# Patient Record
Sex: FEMALE | Race: WHITE | NOT HISPANIC OR LATINO | Employment: PART TIME | URBAN - METROPOLITAN AREA
[De-identification: names, ages, dates, MRNs, and addresses within clinical notes are randomized per-mention and may not be internally consistent; named-entity substitution may affect disease eponyms.]

---

## 2017-03-06 DIAGNOSIS — E78.5 HYPERLIPIDEMIA LDL GOAL <100: ICD-10-CM

## 2017-03-06 NOTE — TELEPHONE ENCOUNTER
simvastatin     Last Written Prescription Date: 5/10/16  Last Fill Quantity: 90, # refills: 3  Last Office Visit with [facility], [facility] or [facility] prescribing provider: 8/3/16       Lab Results   Component Value Date    CHOL 180 05/31/2016     Lab Results   Component Value Date    HDL 59 05/31/2016     Lab Results   Component Value Date    LDL 83 05/31/2016     Lab Results   Component Value Date    TRIG 191 05/31/2016     Lab Results   Component Value Date    CHOLHDLRATIO 2.9 05/20/2015       Labs showing if normal/abnormal  Lab Results   Component Value Date    CHOL 180 05/31/2016    TRIG 191 05/31/2016    HDL 59 05/31/2016    LDL 83 05/31/2016    VLDL 25 05/20/2015    CHOLHDLRATIO 2.9 05/20/2015

## 2017-03-07 ENCOUNTER — TRANSFERRED RECORDS (OUTPATIENT)
Dept: HEALTH INFORMATION MANAGEMENT | Facility: CLINIC | Age: 47
End: 2017-03-07

## 2017-03-07 LAB
ALT SERPL-CCNC: NORMAL U/L
AST SERPL-CCNC: 21 U/L (ref 5–34)
CREAT SERPL-MCNC: 0.76 MG/DL (ref 0.5–1.3)
GFR SERPL CREATININE-BSD FRML MDRD: 87.1 ML/MIN/1.73M2
HEP C HIM: NORMAL

## 2017-03-09 RX ORDER — SIMVASTATIN 40 MG
40 TABLET ORAL AT BEDTIME
Qty: 90 TABLET | Refills: 1 | Status: SHIPPED | OUTPATIENT
Start: 2017-03-09 | End: 2017-10-04

## 2017-03-29 ENCOUNTER — TRANSFERRED RECORDS (OUTPATIENT)
Dept: HEALTH INFORMATION MANAGEMENT | Facility: CLINIC | Age: 47
End: 2017-03-29

## 2017-03-29 LAB
HBA1C MFR BLD: 8.3 % (ref 4–6)
TSH SERPL-ACNC: 4.31 UIU/ML (ref 0.3–5)

## 2017-06-20 ENCOUNTER — TRANSFERRED RECORDS (OUTPATIENT)
Dept: HEALTH INFORMATION MANAGEMENT | Facility: CLINIC | Age: 47
End: 2017-06-20

## 2017-06-20 LAB
ALT SERPL-CCNC: 21 IU/L (ref 5–35)
AST SERPL-CCNC: 24 U/L (ref 5–34)
CREAT SERPL-MCNC: 0.96 MG/DL (ref 0.5–1.3)
GFR SERPL CREATININE-BSD FRML MDRD: 66.5 ML/MIN/1.73M2

## 2017-07-06 ENCOUNTER — TRANSFERRED RECORDS (OUTPATIENT)
Dept: HEALTH INFORMATION MANAGEMENT | Facility: CLINIC | Age: 47
End: 2017-07-06

## 2017-08-07 ENCOUNTER — TRANSFERRED RECORDS (OUTPATIENT)
Dept: HEALTH INFORMATION MANAGEMENT | Facility: CLINIC | Age: 47
End: 2017-08-07

## 2017-08-07 LAB
ALT SERPL-CCNC: 15 U/L (ref 6–29)
CHOLEST SERPL-MCNC: 149 MG/DL (ref 125–200)
CREAT SERPL-MCNC: 0.89 MG/DL (ref 0.5–1.1)
GFR SERPL CREATININE-BSD FRML MDRD: 78 ML/MIN/1.73M2
GLUCOSE SERPL-MCNC: 130 MG/DL (ref 65–99)
HBA1C MFR BLD: 8 % (ref 4–6)
HDLC SERPL-MCNC: 57 MG/DL
LDLC SERPL CALC-MCNC: 68 MG/DL
NONHDLC SERPL-MCNC: 92 MG/DL
POTASSIUM SERPL-SCNC: 4.5 MMOL/L (ref 3.5–5.3)
TRIGL SERPL-MCNC: 120 MG/DL

## 2017-08-13 ENCOUNTER — TRANSFERRED RECORDS (OUTPATIENT)
Dept: HEALTH INFORMATION MANAGEMENT | Facility: CLINIC | Age: 47
End: 2017-08-13

## 2017-09-20 ENCOUNTER — TRANSFERRED RECORDS (OUTPATIENT)
Dept: HEALTH INFORMATION MANAGEMENT | Facility: CLINIC | Age: 47
End: 2017-09-20

## 2017-09-20 LAB
ALT SERPL-CCNC: 36 IU/L (ref 5–35)
AST SERPL-CCNC: 30 U/L (ref 5–34)
CREAT SERPL-MCNC: 0.7 MG/DL (ref 0.5–1.3)
GFR SERPL CREATININE-BSD FRML MDRD: 95.7 ML/MIN/1.73M2

## 2017-10-04 DIAGNOSIS — E78.5 HYPERLIPIDEMIA LDL GOAL <100: ICD-10-CM

## 2017-10-04 NOTE — TELEPHONE ENCOUNTER
SIMVASTATIN     Last Written Prescription Date: 03/09/17  Last Fill Quantity: 90, # refills: 1  Last Office Visit with [facility] or [facility] prescribing provider: 08/03/16       Lab Results   Component Value Date    CHOL 149 08/07/2017     Lab Results   Component Value Date    HDL 57 08/07/2017     Lab Results   Component Value Date    LDL 68 08/07/2017     Lab Results   Component Value Date    TRIG 120 08/07/2017     Lab Results   Component Value Date    CHOLHDLRATIO 2.9 05/20/2015       Labs showing if normal/abnormal  Lab Results   Component Value Date    CHOL 149 08/07/2017    TRIG 120 08/07/2017    HDL 57 08/07/2017    LDL 68 08/07/2017    VLDL 25 05/20/2015    CHOLHDLRATIO 2.9 05/20/2015

## 2017-10-06 RX ORDER — SIMVASTATIN 40 MG
TABLET ORAL
Qty: 14 TABLET | Refills: 0 | Status: SHIPPED | OUTPATIENT
Start: 2017-10-06 | End: 2017-10-10

## 2017-10-06 NOTE — TELEPHONE ENCOUNTER
Pt last seen more than a yr ago , pl advise appt. I have reviewed her labs, needs to be seen once a yr for chronic meds.

## 2017-10-10 ENCOUNTER — OFFICE VISIT (OUTPATIENT)
Dept: INTERNAL MEDICINE | Facility: CLINIC | Age: 47
End: 2017-10-10
Payer: COMMERCIAL

## 2017-10-10 VITALS
DIASTOLIC BLOOD PRESSURE: 70 MMHG | TEMPERATURE: 97.4 F | WEIGHT: 125.8 LBS | OXYGEN SATURATION: 98 % | BODY MASS INDEX: 21.93 KG/M2 | HEART RATE: 101 BPM | SYSTOLIC BLOOD PRESSURE: 104 MMHG

## 2017-10-10 DIAGNOSIS — E78.5 HYPERLIPIDEMIA LDL GOAL <100: ICD-10-CM

## 2017-10-10 DIAGNOSIS — Z23 NEED FOR PROPHYLACTIC VACCINATION AND INOCULATION AGAINST INFLUENZA: ICD-10-CM

## 2017-10-10 DIAGNOSIS — Z23 NEED FOR PNEUMOCOCCAL VACCINATION: ICD-10-CM

## 2017-10-10 DIAGNOSIS — Z00.00 ENCOUNTER FOR ROUTINE ADULT HEALTH EXAMINATION WITHOUT ABNORMAL FINDINGS: Primary | ICD-10-CM

## 2017-10-10 PROCEDURE — 99396 PREV VISIT EST AGE 40-64: CPT | Mod: 25 | Performed by: INTERNAL MEDICINE

## 2017-10-10 PROCEDURE — 90662 IIV NO PRSV INCREASED AG IM: CPT | Performed by: INTERNAL MEDICINE

## 2017-10-10 PROCEDURE — 90670 PCV13 VACCINE IM: CPT | Performed by: INTERNAL MEDICINE

## 2017-10-10 PROCEDURE — 90472 IMMUNIZATION ADMIN EACH ADD: CPT | Performed by: INTERNAL MEDICINE

## 2017-10-10 PROCEDURE — 90471 IMMUNIZATION ADMIN: CPT | Performed by: INTERNAL MEDICINE

## 2017-10-10 RX ORDER — INSULIN GLARGINE 100 [IU]/ML
22-24 INJECTION, SOLUTION SUBCUTANEOUS EVERY MORNING
COMMUNITY
End: 2018-05-31

## 2017-10-10 RX ORDER — SIMVASTATIN 40 MG
TABLET ORAL
Qty: 90 TABLET | Refills: 3 | Status: SHIPPED | OUTPATIENT
Start: 2017-10-10 | End: 2018-10-21

## 2017-10-10 NOTE — NURSING NOTE
"Chief Complaint   Patient presents with     Physical     Not fasting       Initial /70  Pulse 101  Temp 97.4  F (36.3  C) (Oral)  Wt 125 lb 12.8 oz (57.1 kg)  SpO2 98%  BMI 21.93 kg/m2 Estimated body mass index is 21.93 kg/(m^2) as calculated from the following:    Height as of 8/3/16: 5' 3.5\" (1.613 m).    Weight as of this encounter: 125 lb 12.8 oz (57.1 kg).  Medication Reconciliation: complete     Sadia Varma CMA      "

## 2017-10-10 NOTE — PROGRESS NOTES
Injectable Influenza Immunization Documentation    1.  Is the person to be vaccinated sick today?   No    2. Does the person to be vaccinated have an allergy to a component   of the vaccine?   No    3. Has the person to be vaccinated ever had a serious reaction   to influenza vaccine in the past?   No    4. Has the person to be vaccinated ever had Guillain-Barré syndrome?   No    Form completed by FERNANDO Cunha LPN

## 2017-10-10 NOTE — PROGRESS NOTES
SUBJECTIVE:   CC: Perlita Chavez is an 46 year old woman who presents for preventive health visit.     Healthy Habits:    Do you get at least three servings of calcium containing foods daily (dairy, green leafy vegetables, etc.)? yes    Amount of exercise or daily activities, outside of work: 3 day(s) per week    Problems taking medications regularly No    Medication side effects: Weight Gain    Have you had an eye exam in the past two years? yes    Do you see a dentist twice per year? yes    Do you have sleep apnea, excessive snoring or daytime drowsiness?no      Pt sees endocrinologist  for diabetes and sees Rheumatologist for recent diagnosis of Rh arthritis.       Today's PHQ-2 Score: PHQ-2 ( 1999 Pfizer) 10/10/2017 5/10/2016   Q1: Little interest or pleasure in doing things 0 0   Q2: Feeling down, depressed or hopeless 0 0   PHQ-2 Score 0 0         Abuse: Current or Past(Physical, Sexual or Emotional)- No  Do you feel safe in your environment - Yes    Past Medical History:   Diagnosis Date     Background diabetic retinopathy(362.01)     sees retina doc q3 mo. Ophtalmo=Dr Solomon Davison ( [phone]) at [facility], Retina Specialist = Dr James Souza ( [phone])     Hyperlipidemia LDL goal < 130      NONSPECIFIC MEDICAL HISTORY     photosensitivity reaction on lisinopril 3/08     RA (rheumatoid arthritis) (H)     seeing rheumatologist.     Sjoegren syndrome (H)     seeing rheumatologist.     Type 1 diabetes, HbA1c goal < 8% (H) age 3    + retinopathy,seeing endocrinologist   since 08/14       Past Surgical History:   Procedure Laterality Date     C NONSPECIFIC PROCEDURE  8/00/97    T & A     C NONSPECIFIC PROCEDURE  5/00/99    L eye surgery     C NONSPECIFIC PROCEDURE  4/10/01    R cataract + IOL     C NONSPECIFIC PROCEDURE  2002    bilat vitrectomy     C NONSPECIFIC PROCEDURE  7/04    R eye vitrectomy     C NONSPECIFIC PROCEDURE  5/08    R vitrectomy & partial retinal detachment         Current Outpatient Prescriptions   Medication Sig Dispense Refill     BASAGLAR 100 UNIT/ML injection Inject 22-24 Units Subcutaneous every morning       simvastatin (ZOCOR) 40 MG tablet TAKE 1 TABLET BY MOUTH AT BEDTIME TO LOWER CHOLESTEROL. 14 tablet 0     InFLIXimab (REMICADE IV) Inject 300 mg into the vein Every 2 months       cycloSPORINE (RESTASIS) 0.05 % ophthalmic emulsion Place 1 drop into both eyes 2 times daily       Methotrexate Sodium (METHOTREXATE PO) Take by mouth once a week 7 tablets once a week       sulfaSALAzine (AZULFIDINE) 500 MG tablet Take 500 mg by mouth 2 times daily 2 tablets twice daily       DICLOFENAC PO Take by mouth 2 times daily       FOLIC ACID PO Take 1 mg by mouth daily       blood glucose (EDWIN CONTOUR) test strip 1 strip by In Vitro route 4 times daily (before meals and nightly) Checks 4-5 x daily 400 strip 3     NOVOLOG FLEXPEN SOLN 100 UNIT/ML Inject  Subcutaneous 3 times daily (before meals). 6  units in AM and  6-8 units for lunch / dinner.   Max dose of 30 units per day. 3 Month 1     Omega-3 Fatty Acids (FISH OIL TRIPLE STRENGTH) 1400 MG CAPS Take 2 tablets by mouth daily.       ValACYclovir (VALTREX) 500 MG tablet Take 1 tablet by mouth daily. 14 tablet prn     Insulin Pen Needle (B-D U/F PEN NEEDLE) needle 3 Box See Admin Instructions. Use 5 x daily or as directed. 1 Box prn     TIMOLOL MALEATE OP Apply  to eye daily. 1 drop in each eye.        ACE/ARB NOT PRESCRIBED, INTENTIONAL, 1 each by Other route continuous prn.       MALKA 28 3-0.03 MG OR TABS 1 TABLET DAILY 3 months prn     ASPIRIN 81 MG OR TABS ONE DAILY 100 3     ONE DAILY MULTIPLE VITAMIN OR TABS one daily  0     prednisoLONE acetate (PRED FORTE) 1 % ophthalmic suspension Place 1 drop into both eyes daily         Family History   Problem Relation Age of Onset     Arthritis Mother      on celebrex,alive & healthy 2002     Arthritis Father      on celebrex(2002)     HEART DISEASE Father      heart attack  1990, 60 years old (2002)     DIABETES Father      CEREBROVASCULAR DISEASE Father      age 70     Thyroid Disease Sister      on thyroid med for couple of years , 35 yrs now(2002)     CANCER Paternal Grandmother      STOMACH     CEREBROVASCULAR DISEASE Paternal Grandfather      age 90         Social History   Substance Use Topics     Smoking status: Never Smoker     Smokeless tobacco: Never Used     Alcohol use Yes      Comment: 1 drink every 3 weeks     The patient does not drink >3 drinks per day nor >7 drinks per week.    Reviewed orders with patient.  Reviewed health maintenance and updated orders accordingly - Yes    Pertinent mammograms are reviewed under the imaging tab.  History of abnormal Pap smear: NO - age 30- 65 PAP every 3 years recommended    Reviewed and updated as needed this visit by clinical staff  Tobacco  Allergies  Meds  Med Hx  Surg Hx  Fam Hx  Soc Hx        Reviewed and updated as needed this visit by Provider          ROS:  C: NEGATIVE for fever, chills, change in weight  I: NEGATIVE for worrisome rashes, moles or lesions  E: NEGATIVE for vision changes or irritation  ENT: NEGATIVE for ear, mouth and throat problems  R: NEGATIVE for significant cough or SOB  B: NEGATIVE for masses, tenderness or discharge  CV: NEGATIVE for chest pain, palpitations or peripheral edema  GI: NEGATIVE for nausea, abdominal pain, heartburn, or change in bowel habits  : NEGATIVE for unusual urinary or vaginal symptoms. Periods are regular.  M: NEGATIVE for significant arthralgias or myalgia  N: NEGATIVE for weakness, dizziness or paresthesias  P: NEGATIVE for changes in mood or affect    OBJECTIVE:   /70  Pulse 101  Temp 97.4  F (36.3  C) (Oral)  Wt 125 lb 12.8 oz (57.1 kg)  SpO2 98%  BMI 21.93 kg/m2  EXAM:  GENERAL APPEARANCE: healthy, alert and no distress  EYES: Eyes grossly normal to inspection, PERRL and conjunctivae and sclerae normal  HENT: ear canals and TM's normal, nose and mouth  "without ulcers or lesions, oropharynx clear and oral mucous membranes moist  NECK: no adenopathy, no asymmetry, masses, or scars and thyroid normal to palpation  RESP: lungs clear to auscultation - no rales, rhonchi or wheezes  BREAST: normal without masses, tenderness or nipple discharge and no palpable axillary masses or adenopathy  CV: regular rate and rhythm, normal S1 S2, no S3 or S4, no murmur, click or rub, no peripheral edema and peripheral pulses strong  ABDOMEN: soft, nontender, no hepatosplenomegaly, no masses and bowel sounds normal  MS: no musculoskeletal defects are noted and gait is age appropriate without ataxia  NEURO: Normal strength and tone, sensory exam grossly normal, mentation intact and speech normal  PSYCH: mentation appears normal and affect normal/bright    ASSESSMENT/PLAN:     (Z00.00) Encounter for routine adult health examination without abnormal findings  (primary encounter diagnosis)  Plan: has had recent BMP,Liver tests, Lipid panel and A1c. ,not due for pap.      (E78.5) Hyperlipidemia LDL goal <100  Comment: recent lipids reviewed, normal  Plan: simvastatin (ZOCOR) 40 MG tablet refilled.explained clearly about the medication,insructions and side effects.            (Z23) Need for prophylactic vaccination and inoculation against influenza  Plan: HC FLU VACCINE, INCREASED ANTIGEN, PRESV FREE,          ADMIN Vaccine, Initial  (51679),         (Z23) Need for pneumococcal vaccination  Plan: Pneumococcal vaccine 13 valent PCV13 IM         (Prevnar) [31806]            COUNSELING:   Reviewed preventive health counseling, as reflected in patient instructions       Regular exercise       Healthy diet/nutrition       Immunizations    Vaccinated for: Influenza and Pneumococcal               reports that she has never smoked. She has never used smokeless tobacco.    Estimated body mass index is 21.93 kg/(m^2) as calculated from the following:    Height as of 8/3/16: 5' 3.5\" (1.613 m).    Weight " as of this encounter: 125 lb 12.8 oz (57.1 kg).         Counseling Resources:  ATP IV Guidelines  Pooled Cohorts Equation Calculator  Breast Cancer Risk Calculator  FRAX Risk Assessment  ICSI Preventive Guidelines  Dietary Guidelines for Americans, 2010  USDA's MyPlate  ASA Prophylaxis  Lung CA Screening    Calos Melendez MD  [facility]

## 2017-10-10 NOTE — MR AVS SNAPSHOT
After Visit Summary   10/10/2017    Perlita Chavez    MRN: [id number]           Patient Information     Date Of Birth          1970        Visit Information        Provider Department      10/10/2017 3:00 PM Calos Melendez MD [facility]        Today's Diagnoses     Encounter for routine adult health examination without abnormal findings    -  1    Hyperlipidemia LDL goal <100        Need for prophylactic vaccination and inoculation against influenza        Need for pneumococcal vaccination          Care Instructions      Preventive Health Recommendations  Female Ages 40 to 49    Yearly exam:     See your health care provider every year in order to  1. Review health changes.   2. Discuss preventive care.    3. Review your medicines if your doctor prescribed any.      Get a Pap test every three years (unless you have an abnormal result and your provider advises testing more often).      If you get Pap tests with HPV test, you only need to test every 5 years, unless you have an abnormal result. You do not need a Pap test if your uterus was removed (hysterectomy) and you have not had cancer.      You should be tested each year for STDs (sexually transmitted diseases), if you're at risk.       Ask your doctor if you should have a mammogram.      Have a colonoscopy (test for colon cancer) if someone in your family has had colon cancer or polyps before age 50.       Have a cholesterol test every 5 years.       Have a diabetes test (fasting glucose) after age 45. If you are at risk for diabetes, you should have this test every 3 years.    Shots: Get a flu shot each year. Get a tetanus shot every 10 years.     Nutrition:     Eat at least 5 servings of fruits and vegetables each day.    Eat whole-grain bread, whole-wheat pasta and brown rice instead of white grains and rice.    Talk to your provider about Calcium and Vitamin D.     Lifestyle    Exercise at least 150 minutes a  "week (an average of 30 minutes a day, 5 days a week). This will help you control your weight and prevent disease.    Limit alcohol to one drink per day.    No smoking.     Wear sunscreen to prevent skin cancer.    See your dentist every six months for an exam and cleaning.          Follow-ups after your visit        Who to contact     If you have questions or need follow up information about today's clinic visit or your schedule please contact [facility] directly at [phone].  Normal or non-critical lab and imaging results will be communicated to you by MyChart, letter or phone within 4 business days after the clinic has received the results. If you do not hear from us within 7 days, please contact the clinic through [facility] or phone. If you have a critical or abnormal lab result, we will notify you by phone as soon as possible.  Submit refill requests through [employer] or call your pharmacy and they will forward the refill request to us. Please allow 3 business days for your refill to be completed.          Additional Information About Your Visit        [facility] Information     [employer] lets you send messages to your doctor, view your test results, renew your prescriptions, schedule appointments and more. To sign up, go to [email or web address] . Click on \"Log in\" on the left side of the screen, which will take you to the Welcome page. Then click on \"Sign up Now\" on the right side of the page.     You will be asked to enter the access code listed below, as well as some personal information. Please follow the directions to create your username and password.     Your access code is: [email or web address]  Expires: 2018  8:20 AM     Your access code will  in 90 days. If you need help or a new code, please call your [facility] or [phone].        Care EveryWhere ID     This is your Care EveryWhere ID. This could be used by other organizations to access your [place] medical " records  [id number]        Your Vitals Were     Pulse Temperature Pulse Oximetry BMI (Body Mass Index)          101 97.4  F (36.3  C) (Oral) 98% 21.93 kg/m2         Blood Pressure from Last 3 Encounters:   10/10/17 104/70   08/03/16 136/78   05/10/16 120/70    Weight from Last 3 Encounters:   10/10/17 125 lb 12.8 oz (57.1 kg)   08/03/16 122 lb (55.3 kg)   05/10/16 118 lb (53.5 kg)              We Performed the Following     ADMIN Vaccine, Initial (47348)     HC FLU VACCINE, INCREASED ANTIGEN, PRESV FREE     Pneumococcal vaccine 13 valent PCV13 IM (Prevnar) [49082]          Today's Medication Changes          These changes are accurate as of: 10/10/17 11:59 PM.  If you have any questions, ask your nurse or doctor.               These medicines have changed or have updated prescriptions.        Dose/Directions    BASAGLAR 100 UNIT/ML injection   This may have changed:  Another medication with the same name was removed. Continue taking this medication, and follow the directions you see here.   Changed by:  Calos Melendez MD        Dose:  22-24 Units   Inject 22-24 Units Subcutaneous every morning   Refills:  0       simvastatin 40 MG tablet   Commonly known as:  ZOCOR   This may have changed:  See the new instructions.   Used for:  Hyperlipidemia LDL goal <100   Changed by:  Calos Melendez MD        TAKE 1 TABLET BY MOUTH AT BEDTIME TO LOWER CHOLESTEROL.   Quantity:  90 tablet   Refills:  3         Stop taking these medicines if you haven't already. Please contact your care team if you have questions.     cephALEXin 500 MG capsule   Commonly known as:  KEFLEX   Stopped by:  Calos Melendez MD                Where to get your medicines      These medications were sent to [facility] [id number]  [place], [place] - [address] AT [facility] [address]  [address] [place] [address] [address]    Hours:  24-hours Phone:  [phone]     simvastatin 40 MG tablet                 Primary Care Provider Office Phone # Fax #    Charbelnakumari JUSTINE MD Nora [phone]       [address] [facility] [address]        Equal Access to Services     SMITA ARAYA : Hadii aad ku haddeepikao Soomaali, waaxda luqadaha, qaybta kaalmada adeegyada, peewee barcenasarnulfo dunn. So [facility] [phone].    ATENCIÓN: Si habla español, tiene a perkins disposición servicios gratuitos de asistencia lingüística. Llame al [phone].    We comply with applicable federal civil rights laws and [place] laws. We do not discriminate on the basis of race, color, national origin, age, disability, sex, sexual orientation, or gender identity.            Thank you!     Thank you for choosing [facility]  for your care. Our goal is always to provide you with excellent care. Hearing back from our patients is one way we can continue to improve our services. Please take a few minutes to complete the written survey that you may receive in the mail after your visit with us. Thank you!             Your Updated Medication List - Protect others around you: Learn how to safely use, store and throw away your medicines at www.disposemymeds.org.          This list is accurate as of: 10/10/17 11:59 PM.  Always use your most recent med list.                   Brand Name Dispense Instructions for use Diagnosis    ACE/ARB NOT PRESCRIBED (INTENTIONAL)      1 each by Other route continuous prn.    Type 1 diabetes, HbA1c goal < 8% (H), Hyperlipidemia LDL goal < 130       aspirin 81 MG tablet     100    ONE DAILY        BASAGLAR 100 UNIT/ML injection      Inject 22-24 Units Subcutaneous every morning        blood glucose monitoring test strip    EDWIN CONTOUR    400 strip    1 strip by In Vitro route 4 times daily (before meals and nightly) Checks 4-5 x daily    Type 1 diabetes, HbA1c goal < 8% (H)       cycloSPORINE 0.05 % ophthalmic emulsion    RESTASIS     Place 1 drop into both eyes 2  times daily        DICLOFENAC PO      Take by mouth 2 times daily        FISH OIL TRIPLE STRENGTH 1400 MG Caps      Take 2 tablets by mouth daily.        FOLIC ACID PO      Take 1 mg by mouth daily        insulin pen needle 31G X 5 MM    B-D U/F    1 Box    3 Box See Admin Instructions. Use 5 x daily or as directed.    Type 1 diabetes, HbA1c goal < 8% (H)       METHOTREXATE PO      Take by mouth once a week 7 tablets once a week        NovoLOG FLEXpen 100 UNITS/ML injection   Generic drug:  insulin aspart     3 Month    Inject  Subcutaneous 3 times daily (before meals). 6  units in AM and  6-8 units for lunch / dinner.   Max dose of 30 units per day.    Type 1 diabetes, HbA1c goal < 8% (H)       ONE DAILY MULTIPLE VITAMIN Tabs      one daily        prednisoLONE acetate 1 % ophthalmic susp    PRED FORTE     Place 1 drop into both eyes daily        REMICADE IV      Inject 300 mg into the vein Every 2 months        simvastatin 40 MG tablet    ZOCOR    90 tablet    TAKE 1 TABLET BY MOUTH AT BEDTIME TO LOWER CHOLESTEROL.    Hyperlipidemia LDL goal <100       sulfaSALAzine 500 MG tablet    AZULFIDINE     Take 500 mg by mouth 2 times daily 2 tablets twice daily        TIMOLOL MALEATE OP      Apply  to eye daily. 1 drop in each eye.        VALTREX 500 MG tablet   Generic drug:  valACYclovir     14 tablet    Take 1 tablet by mouth daily.    Mixed hyperlipidemia, Routine physical examination       MALKA 28 3-0.03 MG per tablet   Generic drug:  drospirenone-ethinyl estradiol     3 months     1 TABLET DAILY    Type I (juvenile type) diabetes mellitus without mention of complication, not stated as uncontrolled, Mixed hyperlipidemia

## 2017-10-13 ENCOUNTER — TRANSFERRED RECORDS (OUTPATIENT)
Dept: HEALTH INFORMATION MANAGEMENT | Facility: CLINIC | Age: 47
End: 2017-10-13

## 2017-10-18 ENCOUNTER — TRANSFERRED RECORDS (OUTPATIENT)
Dept: HEALTH INFORMATION MANAGEMENT | Facility: CLINIC | Age: 47
End: 2017-10-18

## 2017-11-07 ENCOUNTER — TRANSFERRED RECORDS (OUTPATIENT)
Dept: HEALTH INFORMATION MANAGEMENT | Facility: CLINIC | Age: 47
End: 2017-11-07

## 2017-11-07 LAB
ALT SERPL-CCNC: 18 IU/L (ref 5–35)
AST SERPL-CCNC: 19 U/L (ref 5–34)

## 2017-11-08 ENCOUNTER — TRANSFERRED RECORDS (OUTPATIENT)
Dept: HEALTH INFORMATION MANAGEMENT | Facility: CLINIC | Age: 47
End: 2017-11-08

## 2017-11-08 LAB
ALT SERPL-CCNC: 15 U/L (ref 6–29)
CREAT SERPL-MCNC: 0.83 MG/DL (ref 0.5–1.1)
GFR SERPL CREATININE-BSD FRML MDRD: 85 ML/MIN/1.73M2
GLUCOSE SERPL-MCNC: 370 MG/DL (ref 65–99)
HBA1C MFR BLD: 8.2 % (ref 4–6)
POTASSIUM SERPL-SCNC: 3.7 MMOL/L (ref 3.5–5.3)

## 2017-11-12 ENCOUNTER — TRANSFERRED RECORDS (OUTPATIENT)
Dept: HEALTH INFORMATION MANAGEMENT | Facility: CLINIC | Age: 47
End: 2017-11-12

## 2018-01-24 ENCOUNTER — TRANSFERRED RECORDS (OUTPATIENT)
Dept: HEALTH INFORMATION MANAGEMENT | Facility: CLINIC | Age: 48
End: 2018-01-24

## 2018-01-24 LAB
ALT SERPL-CCNC: 16 IU/L (ref 5–35)
AST SERPL-CCNC: 20 U/L (ref 5–34)
CREAT SERPL-MCNC: 0.84 MG/DL (ref 0.5–1.3)
GFR SERPL CREATININE-BSD FRML MDRD: 77.2 ML/MIN/1.73M2

## 2018-02-02 ENCOUNTER — TRANSFERRED RECORDS (OUTPATIENT)
Dept: HEALTH INFORMATION MANAGEMENT | Facility: CLINIC | Age: 48
End: 2018-02-02

## 2018-03-16 ENCOUNTER — OFFICE VISIT (OUTPATIENT)
Dept: ENDOCRINOLOGY | Facility: CLINIC | Age: 48
End: 2018-03-16
Payer: COMMERCIAL

## 2018-03-16 VITALS
BODY MASS INDEX: 21.51 KG/M2 | HEART RATE: 85 BPM | DIASTOLIC BLOOD PRESSURE: 81 MMHG | HEIGHT: 64 IN | WEIGHT: 126 LBS | SYSTOLIC BLOOD PRESSURE: 136 MMHG

## 2018-03-16 DIAGNOSIS — E10.3599 TYPE 1 DIABETES MELLITUS WITH PROLIFERATIVE RETINOPATHY, UNSPECIFIED LATERALITY, UNSPECIFIED PROLIFERATIVE RETINOPATHY TYPE: Primary | ICD-10-CM

## 2018-03-16 LAB — HBA1C MFR BLD: 8.4 % (ref 4.3–6)

## 2018-03-16 PROCEDURE — 36415 COLL VENOUS BLD VENIPUNCTURE: CPT | Performed by: INTERNAL MEDICINE

## 2018-03-16 PROCEDURE — 99214 OFFICE O/P EST MOD 30 MIN: CPT | Performed by: INTERNAL MEDICINE

## 2018-03-16 PROCEDURE — 80048 BASIC METABOLIC PNL TOTAL CA: CPT | Performed by: INTERNAL MEDICINE

## 2018-03-16 PROCEDURE — 84460 ALANINE AMINO (ALT) (SGPT): CPT | Performed by: INTERNAL MEDICINE

## 2018-03-16 PROCEDURE — 82043 UR ALBUMIN QUANTITATIVE: CPT | Performed by: INTERNAL MEDICINE

## 2018-03-16 PROCEDURE — 84443 ASSAY THYROID STIM HORMONE: CPT | Performed by: INTERNAL MEDICINE

## 2018-03-16 PROCEDURE — 83036 HEMOGLOBIN GLYCOSYLATED A1C: CPT | Performed by: INTERNAL MEDICINE

## 2018-03-16 RX ORDER — DICLOFENAC SODIUM 75 MG/1
TABLET, DELAYED RELEASE ORAL
Refills: 2 | COMMUNITY
Start: 2018-03-03

## 2018-03-16 RX ORDER — TIMOLOL MALEATE 5 MG/ML
SOLUTION/ DROPS OPHTHALMIC
Refills: 6 | COMMUNITY
Start: 2017-10-30 | End: 2019-09-17

## 2018-03-16 RX ORDER — PREDNISONE 5 MG/1
2.5 TABLET ORAL
Refills: 0 | COMMUNITY
Start: 2018-03-08

## 2018-03-16 NOTE — NURSING NOTE
"Chief Complaint   Patient presents with     Diabetes       Initial /81 (BP Location: Right arm, Cuff Size: Adult Regular)  Pulse 85  Ht 5' 3.5\" (1.613 m)  Wt 126 lb (57.2 kg)  BMI 21.97 kg/m2 Estimated body mass index is 21.97 kg/(m^2) as calculated from the following:    Height as of this encounter: 5' 3.5\" (1.613 m).    Weight as of this encounter: 126 lb (57.2 kg).  Medication Reconciliation: complete         Awilda Quintero      "

## 2018-03-16 NOTE — PATIENT INSTRUCTIONS
Continue current insulin management plan  Use the bolus wizard advisor with the Accucheck Guide meter and iPhone program  Will switch from Novolog to Fiasp with same dosing, new eRx will be sent to pharmacy   Activate the Fiasp discount cared   Also needs update on the Basaglar pen insulin and Accucheck Guide TS's, pen needles   Uses  pharmacy on [address]  Sign up for [facility]

## 2018-03-16 NOTE — MR AVS SNAPSHOT
"              After Visit Summary   3/16/2018    Perlita Chavez    MRN: [id number]           Patient Information     Date Of Birth          1970        Visit Information        Provider Department      3/16/2018 3:30 PM Saurabh Lowe MD [facility]        Today's Diagnoses     Type 1 diabetes mellitus with proliferative retinopathy, unspecified laterality, unspecified proliferative retinopathy type (H)    -  1      Care Instructions    Continue current insulin management plan  Use the bolus wizard advisor with the Accucheck Guide meter and iPhone program  Will switch from Novolog to Fiasp with same dosing, new eRx will be sent to pharmacy   Activate the Fiasp discount cared   Also needs update on the Basaglar pen insulin and Accucheck Guide TS's, pen needles   Uses  pharmacy on [address]  Sign [facility] for [facility]          Follow-ups after your visit        Who to contact     If you have questions or need follow up information about today's clinic visit or your schedule please contact [facility] directly at [phone].  Normal or non-critical lab and imaging results will be communicated to you by MyChart, letter or phone within 4 business days after the clinic has received the results. If you do not hear from us within 7 days, please contact the clinic through [facility] [employer] or phone. If you have a critical or abnormal lab result, we will notify you by phone as soon as possible.  Submit refill requests through [employer] or call your pharmacy and they will forward the refill request to us. Please allow 3 business days for your refill to be completed.          Additional Information About Your Visit        MyChart Information     [employer] lets you send messages to your doctor, view your test results, renew your prescriptions, schedule appointments and more. To sign up, go to [email or web address] . Click on \"Log in\" on the left side of the screen, which will take " "you to the Welcome page. Then click on \"Sign up Now\" on the right side of the page.     You will be asked to enter the access code listed below, as well as some personal information. Please follow the directions to create your username and password.     Your access code is: [email or web address]  Expires: 2018  4:18 PM     Your access code will  in 90 days. If you need help or a new code, please call your [place] clinic or [phone].        Care EveryWhere ID     This is your Care EveryWhere ID. This could be used by other organizations to access your [place] medical records  [id number]        Your Vitals Were     Pulse Height BMI (Body Mass Index)             85 5' 3.5\" (1.613 m) 21.97 kg/m2          Blood Pressure from Last 3 Encounters:   18 136/81   10/10/17 104/70   16 136/78    Weight from Last 3 Encounters:   18 126 lb (57.2 kg)   10/10/17 125 lb 12.8 oz (57.1 kg)   16 122 lb (55.3 kg)              We Performed the Following     Albumin Random Urine Quantitative with Creat Ratio     ALT     Basic metabolic panel     Hemoglobin A1c     TSH        Primary Care Provider Office Phone # Fax #    Gabrielai JUSTINE Melendez [phone]       [address] [facility] [address]        Equal Access to Services     [facility]: Hadii aad ku hadasho Soomaali, waaxda luqadaha, qaybta kaalmada adeegyada, peewee garner . So [facility] [phone].    ATENCIÓN: Si habla español, tiene a perkins disposición servicios gratuitos de asistencia lingüística. Alina al [phone].    We comply with applicable federal civil rights laws and [place] laws. We do not discriminate on the basis of race, color, national origin, age, disability, sex, sexual orientation, or gender identity.            Thank you!     Thank you for choosing [facility]  for your care. Our goal is always to provide you with excellent care. Hearing back from our patients is " one way we can continue to improve our services. Please take a few minutes to complete the written survey that you may receive in the mail after your visit with us. Thank you!             Your Updated Medication List - Protect others around you: Learn how to safely use, store and throw away your medicines at www.disposemymeds.org.          This list is accurate as of 3/16/18  4:18 PM.  Always use your most recent med list.                   Brand Name Dispense Instructions for use Diagnosis    ACE/ARB/ARNI NOT PRESCRIBED (INTENTIONAL)      1 each by Other route continuous prn.    Type 1 diabetes, HbA1c goal < 8% (H), Hyperlipidemia LDL goal < 130       aspirin 81 MG tablet     100    ONE DAILY        BASAGLAR 100 UNIT/ML injection      Inject 22-24 Units Subcutaneous every morning        blood glucose monitoring test strip    EDWIN CONTOUR    400 strip    1 strip by In Vitro route 4 times daily (before meals and nightly) Checks 4-5 x daily    Type 1 diabetes, HbA1c goal < 8% (H)       cycloSPORINE 0.05 % ophthalmic emulsion    RESTASIS     Place 1 drop into both eyes 2 times daily        diclofenac 75 MG EC tablet    VOLTAREN     TK 1 T PO BID        DICLOFENAC PO      Take by mouth 2 times daily        FISH OIL TRIPLE STRENGTH 1400 MG Caps      Take 2 tablets by mouth daily.        FOLIC ACID PO      Take 1 mg by mouth daily        insulin pen needle 31G X 5 MM    B-D U/F    1 Box    3 Box See Admin Instructions. Use 5 x daily or as directed.    Type 1 diabetes, HbA1c goal < 8% (H)       METHOTREXATE PO      Take by mouth once a week 7 tablets once a week        NovoLOG FLEXpen 100 UNITS/ML injection   Generic drug:  insulin aspart     3 Month    Inject  Subcutaneous 3 times daily (before meals). 6  units in AM and  6-8 units for lunch / dinner.   Max dose of 30 units per day.    Type 1 diabetes, HbA1c goal < 8% (H)       ONE DAILY MULTIPLE VITAMIN Tabs      one daily        prednisoLONE acetate 1 % ophthalmic susp     PRED FORTE     Place 1 drop into both eyes daily        predniSONE 5 MG tablet    DELTASONE     TK 1 T PO QAM        REMICADE IV      Inject 300 mg into the vein Every 2 months        simvastatin 40 MG tablet    ZOCOR    90 tablet    TAKE 1 TABLET BY MOUTH AT BEDTIME TO LOWER CHOLESTEROL.    Hyperlipidemia LDL goal <100       sulfaSALAzine 500 MG tablet    AZULFIDINE     Take 500 mg by mouth 2 times daily 2 tablets twice daily        * TIMOLOL MALEATE OP      Apply  to eye daily. 1 drop in each eye.        * timolol 0.5 % ophthalmic solution    TIMOPTIC          VALTREX 500 MG tablet   Generic drug:  valACYclovir     14 tablet    Take 1 tablet by mouth daily.    Mixed hyperlipidemia, Routine physical examination       MALKA 28 3-0.03 MG per tablet   Generic drug:  drospirenone-ethinyl estradiol     3 months     1 TABLET DAILY    Type I (juvenile type) diabetes mellitus without mention of complication, not stated as uncontrolled, Mixed hyperlipidemia       * Notice:  This list has 2 medication(s) that are the same as other medications prescribed for you. Read the directions carefully, and ask your doctor or other care provider to review them with you.

## 2018-03-16 NOTE — PROGRESS NOTES
Name: Perlita Chavez is a 47 year old woman, self-referred for evaluation of diabetes mellitus.  Previously seen by me at my former clinic ([facility]), here to establish care with [facility].    HPI:  Recent issues:  Here for f/u DM evaluation.  Recalls starting Caltrate-D in 11/2017, also dose increase of fish oil pill per Dr. Davison        1972. Diagnosis of diabetes mellitus age 2 1/2, living in [facility]  Initial treatment with NPH and Regular insulin medications  Had seen Dr. Sarah Sierra/[facility]  Switched to MDI insulin plan using Nv and Levemir insulin    8/19/14. Initial consult with me at my former clinic ([facility])  8/20/14. CDE RD evaluation, also tried demo OmniPod pump  Used Novolog Echo 1/2U pen, with I:C carb counting method  Tried Lantus BID dosing, then Tresiba (expensive), then Basaglar  Current DM meds:   Basaglar Kwikpen 21U sq QAM   Novolog 1:15 ratio    Nv sscale 1U per 40>150  Using Accucheck Guide BG meter, tests 3-4x/day  Exercises with gym workouts, less often  Previous [facility] labs include:  Lab Results   Component Value Date    A1C 8.2 (H) 11/08/2017     05/31/2016    POTASSIUM 3.7 11/08/2017    CHLORIDE 101 05/31/2016    CO2 29 05/10/2016    ANIONGAP 6 05/10/2016     (H) 11/08/2017    BUN 15 05/10/2016    CR 0.83 11/08/2017    GFRESTIMATED 85 11/08/2017    GFRESTBLACK 98 11/08/2017    FINESSE 9.1 05/31/2016    CHOL 149 08/07/2017    TRIG 120 08/07/2017    HDL 57 08/07/2017    LDL 68 08/07/2017    NHDL 92 08/07/2017    UCRR 130 05/10/2016    MICROL 10 05/10/2016    UMALCR 7.92 05/10/2016     DM Complications:   Retinopathy    Previous proliferative DR and bilateral laser PC surgeries    Had cataracts, retinal detachment repair OD, higher occular pressures    Sees Fer Davison and EBER Trevizo/ophthalmology    Last eye exam with Dr. Davison 6/2017    No history of vascular disease.  Takes simvastatin for  hyperlipidemia management.    Works at [employer] in [facility] (Wed-Sunday's?)  Sees Dr. Humberto Melendez/[facility] for general medicine evaluations.  Also sees Dr. Blanco/[facility] rheumatology    PMH/PSH:  Past Medical History:   Diagnosis Date     Background diabetic retinopathy(362.01)     sees retina doc q3 mo. Ophtalmo=Dr Solomon Davison ( [phone]) at [facility], Retina Specialist = Dr James Souza ( [phone])     Hyperlipidemia LDL goal < 130      NONSPECIFIC MEDICAL HISTORY     photosensitivity reaction on lisinopril 3/08     RA (rheumatoid arthritis) (H)     seeing rheumatologist.     Sjoegren syndrome (H)     seeing rheumatologist.     Type 1 diabetes, HbA1c goal < 8% (H) age 3    + retinopathy,seeing endocrinologist   since 08/14     Past Surgical History:   Procedure Laterality Date     C NONSPECIFIC PROCEDURE  8/00/97    T & A     C NONSPECIFIC PROCEDURE  5/00/99    L eye surgery     C NONSPECIFIC PROCEDURE  4/10/01    R cataract + IOL     C NONSPECIFIC PROCEDURE  2002    bilat vitrectomy     C NONSPECIFIC PROCEDURE  7/04    R eye vitrectomy     C NONSPECIFIC PROCEDURE  5/08    R vitrectomy & partial retinal detachment        Family Hx:  Family History   Problem Relation Age of Onset     Arthritis Mother      on celebrex,alive & healthy 2002     Arthritis Father      on celebrex(2002)     HEART DISEASE Father      heart attack 1990, 60 years old (2002)     DIABETES Father      CEREBROVASCULAR DISEASE Father      age 70     Thyroid Disease Sister      on thyroid med for couple of years , 35 yrs now(2002)     CANCER Paternal Grandmother      STOMACH     CEREBROVASCULAR DISEASE Paternal Grandfather      age 90         Social Hx:  Social History     Social History     Marital status:      Spouse name:      Number of children: 0     Years of education: N/A     Occupational History     [employer]       [employer]     Social History Main Topics      "Smoking status: Never Smoker     Smokeless tobacco: Never Used     Alcohol use Yes      Comment: 1 drink every 3 weeks     Drug use: No     Sexual activity: Not Currently     Partners: Male      Comment:      Other Topics Concern     Not on file     Social History Narrative          MEDICATIONS:  has a current medication list which includes the following prescription(s): prednisone, diclofenac, timolol, basaglar, simvastatin, infliximab, cyclosporine, prednisolone acetate, methotrexate, sulfasalazine, diclofenac, folic acid, blood glucose monitoring, novolog flexpen, fish oil triple strength, valacyclovir, insulin pen needle, timolol maleate, ACE/ARB NOT PRESCRIBED, INTENTIONAL,, kristi 28, aspirin, and one daily multiple vitamin.    ROS:     ROS: 10 point ROS neg other than the symptoms noted above in the HPI.    GENERAL: some fatigue; no weight changes, fevers, chills, night sweats.   HEENT: minimal/no vision from right eye, dry eyes/mouth; no dysphagia, odonophagia, diplopia, neck pain  THYROID:  no apparent hyper or hypothyroid symptoms  CV: no chest pain, pressure, palpitations  LUNGS: no SOB, FUENTES, cough, wheezing   ABDOMEN: some morning nausea; no diarrhea, constipation, abdominal pain  EXTREMITIES: no rashes, ulcers, edema  NEUROLOGY: decreased sensation at feet; no headaches, denies changes in vision, tingling, extremitiy numbness   MSK: mild right hand tendinitis pains; no muscle aches, weakness  SKIN: denies rashes or lesions/foot sores  :  Menses regular on Kristi OCP med  PSYCH:  stable mood, no significant anxiety or depression  ENDOCRINE: no heat or cold intolerance    Physical Exam   VS: /81 (BP Location: Right arm, Cuff Size: Adult Regular)  Pulse 85  Ht 5' 3.5\" (1.613 m)  Wt 126 lb (57.2 kg)  BMI 21.97 kg/m2  GENERAL: AXOX3, NAD, well dressed, answering questions appropriately, appears stated age.  THYROID:  normal gland, no apparent nodules or goiter  HEENT: limited right eye " visual acuity; neck non-tender, no exopthalmous, no proptosis, EOMI  CV: RRR, no rubs, gallops, no murmurs  LUNGS: CTAB, no wheezes, rales, or ronchi  ABDOMEN: soft, nontender, nondistended, no organomegaly  EXTREMITIES: no pedal edema  NEUROLOGY: CN grossly intact, no tremors  MSK: grossly intact  SKIN: no rashes, no lesions    LABS:    All pertinent notes, labs, and images personally reviewed by me.     A/P:  Encounter Diagnosis   Name Primary?     Type 1 diabetes mellitus with proliferative retinopathy, unspecified laterality, unspecified proliferative retinopathy type (H) Yes     Comments:  Reviewed the diabetes and health history, in detail.  Recent BG trends mildly elevated, variable, and some early morning low levels.    Plan:  Discussed general issues with the diabetes diagnosis and management  We discussed the hgbA1c test which reflects previous overall glucose levels or control  Discussed the importance of blood glucose (BG) testing to assess glucose trends  Discussed use of the Accu-check Guide BG meter and insulin dose calculator (syncs with iPhone), device settings.  Provided general overview of the multiple daily injection (MDI) plan using rapid acting mealtime and longacting insulin medications    Recommend:  Continue current insulin treatment plan  Accu-check Guide settings:   Adjust meter/phone setting with reduced late evening ISF, change at 9p from 40 to 50 mg/dl   Turned on audible alarm feature  Discussed rapid-acting insulin medications, new option of fast-acting insulin aspart (FIASP), rapid onset- of action, typcal duration of action, vial & pen options.   Will send Fiasp eRx to her pharmacy, but unclear if covered by her insurance   Gave Fiasp discount card  No lab tests ordered for today  Advise having fasting lipid panel testing at least annually  Keep regular f/u ophthalmology evaluation appointments  Addressed patient questions today    Patient Instructions   Continue current insulin  management plan  Use the bolus wizard advisor with the Accucheck Guide meter and iPhone program  Will switch from Novolog to Fiasp with same dosing, new eRx will be sent to pharmacy   Activate the Fiasp discount cared   Also needs update on the Basaglar pen insulin and Accucheck Guide TS's, pen needles   [address]  Sign up for [place] [facility]    Labs ordered today: No orders of the defined types were placed in this encounter.    Radiology/Consults ordered today: None    More than 50% of the time spent with Ms. Chavez on counseling / coordinating her care.  Total appointment time was 30 minutes.    Follow-up:  3 mo.    Saurabh Lowe MD  Endocrinology  [place]

## 2018-03-16 NOTE — LETTER
3/16/2018         RE: Perlita Chavez  [address]  [facility] [address]        Dear Colleague,    Thank you for referring your patient, Perlita Chavez, to the [facility]. Please see a copy of my visit note below.    Name: Perlita Chavez is a 47 year old woman, self-referred for evaluation of diabetes mellitus.  Previously seen by me at my former clinic ([facility]), here to establish care with [facility].    HPI:  Recent issues:  Here for f/u DM evaluation.  Recalls starting Caltrate-D in 11/2017, also dose increase of fish oil pill per Dr. Davison        1972. Diagnosis of diabetes mellitus age 2 1/2, living in [facility]  Initial treatment with NPH and Regular insulin medications  Had seen Dr. Sarah Sierra/ Endocrinology  Switched to MDI insulin plan using Nv and Levemir insulin    8/19/14. Initial consult with me at my former clinic ([facility])  8/20/14. CDE RD evaluation, also tried demo OmniPod pump  Used Novolog Echo 1/2U pen, with I:C carb counting method  Tried Lantus BID dosing, then Tresiba (expensive), then Basaglar  Current DM meds:   Basaglar Kwikpen 21U sq QAM   Novolog 1:15 ratio    Nv sscale 1U per 40>150  Using Accucheck Guide BG meter, tests 3-4x/day  Exercises with gym workouts, less often  Previous  labs include:  Lab Results   Component Value Date    A1C 8.2 (H) 11/08/2017     05/31/2016    POTASSIUM 3.7 11/08/2017    CHLORIDE 101 05/31/2016    CO2 29 05/10/2016    ANIONGAP 6 05/10/2016     (H) 11/08/2017    BUN 15 05/10/2016    CR 0.83 11/08/2017    GFRESTIMATED 85 11/08/2017    GFRESTBLACK 98 11/08/2017    FINESSE 9.1 05/31/2016    CHOL 149 08/07/2017    TRIG 120 08/07/2017    HDL 57 08/07/2017    LDL 68 08/07/2017    NHDL 92 08/07/2017    UCRR 130 05/10/2016    MICROL 10 05/10/2016    UMALCR 7.92 05/10/2016     DM Complications:   Retinopathy    Previous proliferative DR and bilateral laser PC  surgeries    Had cataracts, retinal detachment repair OD, higher occular pressures    Sees Fer Davison and EBER Trevizo/ophthalmology    Last eye exam with Dr. Davison 6/2017    No history of vascular disease.  Takes simvastatin for hyperlipidemia management.    Works at [employer] in [facility] (Wed-Sunday's?)  Sees Dr. Humberto Melendez/[facility] for general medicine evaluations.  Also sees Dr. Blanco/[facility] rheumatology    PMH/PSH:  Past Medical History:   Diagnosis Date     Background diabetic retinopathy(362.01)     sees retina doc q3 mo. Ophtalmo=Dr Solomon Davison ( [phone]) at [facility], Retina Specialist = Dr James Souza ( [phone])     Hyperlipidemia LDL goal < 130      NONSPECIFIC MEDICAL HISTORY     photosensitivity reaction on lisinopril 3/08     RA (rheumatoid arthritis) (H)     seeing rheumatologist.     Sjoegren syndrome (H)     seeing rheumatologist.     Type 1 diabetes, HbA1c goal < 8% (H) age 3    + retinopathy,seeing endocrinologist   since 08/14     Past Surgical History:   Procedure Laterality Date     C NONSPECIFIC PROCEDURE  8/00/97    T & A     C NONSPECIFIC PROCEDURE  5/00/99    L eye surgery     C NONSPECIFIC PROCEDURE  4/10/01    R cataract + IOL     C NONSPECIFIC PROCEDURE  2002    bilat vitrectomy     C NONSPECIFIC PROCEDURE  7/04    R eye vitrectomy     C NONSPECIFIC PROCEDURE  5/08    R vitrectomy & partial retinal detachment        Family Hx:  Family History   Problem Relation Age of Onset     Arthritis Mother      on celebrex,alive & healthy 2002     Arthritis Father      on celebrex(2002)     HEART DISEASE Father      heart attack 1990, 60 years old (2002)     DIABETES Father      CEREBROVASCULAR DISEASE Father      age 70     Thyroid Disease Sister      on thyroid med for couple of years , 35 yrs now(2002)     CANCER Paternal Grandmother      STOMACH     CEREBROVASCULAR DISEASE Paternal Grandfather      age 90         Social Hx:  Social History      Social History     Marital status:      Spouse name:      Number of children: 0     Years of education: N/A     Occupational History     [employer]       [employer]     Social History Main Topics     Smoking status: Never Smoker     Smokeless tobacco: Never Used     Alcohol use Yes      Comment: 1 drink every 3 weeks     Drug use: No     Sexual activity: Not Currently     Partners: Male      Comment:      Other Topics Concern     Not on file     Social History Narrative          MEDICATIONS:  has a current medication list which includes the following prescription(s): prednisone, diclofenac, timolol, basaglar, simvastatin, infliximab, cyclosporine, prednisolone acetate, methotrexate, sulfasalazine, diclofenac, folic acid, blood glucose monitoring, novolog flexpen, fish oil triple strength, valacyclovir, insulin pen needle, timolol maleate, ACE/ARB NOT PRESCRIBED, INTENTIONAL,, kristi 28, aspirin, and one daily multiple vitamin.    ROS:     ROS: 10 point ROS neg other than the symptoms noted above in the HPI.    GENERAL: some fatigue; no weight changes, fevers, chills, night sweats.   HEENT: minimal/no vision from right eye, dry eyes/mouth; no dysphagia, odonophagia, diplopia, neck pain  THYROID:  no apparent hyper or hypothyroid symptoms  CV: no chest pain, pressure, palpitations  LUNGS: no SOB, FUENTES, cough, wheezing   ABDOMEN: some morning nausea; no diarrhea, constipation, abdominal pain  EXTREMITIES: no rashes, ulcers, edema  NEUROLOGY: decreased sensation at feet; no headaches, denies changes in vision, tingling, extremitiy numbness   MSK: mild right hand tendinitis pains; no muscle aches, weakness  SKIN: denies rashes or lesions/foot sores  :  Menses regular on Kristi OCP med  PSYCH:  stable mood, no significant anxiety or depression  ENDOCRINE: no heat or cold intolerance    Physical Exam   VS: /81 (BP Location: Right arm, Cuff Size: Adult Regular)  Pulse 85   "Ht 5' 3.5\" (1.613 m)  Wt 126 lb (57.2 kg)  BMI 21.97 kg/m2  GENERAL: AXOX3, NAD, well dressed, answering questions appropriately, appears stated age.  THYROID:  normal gland, no apparent nodules or goiter  HEENT: limited right eye visual acuity; neck non-tender, no exopthalmous, no proptosis, EOMI  CV: RRR, no rubs, gallops, no murmurs  LUNGS: CTAB, no wheezes, rales, or ronchi  ABDOMEN: soft, nontender, nondistended, no organomegaly  EXTREMITIES: no pedal edema  NEUROLOGY: CN grossly intact, no tremors  MSK: grossly intact  SKIN: no rashes, no lesions    LABS:    All pertinent notes, labs, and images personally reviewed by me.     A/P:  Encounter Diagnosis   Name Primary?     Type 1 diabetes mellitus with proliferative retinopathy, unspecified laterality, unspecified proliferative retinopathy type (H) Yes     Comments:  Reviewed the diabetes and health history, in detail.  Recent BG trends mildly elevated, variable, and some early morning low levels.    Plan:  Discussed general issues with the diabetes diagnosis and management  We discussed the hgbA1c test which reflects previous overall glucose levels or control  Discussed the importance of blood glucose (BG) testing to assess glucose trends  Discussed use of the Accu-check Guide BG meter and insulin dose calculator (syncs with iPhone), device settings.  Provided general overview of the multiple daily injection (MDI) plan using rapid acting mealtime and longacting insulin medications    Recommend:  Continue current insulin treatment plan  Accu-check Guide settings:   Adjust meter/phone setting with reduced late evening ISF, change at 9p from 40 to 50 mg/dl   Turned on audible alarm feature  Discussed rapid-acting insulin medications, new option of fast-acting insulin aspart (FIASP), rapid onset- of action, typcal duration of action, vial & pen options.   Will send Fiasp eRx to her pharmacy, but unclear if covered by her insurance   Gave Fiasp discount card  No " lab tests ordered for today  Advise having fasting lipid panel testing at least annually  Keep regular f/u ophthalmology evaluation appointments  Addressed patient questions today    Patient Instructions   Continue current insulin management plan  Use the bolus wizard advisor with the Accucheck Guide meter and iPhone program  Will switch from Novolog to Fiasp with same dosing, new eRx will be sent to pharmacy   Activate the Fiasp discount cared   Also needs update on the Basaglar pen insulin and Accucheck Guide TS's, pen needles   Uses  pharmacy [address] [place]  Sign up for [facility]    Labs ordered today: No orders of the defined types were placed in this encounter.    Radiology/Consults ordered today: None    More than 50% of the time spent with Ms. Chavez on counseling / coordinating her care.  Total appointment time was 30 minutes.    Follow-up:  3 mo.    Saurabh Lowe MD  Endocrinology  [facility]        Again, thank you for allowing me to participate in the care of your patient.        Sincerely,        Saurabh Lowe MD

## 2018-03-17 LAB
ALT SERPL W P-5'-P-CCNC: 17 U/L (ref 0–50)
ANION GAP SERPL CALCULATED.3IONS-SCNC: 4 MMOL/L (ref 3–14)
BUN SERPL-MCNC: 25 MG/DL (ref 7–30)
CALCIUM SERPL-MCNC: 9.3 MG/DL (ref 8.5–10.1)
CHLORIDE SERPL-SCNC: 104 MMOL/L (ref 94–109)
CO2 SERPL-SCNC: 32 MMOL/L (ref 20–32)
CREAT SERPL-MCNC: 0.84 MG/DL (ref 0.52–1.04)
CREAT UR-MCNC: 215 MG/DL
GFR SERPL CREATININE-BSD FRML MDRD: 72 ML/MIN/1.7M2
GLUCOSE SERPL-MCNC: 57 MG/DL (ref 70–99)
MICROALBUMIN UR-MCNC: 20 MG/L
MICROALBUMIN/CREAT UR: 9.44 MG/G CR (ref 0–25)
POTASSIUM SERPL-SCNC: 4.7 MMOL/L (ref 3.4–5.3)
SODIUM SERPL-SCNC: 140 MMOL/L (ref 133–144)
TSH SERPL DL<=0.005 MIU/L-ACNC: 1.8 MU/L (ref 0.4–4)

## 2018-03-19 DIAGNOSIS — E10.3599 TYPE 1 DIABETES MELLITUS WITH PROLIFERATIVE RETINOPATHY, UNSPECIFIED LATERALITY, UNSPECIFIED PROLIFERATIVE RETINOPATHY TYPE: Primary | ICD-10-CM

## 2018-03-20 ENCOUNTER — TELEPHONE (OUTPATIENT)
Dept: ENDOCRINOLOGY | Facility: CLINIC | Age: 48
End: 2018-03-20

## 2018-03-20 NOTE — TELEPHONE ENCOUNTER
Prior Authorization Retail Medication Request    Medication/Dose: insulin aspart (FIASP FLEXTOUCH) 100 UNIT/ML injection  ICD code (if different than what is on RX):    Previously Tried and Failed:    Rationale:      Insurance Name:  [employer] [place]  Insurance ID:  [id number]       Pharmacy Information (if different than what is on RX)  Name:    Phone:

## 2018-03-20 NOTE — TELEPHONE ENCOUNTER
Dr Mynor FONSECA required for insulin aspart (FIASP FLEXTOUCH) 100 UNIT/ML injection    Would you like to send different Rx or would you like to try for Prior Auth?    Please advise

## 2018-03-20 NOTE — TELEPHONE ENCOUNTER
I recommend the patient have a Prior Authorization (PA) for the new rapid acting insulin FIASP.  This insulin has a more rapid onset of action when compared to aspart (Novolog) insulin.    It is available in both pen and vial options.  In clinical studies, Fiasp was more effective improving [id number] levels (reference:  Gael et al, Diabetes Care 2017, Mar, [id number]).    THOM Lowe MD  [facility]

## 2018-03-21 ENCOUNTER — TELEPHONE (OUTPATIENT)
Dept: EDUCATION SERVICES | Facility: CLINIC | Age: 48
End: 2018-03-21

## 2018-03-21 NOTE — TELEPHONE ENCOUNTER
Message noted.  She has previously used Humalog insulin and also Novolog insulin, notices postmeal hyperglycemia due to slowed absorption of the insulin taken before meals.  Recommend Valerio P[facility]pike. Her pharmacy is the [facility] on [facility].  Thanks.    THOM Lowe MD  [facility]/[place]

## 2018-03-23 NOTE — TELEPHONE ENCOUNTER
PA Initiation    Medication: FIASP FLEXTOUCH 100 UNIT/ML  Insurance Company: [employer] - Phone [phone] Fax [phone]  Pharmacy Filling the Rx: [facility] [id number] [place] [address] [address] AT [facility] [address]  Filling Pharmacy Phone: [phone]  Filling Pharmacy Fax:    Start Date: 3/23/2018

## 2018-03-26 NOTE — TELEPHONE ENCOUNTER
Prior Authorization Approval    Authorization Effective Date: 3/16/2018  Authorization Expiration Date: 3/16/2019  Medication: FIASP FLEXTOUCH 100 UNIT/ML - APPROVED  Approved Dose/Quantity: DAILY  Reference #: [id number]   Insurance Company: [employer] - Phone [phone] Fax [phone]  Expected CoPay: NA     CoPay Card Available:      Foundation Assistance Needed:    Which Pharmacy is filling the prescription (Not needed for infusion/clinic administered): [facility] [address] [place] [address] [address] AT [facility] [address]  Pharmacy Notified: Yes  Patient Notified: Yes

## 2018-05-01 NOTE — TELEPHONE ENCOUNTER
Message regarding failed attempts to schedule Diabetes Education evaluation noted.    THOM Lowe MD   [facility]

## 2018-05-01 NOTE — TELEPHONE ENCOUNTER
Diabetes Education Scheduling Outreach #2:    Call to patient to schedule. Left message with phone number to call to schedule.    Letter sent to patient requesting to call to schedule.    Mena Valdivia OnCall  Diabetes and Nutrition Scheduling

## 2018-05-23 ENCOUNTER — TRANSFERRED RECORDS (OUTPATIENT)
Dept: HEALTH INFORMATION MANAGEMENT | Facility: CLINIC | Age: 48
End: 2018-05-23

## 2018-05-23 LAB
ALT SERPL-CCNC: 19 IU/L (ref 5–35)
AST SERPL-CCNC: 23 U/L (ref 5–34)
CREAT SERPL-MCNC: 0.78 MG/DL (ref 0.5–1.3)
GFR SERPL CREATININE-BSD FRML MDRD: 84.1 ML/MIN/1.73M2

## 2018-06-01 ENCOUNTER — TRANSFERRED RECORDS (OUTPATIENT)
Dept: HEALTH INFORMATION MANAGEMENT | Facility: CLINIC | Age: 48
End: 2018-06-01

## 2018-06-19 ENCOUNTER — OFFICE VISIT (OUTPATIENT)
Dept: ENDOCRINOLOGY | Facility: CLINIC | Age: 48
End: 2018-06-19
Payer: COMMERCIAL

## 2018-06-19 VITALS
SYSTOLIC BLOOD PRESSURE: 115 MMHG | BODY MASS INDEX: 21.34 KG/M2 | HEIGHT: 64 IN | HEART RATE: 88 BPM | DIASTOLIC BLOOD PRESSURE: 70 MMHG | WEIGHT: 125 LBS

## 2018-06-19 DIAGNOSIS — E10.3599 TYPE 1 DIABETES MELLITUS WITH PROLIFERATIVE RETINOPATHY, MACULAR EDEMA PRESENCE UNSPECIFIED, UNSPECIFIED LATERALITY: Primary | ICD-10-CM

## 2018-06-19 LAB
ANION GAP SERPL CALCULATED.3IONS-SCNC: 7 MMOL/L (ref 3–14)
BUN SERPL-MCNC: 18 MG/DL (ref 7–30)
CALCIUM SERPL-MCNC: 8.8 MG/DL (ref 8.5–10.1)
CHLORIDE SERPL-SCNC: 106 MMOL/L (ref 94–109)
CO2 SERPL-SCNC: 28 MMOL/L (ref 20–32)
CREAT SERPL-MCNC: 0.8 MG/DL (ref 0.52–1.04)
GFR SERPL CREATININE-BSD FRML MDRD: 77 ML/MIN/1.7M2
GLUCOSE SERPL-MCNC: 260 MG/DL (ref 70–99)
HBA1C MFR BLD: 8.2 % (ref 0–5.6)
POTASSIUM SERPL-SCNC: 4.7 MMOL/L (ref 3.4–5.3)
SODIUM SERPL-SCNC: 141 MMOL/L (ref 133–144)

## 2018-06-19 PROCEDURE — 36415 COLL VENOUS BLD VENIPUNCTURE: CPT | Performed by: PATHOLOGY

## 2018-06-19 PROCEDURE — 80048 BASIC METABOLIC PNL TOTAL CA: CPT | Performed by: PATHOLOGY

## 2018-06-19 PROCEDURE — 99214 OFFICE O/P EST MOD 30 MIN: CPT | Performed by: INTERNAL MEDICINE

## 2018-06-19 PROCEDURE — 83036 HEMOGLOBIN GLYCOSYLATED A1C: CPT | Performed by: PATHOLOGY

## 2018-06-19 NOTE — PROGRESS NOTES
Name: Perlita Chavez      HPI:  Recent issues:  Here for f/u DM evaluation  Right ear discomfort, some eye watering, sinus pressure, more tired/sleeping        1972. Diagnosis of diabetes mellitus age 2 1/2, living in [facility]  Initial treatment with NPH and Regular insulin medications  Had seen Dr. Sarah Sierra/[facility]  Switched to MDI insulin plan using Nv and Levemir insulin    8/19/14. Initial consult with me at my former clinic ([facility])  8/20/14. CDE RD evaluation, also tried demo OmniPod pump  Used Novolog Echo 1/2U pen, with I:C carb counting method  Tried Lantus BID dosing, then Tresiba (expensive), then Basaglar   Previously used Novolog insulin  5/2018. Switched to Fiasp 3/2018  Current DM meds:   Basaglar Kwikpen 19U sq QAM   Fiasp 1:15 ratio (typically 6-10 per meal)    Nv sscale 1U per 40>150  Using Accucheck Guide BG meter, tests 3-4x/day  Exercises with gym workouts, less often  Previous [facility] labs include:  Lab Results   Component Value Date    A1C 8.4 (H) 03/16/2018     03/16/2018    POTASSIUM 4.7 03/16/2018    CHLORIDE 104 03/16/2018    CO2 32 03/16/2018    ANIONGAP 4 03/16/2018    GLC 57 (L) 03/16/2018    BUN 25 03/16/2018    CR 0.780 05/23/2018    GFRESTIMATED 84.1 05/23/2018    GFRESTBLACK 87 03/16/2018    FINESSE 9.3 03/16/2018    CHOL 149 08/07/2017    TRIG 120 08/07/2017    HDL 57 08/07/2017    LDL 68 08/07/2017    NHDL 92 08/07/2017    UCRR 215 03/16/2018    MICROL 20 03/16/2018    UMALCR 9.44 03/16/2018     DM Complications:   Retinopathy    Previous proliferative DR and bilateral laser PC surgeries    Had cataracts, retinal detachment repair OD, higher occular pressures    Sees Fer Davison and EBER Trevizo/ophthalmology    Last eye exam with Dr. Davison 6/2017    No history of vascular disease.  Takes simvastatin for hyperlipidemia management.      Works at [employer] in [facility] (Wed-Sunday's?)  Sees Dr. Humberto Melendez/[facility] for general medicine  evaluations.  Also sees Dr. Blanco/[facility]    PMH/PSH:  Past Medical History:   Diagnosis Date     Background diabetic retinopathy(362.01)     sees retina doc q3 mo. Ophtalmo=Dr Solomon Davison ( [phone]) at [facility], Retina Specialist = Dr James Souza ( [phone])     Hyperlipidemia LDL goal < 130      NONSPECIFIC MEDICAL HISTORY     photosensitivity reaction on lisinopril 3/08     RA (rheumatoid arthritis) (H)     seeing rheumatologist.     Sjoegren syndrome (H)     seeing rheumatologist.     Type 1 diabetes, HbA1c goal < 8% (H) age 3    + retinopathy,seeing endocrinologist   since 08/14     Past Surgical History:   Procedure Laterality Date     C NONSPECIFIC PROCEDURE  8/00/97    T & A     C NONSPECIFIC PROCEDURE  5/00/99    L eye surgery     C NONSPECIFIC PROCEDURE  4/10/01    R cataract + IOL     C NONSPECIFIC PROCEDURE  2002    bilat vitrectomy     C NONSPECIFIC PROCEDURE  7/04    R eye vitrectomy     C NONSPECIFIC PROCEDURE  5/08    R vitrectomy & partial retinal detachment        Family Hx:  Family History   Problem Relation Age of Onset     Arthritis Mother      on celebrex,alive & healthy 2002     Arthritis Father      on celebrex(2002)     HEART DISEASE Father      heart attack 1990, 60 years old (2002)     Diabetes Father      Cerebrovascular Disease Father      age 70     Thyroid Disease Sister      on thyroid med for couple of years , 35 yrs now(2002)     Cancer Paternal Grandmother      STOMACH     Cerebrovascular Disease Paternal Grandfather      age 90         Social Hx:  Social History     Social History     Marital status:      Spouse name:      Number of children: 0     Years of education: N/A     Occupational History     [employer]       [employer]     Social History Main Topics     Smoking status: Never Smoker     Smokeless tobacco: Never Used     Alcohol use Yes      Comment: 1 drink every 3 weeks     Drug use: No     Sexual activity:  "Not Currently     Partners: Male      Comment:      Other Topics Concern     Not on file     Social History Narrative          MEDICATIONS:  has a current medication list which includes the following prescription(s): ACE/ARB NOT PRESCRIBED, INTENTIONAL,, aspirin, basaglar, blood glucose monitoring, cyclosporine, diclofenac, diclofenac, folic acid, infliximab, insulin aspart, insulin pen needle, methotrexate, fish oil triple strength, one daily multiple vitamin, prednisolone acetate, prednisone, simvastatin, sulfasalazine, timolol, timolol maleate, valacyclovir, and kristi 28.    ROS:     ROS: 10 point ROS neg other than the symptoms noted above in the HPI.    GENERAL: some fatigue; no weight changes, fevers, chills, night sweats.   HEENT: some sinus congestion and right ear pressure; minimal/no vision from right eye, dry eyes/mouth; no dysphagia, odonophagia, diplopia  THYROID:  no apparent hyper or hypothyroid symptoms  CV: no chest pain, pressure, palpitations  LUNGS: no SOB, FUENTES, cough, wheezing   ABDOMEN: rare morning nausea; no diarrhea, constipation, abdominal pain  EXTREMITIES: no rashes, ulcers, edema  NEUROLOGY: decreased sensation at feet; no headaches, denies changes in vision, tingling, extremitiy numbness   MSK: mild hand tendinitis pains; no muscle aches, weakness  SKIN: denies rashes or lesions/foot sores  :  menses regular on Kristi OCP med  PSYCH:  stable mood, no significant anxiety or depression  ENDOCRINE: no heat or cold intolerance    Physical Exam   VS: /70 (BP Location: Right arm, Cuff Size: Adult Regular)  Pulse 88  Ht 5' 3.5\" (1.613 m)  Wt 125 lb (56.7 kg)  BMI 21.8 kg/m2  GENERAL: AXOX3, NAD, well dressed, answering questions appropriately, appears stated age.  THYROID:  normal gland, no apparent nodules or goiter  HEENT: ear TM's normal, limited right eye visual acuity; neck non-tender, no exopthalmous, no proptosis, EOMI  CV: RRR, no rubs, gallops, no murmurs  LUNGS: " CTAB, no wheezes, rales, or ronchi  ABDOMEN: soft, nontender, nondistended, no organomegaly  EXTREMITIES: no pedal edema  NEUROLOGY: CN grossly intact, no tremors  MSK: grossly intact  SKIN: no rashes, no lesions    LABS:    All pertinent notes, labs, and images personally reviewed by me.     A/P:  Encounter Diagnosis   Name Primary?     Type 1 diabetes mellitus with proliferative retinopathy, macular edema presence unspecified, unspecified laterality (H) Yes     Comments:  Reviewed the diabetes and health history, in detail.  Recent BG trends mildly elevated, variable.    Plan:  Discussed general issues with the diabetes diagnosis and managemen  We discussed the hgbA1c test which reflects previous overall glucose levels or control  Discussed the importance of blood glucose (BG) testing to assess glucose trends  Discussed use of the Accu-check Guide BG meter and insulin dose calculator (syncs with iPhone), device settings.  Provided general overview of the multiple daily injection (MDI) plan using rapid acting mealtime and longacting insulin medications    Recommend:  Continue current insulin treatment plan   Difficult to assess effectiveness of the Fiasp insulin however  Use the Accu-check Guide settings with smart-phone use.  Check labs today  Symptomatic treatment for the head and ear congestion, no clear signs of bacterial infection  Advise having fasting lipid panel testing at least annually  Keep regular f/u ophthalmology evaluation appointments    Addressed patient questions today    Labs ordered today:   Orders Placed This Encounter   Procedures     Basic metabolic panel     Hemoglobin A1c     Radiology/Consults ordered today: None    More than 50% of the time spent with Ms. Chavez on counseling / coordinating her care.  Total appointment time was 25 minutes.    Follow-up:  3 mo.    Saurabh Lowe MD  Endocrinology  [facility]

## 2018-06-19 NOTE — MR AVS SNAPSHOT
"              After Visit Summary   6/19/2018    Perlita Chavez    MRN: [id number]           Patient Information     Date Of Birth          1970        Visit Information        Provider Department      6/19/2018 10:00 AM Saurabh Lowe MD [facility]        Today's Diagnoses     Type 1 diabetes mellitus with proliferative retinopathy, macular edema presence unspecified, unspecified laterality (H)    -  1       Follow-ups after your visit        Follow-up notes from your care team     Return in about 3 months (around 9/19/2018).      Who to contact     If you have questions or need follow up information about today's clinic visit or your schedule please contact [facility] directly at [phone].  Normal or non-critical lab and imaging results will be communicated to you by MyChart, letter or phone within 4 business days after the clinic has received the results. If you do not hear from us within 7 days, please contact the clinic through Department of Health and Human Serviceshart or phone. If you have a critical or abnormal lab result, we will notify you by phone as soon as possible.  Submit refill requests through [facility] or call your pharmacy and they will forward the refill request to us. Please allow 3 business days for your refill to be completed.          Additional Information About Your Visit        MyChart Information     [facility] gives you secure access to your electronic health record. If you see a primary care provider, you can also send messages to your care team and make appointments. If you have questions, please call your primary care clinic.  If you do not have a primary care provider, please call [phone] and they will assist you.        Care EveryWhere ID     This is your Care EveryWhere ID. This could be used by other organizations to access your [place] medical records  [id number]        Your Vitals Were     Pulse Height BMI (Body Mass Index)             88 5' 3.5\" (1.613 m) 21.8 kg/m2       "    Blood Pressure from Last 3 Encounters:   06/19/18 115/70   03/16/18 136/81   10/10/17 104/70    Weight from Last 3 Encounters:   06/19/18 125 lb (56.7 kg)   03/16/18 126 lb (57.2 kg)   10/10/17 125 lb 12.8 oz (57.1 kg)              We Performed the Following     Basic metabolic panel     Hemoglobin A1c        Primary Care Provider Office Phone # Fax #    Calos FLETCHER MD [facility] [phone]       [address] [facility] [address]        Equal Access to Services     [facility]: Hadii aad ku hadasho Soomaali, waaxda luqadaha, qaybta kaalmada adeegyada, peewee garner . So [facility] [phone].    ATENCIÓN: Si habla español, tiene a pekrins disposición servicios gratuitos de asistencia lingüística. [facility] [phone].    We comply with applicable federal civil rights laws and [place] laws. We do not discriminate on the basis of race, color, national origin, age, disability, sex, sexual orientation, or gender identity.            Thank you!     Thank you for choosing [facility]  for your care. Our goal is always to provide you with excellent care. Hearing back from our patients is one way we can continue to improve our services. Please take a few minutes to complete the written survey that you may receive in the mail after your visit with us. Thank you!             Your Updated Medication List - Protect others around you: Learn how to safely use, store and throw away your medicines at www.disposemymeds.org.          This list is accurate as of 6/19/18 10:43 AM.  Always use your most recent med list.                   Brand Name Dispense Instructions for use Diagnosis    ACE/ARB/ARNI NOT PRESCRIBED (INTENTIONAL)      1 each by Other route continuous prn.    Type 1 diabetes, HbA1c goal < 8% (H), Hyperlipidemia LDL goal < 130       aspirin 81 MG tablet     100    ONE DAILY        BASAGLAR 100 UNIT/ML injection     15 mL    Inject 22-24 Units Subcutaneous  every morning    Type 1 diabetes mellitus with stable proliferative retinopathy, unspecified laterality (H)       blood glucose monitoring test strip    EDWIN CONTOUR    400 strip    1 strip by In Vitro route 4 times daily (before meals and nightly) Checks 4-5 x daily    Type 1 diabetes, HbA1c goal < 8% (H)       cycloSPORINE 0.05 % ophthalmic emulsion    RESTASIS     Place 1 drop into both eyes 2 times daily        diclofenac 75 MG EC tablet    VOLTAREN     TK 1 T PO BID        DICLOFENAC PO      Take by mouth 2 times daily        FISH OIL TRIPLE STRENGTH 1400 MG Caps      Take 2 tablets by mouth daily.        FOLIC ACID PO      Take 1 mg by mouth daily        insulin aspart 100 UNIT/ML injection    FIASP FLEXTOUCH    15 mL    Inject 3-8 units premeal, as directed    Type 1 diabetes mellitus with proliferative retinopathy, unspecified laterality, unspecified proliferative retinopathy type (H)       insulin pen needle 31G X 5 MM    B-D U/F    1 Box    3 Box See Admin Instructions. Use 5 x daily or as directed.    Type 1 diabetes, HbA1c goal < 8% (H)       METHOTREXATE PO      Take by mouth once a week 7 tablets once a week        ONE DAILY MULTIPLE VITAMIN Tabs      one daily        prednisoLONE acetate 1 % ophthalmic susp    PRED FORTE     Place 1 drop into both eyes daily        predniSONE 5 MG tablet    DELTASONE     TK 1 T PO QAM        REMICADE IV      Inject 300 mg into the vein Every 2 months        simvastatin 40 MG tablet    ZOCOR    90 tablet    TAKE 1 TABLET BY MOUTH AT BEDTIME TO LOWER CHOLESTEROL.    Hyperlipidemia LDL goal <100       sulfaSALAzine 500 MG tablet    AZULFIDINE     Take 500 mg by mouth 2 times daily 2 tablets twice daily        * TIMOLOL MALEATE OP      Apply  to eye daily. 1 drop in each eye.        * timolol 0.5 % ophthalmic solution    TIMOPTIC          VALTREX 500 MG tablet   Generic drug:  valACYclovir     14 tablet    Take 1 tablet by mouth daily.    Mixed hyperlipidemia, Routine  physical examination       MALKA 28 3-0.03 MG per tablet   Generic drug:  drospirenone-ethinyl estradiol     3 months     1 TABLET DAILY    Type I (juvenile type) diabetes mellitus without mention of complication, not stated as uncontrolled, Mixed hyperlipidemia       * Notice:  This list has 2 medication(s) that are the same as other medications prescribed for you. Read the directions carefully, and ask your doctor or other care provider to review them with you.

## 2018-08-02 ENCOUNTER — MYC MEDICAL ADVICE (OUTPATIENT)
Dept: ENDOCRINOLOGY | Facility: CLINIC | Age: 48
End: 2018-08-02

## 2018-08-02 DIAGNOSIS — E10.3599 TYPE 1 DIABETES MELLITUS WITH PROLIFERATIVE RETINOPATHY, UNSPECIFIED LATERALITY, UNSPECIFIED PROLIFERATIVE RETINOPATHY TYPE: Primary | ICD-10-CM

## 2018-08-02 NOTE — TELEPHONE ENCOUNTER
Message noted, Accu-check Guide meter test strip Rx sent to her pharmacy.    THOM Lowe MD  Endocrinology   [facility]

## 2018-08-02 NOTE — TELEPHONE ENCOUNTER
Dr. Lowe,    Pt requesting ACCU-CHEK GUIDE test strips per [employer] message. This RX is not listed on their medication list. Current RX is Roxie Contour test strip (last ordered in 2014)  Please order, if appropriate.    Thank you,  Mihai ROLLINS RN

## 2018-08-12 DIAGNOSIS — E10.3599 TYPE 1 DIABETES MELLITUS WITH PROLIFERATIVE RETINOPATHY, UNSPECIFIED LATERALITY, UNSPECIFIED PROLIFERATIVE RETINOPATHY TYPE: Primary | ICD-10-CM

## 2018-09-26 ENCOUNTER — TRANSFERRED RECORDS (OUTPATIENT)
Dept: HEALTH INFORMATION MANAGEMENT | Facility: CLINIC | Age: 48
End: 2018-09-26

## 2018-10-05 ENCOUNTER — TRANSFERRED RECORDS (OUTPATIENT)
Dept: HEALTH INFORMATION MANAGEMENT | Facility: CLINIC | Age: 48
End: 2018-10-05

## 2018-10-21 DIAGNOSIS — E78.5 HYPERLIPIDEMIA LDL GOAL <100: ICD-10-CM

## 2018-10-22 RX ORDER — SIMVASTATIN 40 MG
TABLET ORAL
Qty: 30 TABLET | Refills: 0 | Status: SHIPPED | OUTPATIENT
Start: 2018-10-22 | End: 2018-11-15

## 2018-10-22 RX ORDER — SIMVASTATIN 40 MG
TABLET ORAL
Qty: 90 TABLET | Refills: 0 | OUTPATIENT
Start: 2018-10-22

## 2018-10-22 NOTE — TELEPHONE ENCOUNTER
"Requested Prescriptions   Pending Prescriptions Disp Refills     simvastatin (ZOCOR) 40 MG tablet [Pharmacy Med Name: SIMVASTATIN 40MG TABLETS] 90 tablet 0    Last Written Prescription Date:  10/10/2017  Last Fill Quantity: 90,  # refills: 3   Last office visit: 10/10/2017 with prescribing provider:     Future Office Visit:   Sig: TAKE 1 TABLET BY MOUTH AT BEDTIME TO LOWER CHOLESTEROL    Statins Protocol Failed    10/21/2018  6:30 PM       Failed - LDL on file in past 12 months    Recent Labs   Lab Test 08/07/17   LDL  68            Failed - Recent (12 mo) or future (30 days) visit within the authorizing provider's specialty    Patient had office visit in the last 12 months or has a visit in the next 30 days with authorizing provider or within the authorizing provider's specialty.  See \"Patient Info\" tab in inbasket, or \"Choose Columns\" in Meds & Orders section of the refill encounter.             Passed - No abnormal creatine kinase in past 12 months    No lab results found.            Passed - Patient is age 18 or older       Passed - No active pregnancy on record       Passed - No positive pregnancy test in past 12 months        simvastatin (ZOCOR) 40 MG tablet [Pharmacy Med Name: SIMVASTATIN 40MG TABLETS] 90 tablet 0    Last Written Prescription Date:  10/10/2017  Last Fill Quantity: 90,  # refills: 3   Last office visit: 10/10/2017 with prescribing provider:     Future Office Visit:   Sig: TAKE 1 TABLET BY MOUTH AT BEDTIME TO LOWER CHOLESTEROL    Statins Protocol Failed    10/21/2018  6:30 PM       Failed - LDL on file in past 12 months    Recent Labs   Lab Test 08/07/17   LDL  68            Failed - Recent (12 mo) or future (30 days) visit within the authorizing provider's specialty    Patient had office visit in the last 12 months or has a visit in the next 30 days with authorizing provider or within the authorizing provider's specialty.  See \"Patient Info\" tab in inbasket, or \"Choose Columns\" in Meds & " Orders section of the refill encounter.             Passed - No abnormal creatine kinase in past 12 months    No lab results found.            Passed - Patient is age 18 or older       Passed - No active pregnancy on record       Passed - No positive pregnancy test in past 12 months

## 2018-10-22 NOTE — TELEPHONE ENCOUNTER
Routing refill request to provider for review/approval because:  Labs not current:  LDL  Patient needs to be seen because it has been more than 1 year since last office visit.

## 2018-10-31 ENCOUNTER — OFFICE VISIT (OUTPATIENT)
Dept: INTERNAL MEDICINE | Facility: CLINIC | Age: 48
End: 2018-10-31
Payer: COMMERCIAL

## 2018-10-31 VITALS
BODY MASS INDEX: 21.08 KG/M2 | HEART RATE: 104 BPM | WEIGHT: 123.5 LBS | RESPIRATION RATE: 17 BRPM | SYSTOLIC BLOOD PRESSURE: 116 MMHG | DIASTOLIC BLOOD PRESSURE: 74 MMHG | OXYGEN SATURATION: 94 % | TEMPERATURE: 98.3 F | HEIGHT: 64 IN

## 2018-10-31 DIAGNOSIS — D84.9 IMMUNOSUPPRESSED STATUS (H): ICD-10-CM

## 2018-10-31 DIAGNOSIS — Z23 NEED FOR PROPHYLACTIC VACCINATION AND INOCULATION AGAINST INFLUENZA: ICD-10-CM

## 2018-10-31 DIAGNOSIS — M06.9 RHEUMATOID ARTHRITIS, INVOLVING UNSPECIFIED SITE, UNSPECIFIED RHEUMATOID FACTOR PRESENCE: ICD-10-CM

## 2018-10-31 DIAGNOSIS — E10.39 TYPE 1 DIABETES MELLITUS WITH OTHER OPHTHALMIC COMPLICATION (H): ICD-10-CM

## 2018-10-31 DIAGNOSIS — Z00.00 ENCOUNTER FOR ROUTINE ADULT HEALTH EXAMINATION WITHOUT ABNORMAL FINDINGS: Primary | ICD-10-CM

## 2018-10-31 DIAGNOSIS — Z23 NEED FOR PNEUMOCOCCAL VACCINATION: ICD-10-CM

## 2018-10-31 DIAGNOSIS — N63.0 LUMP OR MASS IN BREAST: ICD-10-CM

## 2018-10-31 LAB
HBA1C MFR BLD: 7.7 % (ref 0–5.6)
HGB BLD-MCNC: 11.8 G/DL (ref 11.7–15.7)

## 2018-10-31 PROCEDURE — 99396 PREV VISIT EST AGE 40-64: CPT | Performed by: INTERNAL MEDICINE

## 2018-10-31 PROCEDURE — 84443 ASSAY THYROID STIM HORMONE: CPT | Performed by: INTERNAL MEDICINE

## 2018-10-31 PROCEDURE — 80061 LIPID PANEL: CPT | Performed by: INTERNAL MEDICINE

## 2018-10-31 PROCEDURE — 83036 HEMOGLOBIN GLYCOSYLATED A1C: CPT | Performed by: INTERNAL MEDICINE

## 2018-10-31 PROCEDURE — 36415 COLL VENOUS BLD VENIPUNCTURE: CPT | Performed by: INTERNAL MEDICINE

## 2018-10-31 PROCEDURE — 80053 COMPREHEN METABOLIC PANEL: CPT | Performed by: INTERNAL MEDICINE

## 2018-10-31 PROCEDURE — 90472 IMMUNIZATION ADMIN EACH ADD: CPT | Performed by: INTERNAL MEDICINE

## 2018-10-31 PROCEDURE — 90471 IMMUNIZATION ADMIN: CPT | Performed by: INTERNAL MEDICINE

## 2018-10-31 PROCEDURE — 90732 PPSV23 VACC 2 YRS+ SUBQ/IM: CPT | Performed by: INTERNAL MEDICINE

## 2018-10-31 PROCEDURE — 90686 IIV4 VACC NO PRSV 0.5 ML IM: CPT | Performed by: INTERNAL MEDICINE

## 2018-10-31 PROCEDURE — 85018 HEMOGLOBIN: CPT | Performed by: INTERNAL MEDICINE

## 2018-10-31 RX ORDER — BRIMONIDINE TARTRATE 2 MG/ML
SOLUTION/ DROPS OPHTHALMIC
Refills: 3 | COMMUNITY
Start: 2018-10-16

## 2018-10-31 RX ORDER — DORZOLAMIDE HYDROCHLORIDE AND TIMOLOL MALEATE 20; 5 MG/ML; MG/ML
SOLUTION/ DROPS OPHTHALMIC
Refills: 6 | COMMUNITY
Start: 2018-09-19

## 2018-10-31 ASSESSMENT — ENCOUNTER SYMPTOMS
NAUSEA: 0
WEAKNESS: 0
EYE PAIN: 0
COUGH: 0
DIARRHEA: 0
FREQUENCY: 0
HEARTBURN: 0
DIZZINESS: 0
NERVOUS/ANXIOUS: 0
HEMATURIA: 0
CHILLS: 0
ABDOMINAL PAIN: 0
FEVER: 0
CONSTIPATION: 0
PARESTHESIAS: 0
DYSURIA: 0
SHORTNESS OF BREATH: 0
SORE THROAT: 0
JOINT SWELLING: 0
MYALGIAS: 0
HEADACHES: 0
PALPITATIONS: 0
HEMATOCHEZIA: 0
BREAST MASS: 0
ARTHRALGIAS: 0

## 2018-10-31 NOTE — MR AVS SNAPSHOT
After Visit Summary   10/31/2018    Perlita Chavez    MRN: [id number]           Patient Information     Date Of Birth          1970        Visit Information        Provider Department      10/31/2018 9:40 AM Calos Melendez MD [facility]        Today's Diagnoses     Encounter for routine adult health examination without abnormal findings    -  1    Type 1 diabetes mellitus with other ophthalmic complication (H)        Rheumatoid arthritis, involving unspecified site, unspecified rheumatoid factor presence (H)        Immunosuppressed status (H)        Lump or mass in breast          Care Instructions      Preventive Health Recommendations  Female Ages 40 to 49    Yearly exam:     See your health care provider every year in order to  1. Review health changes.   2. Discuss preventive care.    3. Review your medicines if your doctor prescribed any.      Get a Pap test every three years (unless you have an abnormal result and your provider advises testing more often).      If you get Pap tests with HPV test, you only need to test every 5 years, unless you have an abnormal result. You do not need a Pap test if your uterus was removed (hysterectomy) and you have not had cancer.      You should be tested each year for STDs (sexually transmitted diseases), if you're at risk.     Ask your doctor if you should have a mammogram.      Have a colonoscopy (test for colon cancer) if someone in your family has had colon cancer or polyps before age 50.       Have a cholesterol test every 5 years.       Have a diabetes test (fasting glucose) after age 45. If you are at risk for diabetes, you should have this test every 3 years.    Shots: Get a flu shot each year. Get a tetanus shot every 10 years.     Nutrition:     Eat at least 5 servings of fruits and vegetables each day.    Eat whole-grain bread, whole-wheat pasta and brown rice instead of white grains and rice.    Get adequate  Calcium and Vitamin D.      Lifestyle    Exercise at least 150 minutes a week (an average of 30 minutes a day, 5 days a week). This will help you control your weight and prevent disease.    Limit alcohol to one drink per day.    No smoking.     Wear sunscreen to prevent skin cancer.    See your dentist every six months for an exam and cleaning.          Follow-ups after your visit        Your next 10 appointments already scheduled     Nov 07, 2018 12:45 PM CST   MA SCREENING DIGITAL BILATERAL with [id number]   [facility] ([facility])    [address] [facility], [address]  [facility] [address]   [phone]           How do I prepare for my exam? (Food and drink instructions) No Food and Drink Restrictions.  How do I prepare for my exam? (Other instructions) Do not use any powder, lotion or deodorant under your arms or on your breast. If you do, we will ask you to remove it before your exam.  What should I wear: Wear comfortable, two-piece clothing.  How long does the exam take: Most scans will take 15 minutes.  What should I bring: Bring any previous mammograms from other facilities or have them mailed to the breast center.  Do I need a :  No  is needed.  What do I need to tell my doctor: If you have any allergies, tell your care team.  What should I do after the exam: No restrictions, You may resume normal activities.  What is this test: This test is an x-ray of the breast to look for breast disease. The breast is pressed between two plates to flatten and spread the tissue. An X-ray is taken of the breast from different angles.  Who should I call with questions: If you have any questions, please call the Imaging Department where you will have your exam. Directions, parking instructions, and other information is available on our website, [email or web address].  Other information about my exam Three-dimensional (3D) mammograms are available at [facility] locations in  "[facility], [facility], [place], [facility] and [place].  Health locations include [place] and the [facility] in [place].  Benefits of 3D mammograms include * Improved rate of cancer detection * Decreases your chance of having to go back for more tests, which means fewer: * \"False-positive\" results (This means that there is an abnormal area but it isn't cancer.) * Invasive testing procedures, such as a biopsy or surgery * Can provide clearer images of the breast if you have dense breast tissue.  *3D mammography is an optional exam that anyone can have with a 2D mammogram. It doesn't replace or take the place of a 2D mammogram. 2D mammograms remain an effective screening test for all women.  Not all insurance companies cover the cost of a 3D mammogram. Check with your insurance. Three-dimensional (3D) mammograms are available at [facility] locations in [facility], [facility], [facility], and [place]. Health locations include [place] and [facility] in [place]. Benefits of 3D mammograms include: - Improved rate of cancer detection - Decreases your chance of having to go back for more tests, which means fewer: - \"False-positive\" results (This means that there is an abnormal area but it isn't cancer.) - Invasive testing procedures, such as a biopsy or surgery - Can provide clearer images of the breast if you have dense breast tissue. 3D mammography is an optional exam that anyone can have with a 2D mammogram. It doesn't replace or take the place of a 2D mammogram. 2D mammograms remain an effective screening test for all women.  Not all insurance companies cover the cost of a 3D mammogram. Check with your insurance.              Future tests that were ordered for you today     Open Future Orders        Priority Expected Expires Ordered    MA Diagnostic Digital Bilateral Routine  10/31/2019 10/31/2018    " "MA Screening Digital Bilateral Routine  10/31/2019 10/31/2018            Who to contact     If you have questions or need follow up information about today's clinic visit or your schedule please contact [facility] directly at [phone].  Normal or non-critical lab and imaging results will be communicated to you by [facility], letter or phone within 4 business days after the clinic has received the results. If you do not hear from us within 7 days, please contact the clinic through [facility] or phone. If you have a critical or abnormal lab result, we will notify you by phone as soon as possible.  Submit refill requests through [facility] or call your pharmacy and they will forward the refill request to us. Please allow 3 business days for your refill to be completed.          Additional Information About Your Visit        [facility] Information     [facility] gives you secure access to your electronic health record. If you see a primary care provider, you can also send messages to your care team and make appointments. If you have questions, please call your primary care clinic.  If you do not have a primary care provider, please call [phone] and they will assist you.        Care EveryWhere ID     This is your Care EveryWhere ID. This could be used by other organizations to access your [place] medical records  [id number]        Your Vitals Were     Pulse Temperature Respirations Height Pulse Oximetry BMI (Body Mass Index)    104 98.3  F (36.8  C) (Oral) 17 5' 3.5\" (1.613 m) 94% 21.53 kg/m2       Blood Pressure from Last 3 Encounters:   10/31/18 116/74   06/19/18 115/70   03/16/18 136/81    Weight from Last 3 Encounters:   10/31/18 123 lb 8 oz (56 kg)   06/19/18 125 lb (56.7 kg)   03/16/18 126 lb (57.2 kg)              We Performed the Following     Comprehensive metabolic panel     Hemoglobin A1c     Hemoglobin     Lipid panel reflex to direct LDL Fasting     TSH with free T4 reflex        Primary Care " Provider Office Phone # Fax #    Krishnakumari JUSTINE MD [facility] [phone]       [address] [facility] [address]        Equal Access to Services     [facility] : Hadii aad ku haddeepikao Soomaali, waaxda luqadaha, qaybta kaalmada adelaurenyada, peewee ruddkimberly mona. So [facility] [phone].    ATENCIÓN: Si habla español, tiene a perkins disposición servicios gratuitos de asistencia lingüística. Llame al [phone].    We comply with applicable federal civil rights laws and [place] laws. We do not discriminate on the basis of race, color, national origin, age, disability, sex, sexual orientation, or gender identity.            Thank you!     Thank you for choosing [facility]  for your care. Our goal is always to provide you with excellent care. Hearing back from our patients is one way we can continue to improve our services. Please take a few minutes to complete the written survey that you may receive in the mail after your visit with us. Thank you!             Your Updated Medication List - Protect others around you: Learn how to safely use, store and throw away your medicines at www.disposemymeds.org.          This list is accurate as of 10/31/18 10:38 AM.  Always use your most recent med list.                   Brand Name Dispense Instructions for use Diagnosis    ACE/ARB/ARNI NOT PRESCRIBED (INTENTIONAL)      1 each by Other route continuous prn.    Type 1 diabetes, HbA1c goal < 8% (H), Hyperlipidemia LDL goal < 130       aspirin 81 MG tablet     100    ONE DAILY        BASAGLAR 100 UNIT/ML injection     15 mL    Inject 22-24 Units Subcutaneous every morning    Type 1 diabetes mellitus with stable proliferative retinopathy, unspecified laterality (H)       * blood glucose monitoring test strip    EDWIN CONTOUR    400 strip    1 strip by In Vitro route 4 times daily (before meals and nightly) Checks 4-5 x daily    Type 1 diabetes, HbA1c goal < 8% (H)       * blood  glucose monitoring test strip    no brand specified    400 strip    Use to test blood sugar 4 times daily or as directed, Accu-check Guide meter test strips    Type 1 diabetes mellitus with proliferative retinopathy, unspecified laterality, unspecified proliferative retinopathy type       * blood glucose monitoring test strip    no brand specified    400 strip    Use to test blood sugar 4 times daily or as directed, Accu-check Guide test strips, E10.9.    Type 1 diabetes mellitus with proliferative retinopathy, unspecified laterality, unspecified proliferative retinopathy type       brimonidine 0.2 % ophthalmic solution    ALPHAGAN     INT 1 GTT IN OU BID        cycloSPORINE 0.05 % ophthalmic emulsion    RESTASIS     Place 1 drop into both eyes 2 times daily        diclofenac 75 MG EC tablet    VOLTAREN     TK 1 T PO BID        DICLOFENAC PO      Take by mouth 2 times daily        dorzolamide-timolol 2-0.5 % ophthalmic solution    COSOPT     INSTILL 1 GTT IN OU QAM        FISH OIL TRIPLE STRENGTH 1400 MG Caps      Take 2 tablets by mouth daily.        FOLIC ACID PO      Take 1 mg by mouth daily        insulin aspart 100 UNIT/ML injection    insulin aspart    15 mL    Inject 3-8 units premeal, as directed    Type 1 diabetes mellitus with proliferative retinopathy, unspecified laterality, unspecified proliferative retinopathy type       insulin pen needle 31G X 5 MM    B-D U/F    1 Box    3 Box See Admin Instructions. Use 5 x daily or as directed.    Type 1 diabetes, HbA1c goal < 8% (H)       METHOTREXATE PO      Take by mouth once a week 7 tablets once a week        ONE DAILY MULTIPLE VITAMIN Tabs      one daily        prednisoLONE acetate 1 % ophthalmic susp    PRED FORTE     Place 1 drop into both eyes daily        predniSONE 5 MG tablet    DELTASONE     TK 1 T PO QAM        REMICADE IV      Inject 300 mg into the vein Every 2 months        simvastatin 40 MG tablet    ZOCOR    30 tablet    TAKE 1 TABLET BY MOUTH AT  BEDTIME TO LOWER CHOLESTEROL    Hyperlipidemia LDL goal <100       sulfaSALAzine 500 MG tablet    AZULFIDINE     Take 500 mg by mouth 2 times daily 2 tablets twice daily        * TIMOLOL MALEATE OP      Apply  to eye daily. 1 drop in each eye.        * timolol 0.5 % ophthalmic solution    TIMOPTIC          VALTREX 500 MG tablet   Generic drug:  valACYclovir     14 tablet    Take 1 tablet by mouth daily.    Mixed hyperlipidemia, Routine physical examination       MALKA 28 3-0.03 MG per tablet   Generic drug:  drospirenone-ethinyl estradiol     3 months     1 TABLET DAILY    Type I (juvenile type) diabetes mellitus without mention of complication, not stated as uncontrolled, Mixed hyperlipidemia       * Notice:  This list has 5 medication(s) that are the same as other medications prescribed for you. Read the directions carefully, and ask your doctor or other care provider to review them with you.

## 2018-10-31 NOTE — PROGRESS NOTES

## 2018-10-31 NOTE — PROGRESS NOTES
SUBJECTIVE:   CC: Perlita Chavez is an 47 year old woman who presents for preventive health visit.     Physical   Annual:     Getting at least 3 servings of Calcium per day:  Yes    Bi-annual eye exam:  Yes    Dental care twice a year:  Yes    Sleep apnea or symptoms of sleep apnea:  None    Diet:  Regular (no restrictions)    Frequency of exercise:  2-3 days/week    Duration of exercise:  30-45 minutes    Taking medications regularly:  Yes    Medication side effects:  Not applicable    Additional concerns today:  No       Today's PHQ-2 Score:   PHQ-2 ( 1999 [employer]) 10/31/2018   Q1: Little interest or pleasure in doing things 0   Q2: Feeling down, depressed or hopeless 1   PHQ-2 Score 1   Q1: Little interest or pleasure in doing things Not at all   Q2: Feeling down, depressed or hopeless Several days   PHQ-2 Score 1       Abuse: Current or Past(Physical, Sexual or Emotional)- No  Do you feel safe in your environment - Yes      Past Medical History:   Diagnosis Date     Background diabetic retinopathy(362.01)     sees retina doc q3 mo. Ophtalmo=Dr Solomon Davison ( [phone]) at [facility], Retina Specialist = Dr James Souza ( [phone])     Hyperlipidemia LDL goal < 130      NONSPECIFIC MEDICAL HISTORY     photosensitivity reaction on lisinopril 3/08     RA (rheumatoid arthritis) (H)     seeing rheumatologist.     Sjoegren syndrome (H)     seeing rheumatologist.     Type 1 diabetes, HbA1c goal < 8% (H) age 3    + retinopathy,seeing endocrinologist   since 08/14       Past Surgical History:   Procedure Laterality Date     C NONSPECIFIC PROCEDURE  8/00/97    T & A     C NONSPECIFIC PROCEDURE  5/00/99    L eye surgery     C NONSPECIFIC PROCEDURE  4/10/01    R cataract + IOL     C NONSPECIFIC PROCEDURE  2002    bilat vitrectomy     C NONSPECIFIC PROCEDURE  7/04    R eye vitrectomy     C NONSPECIFIC PROCEDURE  5/08    R vitrectomy & partial retinal detachment        Current Outpatient Prescriptions    Medication Sig Dispense Refill     ACE/ARB NOT PRESCRIBED, INTENTIONAL, 1 each by Other route continuous prn.       ASPIRIN 81 MG OR TABS ONE DAILY 100 3     BASAGLAR 100 UNIT/ML injection Inject 22-24 Units Subcutaneous every morning 15 mL 11     blood glucose (EDWIN CONTOUR) test strip 1 strip by In Vitro route 4 times daily (before meals and nightly) Checks 4-5 x daily 400 strip 3     blood glucose monitoring (NO BRAND SPECIFIED) test strip Use to test blood sugar 4 times daily or as directed, Accu-check Guide test strips, E10.9. 400 strip 3     blood glucose monitoring (NO BRAND SPECIFIED) test strip Use to test blood sugar 4 times daily or as directed, Accu-check Guide meter test strips 400 strip 3     cycloSPORINE (RESTASIS) 0.05 % ophthalmic emulsion Place 1 drop into both eyes 2 times daily       diclofenac (VOLTAREN) 75 MG EC tablet TK 1 T PO BID  2     DICLOFENAC PO Take by mouth 2 times daily       FOLIC ACID PO Take 1 mg by mouth daily       InFLIXimab (REMICADE IV) Inject 300 mg into the vein Every 2 months       insulin aspart (FIASP FLEXTOUCH) 100 UNIT/ML injection Inject 3-8 units premeal, as directed 15 mL 11     Insulin Pen Needle (B-D U/F PEN NEEDLE) needle 3 Box See Admin Instructions. Use 5 x daily or as directed. 1 Box prn     Methotrexate Sodium (METHOTREXATE PO) Take by mouth once a week 7 tablets once a week       Omega-3 Fatty Acids (FISH OIL TRIPLE STRENGTH) 1400 MG CAPS Take 2 tablets by mouth daily.       ONE DAILY MULTIPLE VITAMIN OR TABS one daily  0     prednisoLONE acetate (PRED FORTE) 1 % ophthalmic suspension Place 1 drop into both eyes daily       predniSONE (DELTASONE) 5 MG tablet TK 1 T PO QAM  0     simvastatin (ZOCOR) 40 MG tablet TAKE 1 TABLET BY MOUTH AT BEDTIME TO LOWER CHOLESTEROL 30 tablet 0     sulfaSALAzine (AZULFIDINE) 500 MG tablet Take 500 mg by mouth 2 times daily 2 tablets twice daily       timolol (TIMOPTIC) 0.5 % ophthalmic solution   6     TIMOLOL MALEATE OP  Apply  to eye daily. 1 drop in each eye.        ValACYclovir (VALTREX) 500 MG tablet Take 1 tablet by mouth daily. 14 tablet prn     MALKA 28 3-0.03 MG OR TABS 1 TABLET DAILY 3 months prn     brimonidine (ALPHAGAN) 0.2 % ophthalmic solution INT 1 GTT IN OU BID  3     dorzolamide-timolol (COSOPT) 2-0.5 % ophthalmic solution INSTILL 1 GTT IN OU QAM  6       Family History   Problem Relation Age of Onset     Arthritis Mother      on celebrex,alive & healthy 2002     Arthritis Father      on celebrex(2002)     HEART DISEASE Father      heart attack 1990, 60 years old (2002)     Diabetes Father      Cerebrovascular Disease Father      age 70     Thyroid Disease Sister      on thyroid med for couple of years , 35 yrs now(2002)     Cancer Paternal Grandmother      STOMACH     Cerebrovascular Disease Paternal Grandfather      age 90       Social History   Substance Use Topics     Smoking status: Never Smoker     Smokeless tobacco: Never Used     Alcohol use Yes      Comment: 1 drink every 3 weeks     Alcohol Use 10/31/2018   If you drink alcohol do you typically have greater than 3 drinks per day OR greater than 7 drinks per week? No   No flowsheet data found.    Reviewed orders with patient.  Reviewed health maintenance and updated orders accordingly - Yes     Pertinent mammograms are reviewed under the imaging tab.  History of abnormal Pap smear: NO - age 30- 65 PAP every 3 years recommended     Reviewed and updated as needed this visit by clinical staff  Tobacco  Allergies  Meds  Med Hx  Surg Hx  Fam Hx  Soc Hx        Reviewed and updated as needed this visit by Provider            Review of Systems   Constitutional: Negative for chills and fever.   HENT: Negative for congestion, ear pain, hearing loss and sore throat.    Eyes: Negative for pain and visual disturbance.   Respiratory: Negative for cough and shortness of breath.    Cardiovascular: Negative for chest pain, palpitations and peripheral edema.  "  Gastrointestinal: Negative for abdominal pain, constipation, diarrhea, heartburn, hematochezia and nausea.   Breasts:  Negative for tenderness, breast mass and discharge.   Genitourinary: Negative for dysuria, frequency, genital sores, hematuria, pelvic pain, urgency, vaginal bleeding and vaginal discharge.   Musculoskeletal: Negative for arthralgias, joint swelling and myalgias.   Skin: Negative for rash.   Neurological: Negative for dizziness, weakness, headaches and paresthesias.   Psychiatric/Behavioral: Negative for mood changes. The patient is not nervous/anxious.          OBJECTIVE:   /74  Pulse 104  Temp 98.3  F (36.8  C) (Oral)  Resp 17  Ht 5' 3.5\" (1.613 m)  Wt 123 lb 8 oz (56 kg)  SpO2 94%  BMI 21.53 kg/m2  Physical Exam  GENERAL: healthy, alert and no distress  EYES: Eyes grossly normal to inspection, PERRL and conjunctivae and sclerae normal  HENT: ear canals and TM's normal, nose and mouth without ulcers or lesions  NECK: no adenopathy, no asymmetry, masses, or scars and thyroid normal to palpation  RESP: lungs clear to auscultation - no rales, rhonchi or wheezes  BREAST:about 2 cm limp felt at 12 clock position lt breast superior to nipple. No other masses ana breasts, No tenderness or nipple discharge and no palpable axillary masses or adenopathy  CV: regular rate and rhythm, normal S1 S2, no S3 or S4, no murmur, click or rub, no peripheral edema and peripheral pulses strong  ABDOMEN: soft, nontender, no hepatosplenomegaly, no masses and bowel sounds normal  MS: no gross musculoskeletal defects noted, no edema  NEURO: Normal strength and tone, mentation intact and speech normal  PSYCH: mentation appears normal, affect normal/bright      ASSESSMENT/PLAN:       (Z00.00) Encounter for routine adult health examination without abnormal findings  (primary encounter diagnosis)  Plan: Hemoglobin, Comprehensive metabolic panel,         Lipid panel reflex to direct LDL Fasting, TSH         with " "free T4 reflex      (E10.39) 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         COUNSELING:  Reviewed preventive health counseling, as reflected in patient instructions       Regular exercise       Healthy diet/nutrition       Immunizations    Vaccinated for: Influenza and Pneumococcal          BP Readings from Last 1 Encounters:   10/31/18 116/74     Estimated body mass index is 21.53 kg/(m^2) as calculated from the following:    Height as of this encounter: 5' 3.5\" (1.613 m).    Weight as of this encounter: 123 lb 8 oz (56 kg).           reports that she has never smoked. She has never used smokeless tobacco.      Counseling Resources:  ATP IV Guidelines  Pooled Cohorts Equation Calculator  Breast Cancer Risk Calculator  FRAX Risk Assessment  ICSI Preventive Guidelines  Dietary Guidelines for Americans, 2010  [employer]'s MyPlate  ASA Prophylaxis  Lung CA Screening    Calos Melendez MD  [facility]  Answers for HPI/ROS submitted by the patient on 10/31/2018   PHQ-2 Score: 1    "

## 2018-10-31 NOTE — NURSING NOTE
"/74  Pulse 104  Temp 98.3  F (36.8  C) (Oral)  Resp 17  Ht 5' 3.5\" (1.613 m)  Wt 123 lb 8 oz (56 kg)  SpO2 94%  BMI 21.53 kg/m2  Patient in for annual female physical.  Sadia Varma, INDU    "

## 2018-11-01 LAB
ALBUMIN SERPL-MCNC: 3.6 G/DL (ref 3.4–5)
ALP SERPL-CCNC: 47 U/L (ref 40–150)
ALT SERPL W P-5'-P-CCNC: 23 U/L (ref 0–50)
ANION GAP SERPL CALCULATED.3IONS-SCNC: 9 MMOL/L (ref 3–14)
AST SERPL W P-5'-P-CCNC: 19 U/L (ref 0–45)
BILIRUB SERPL-MCNC: 0.8 MG/DL (ref 0.2–1.3)
BUN SERPL-MCNC: 21 MG/DL (ref 7–30)
CALCIUM SERPL-MCNC: 8.9 MG/DL (ref 8.5–10.1)
CHLORIDE SERPL-SCNC: 104 MMOL/L (ref 94–109)
CHOLEST SERPL-MCNC: 143 MG/DL
CO2 SERPL-SCNC: 26 MMOL/L (ref 20–32)
CREAT SERPL-MCNC: 0.86 MG/DL (ref 0.52–1.04)
GFR SERPL CREATININE-BSD FRML MDRD: 70 ML/MIN/1.7M2
GLUCOSE SERPL-MCNC: 185 MG/DL (ref 70–99)
HDLC SERPL-MCNC: 61 MG/DL
LDLC SERPL CALC-MCNC: 45 MG/DL
NONHDLC SERPL-MCNC: 82 MG/DL
POTASSIUM SERPL-SCNC: 4.3 MMOL/L (ref 3.4–5.3)
PROT SERPL-MCNC: 8 G/DL (ref 6.8–8.8)
SODIUM SERPL-SCNC: 139 MMOL/L (ref 133–144)
TRIGL SERPL-MCNC: 185 MG/DL
TSH SERPL DL<=0.005 MIU/L-ACNC: 3.4 MU/L (ref 0.4–4)

## 2018-11-07 ENCOUNTER — HOSPITAL ENCOUNTER (OUTPATIENT)
Dept: ULTRASOUND IMAGING | Facility: CLINIC | Age: 48
End: 2018-11-07
Attending: INTERNAL MEDICINE
Payer: COMMERCIAL

## 2018-11-07 ENCOUNTER — HOSPITAL ENCOUNTER (OUTPATIENT)
Dept: MAMMOGRAPHY | Facility: CLINIC | Age: 48
Discharge: HOME | End: 2018-11-07
Attending: INTERNAL MEDICINE | Admitting: INTERNAL MEDICINE
Payer: COMMERCIAL

## 2018-11-07 DIAGNOSIS — N63.0 LUMP OR MASS IN BREAST: ICD-10-CM

## 2018-11-07 PROCEDURE — 76642 ULTRASOUND BREAST LIMITED: CPT | Mod: LT

## 2018-11-07 PROCEDURE — G0279 TOMOSYNTHESIS, MAMMO: HCPCS

## 2018-11-14 ENCOUNTER — HOSPITAL ENCOUNTER (OUTPATIENT)
Dept: MAMMOGRAPHY | Facility: CLINIC | Age: 48
End: 2018-11-14
Attending: INTERNAL MEDICINE
Payer: COMMERCIAL

## 2018-11-14 ENCOUNTER — HOSPITAL ENCOUNTER (OUTPATIENT)
Dept: ULTRASOUND IMAGING | Facility: CLINIC | Age: 48
Discharge: HOME | End: 2018-11-14
Attending: INTERNAL MEDICINE | Admitting: INTERNAL MEDICINE
Payer: COMMERCIAL

## 2018-11-14 DIAGNOSIS — N63.0 LUMP OR MASS IN BREAST: ICD-10-CM

## 2018-11-14 PROCEDURE — 40000986 MA POST PROCEDURE LEFT

## 2018-11-14 PROCEDURE — 88305 TISSUE EXAM BY PATHOLOGIST: CPT | Mod: 26 | Performed by: INTERNAL MEDICINE

## 2018-11-14 PROCEDURE — 25000125 ZZHC RX 250: Performed by: RADIOLOGY

## 2018-11-14 PROCEDURE — 88305 TISSUE EXAM BY PATHOLOGIST: CPT | Performed by: INTERNAL MEDICINE

## 2018-11-14 PROCEDURE — 27210206 US BREAST BIOPSY CORE NEEDLE LEFT

## 2018-11-14 RX ADMIN — LIDOCAINE HYDROCHLORIDE 5 ML: 10 INJECTION, SOLUTION EPIDURAL; INFILTRATION; INTRACAUDAL; PERINEURAL at 13:11

## 2018-11-14 NOTE — DISCHARGE INSTRUCTIONS
Page 1 of 1  For informational purposes only. Not to replace the advice of your health care provider. Copyright   2010 [facility]. All rights reserved. [email or web address] [id number] - REV 02/16.  After Your Breast Biopsy   Bleeding or bruising  Slight bruising is normal. If you bleed through the bandage, put direct pressure on the breast for 10 minutes.   If the breast begins to swell, or you have a lot of bleeding after 10 minutes of pressure, call the doctor who ordered your exam. Or, go to the emergency room.   Bandages  Keep your bandage in place until tomorrow morning. Do not get it wet.   If you have small pieces of tape on the skin, leave them in place. They will fall off on their own, or you can remove them after 5 days.   Activity  You may shower the morning after the exam. No heavy activity (lifting, vacuuming) on the day of your exam. You may go back to normal activity the next day, unless you had a lot of bleeding or pain.  Discomfort  You may take Tylenol (acetaminophen) today for pain. Tomorrow, you may take an anti-inflammatory medicine (aspirin, ibuprofen, Motrin, Aleve, Advil), unless your doctor tells you not to.  Wear your bra overnight to support the breast. You may also use an ice pack: Place it over the area for 15-20 minutes several times a day.  Infection  Infection is rare. Symptoms include fever, redness, increasing pain and fluid draining from the biopsy site. If you have any of these symptoms, please call the doctor who ordered your exam.  Results  Results may take up to 5 business days. A nurse or doctor from the Breast Center will call with your results. We will also send the results to the doctor who ordered your biopsy.  If you have not heard your results in 5 days, please call the Breast Center.   Other instructions  ______________________________________________________________________________________________________________________________  Call your doctor if:    You have  bleeding that lasts more than 10 minutes.    You have pain that cannot be controlled.   You have signs of infection (fever, redness, drainage or other signs).   You have not received your results within 5 days.    Please call the Breast Center nurse navigator at [phone] if you have questions or concerns about your biopsy.

## 2018-11-15 DIAGNOSIS — E78.5 HYPERLIPIDEMIA LDL GOAL <100: ICD-10-CM

## 2018-11-15 LAB — COPATH REPORT: NORMAL

## 2018-11-15 NOTE — PROGRESS NOTES
After review by Breast Center Radiologist, Dr. Mukesh Car, Ms. Chavez was called and  given her 11/14/2018 Left Breast Biopsy pathology results (Dense tissue, Possibly diabetic Mastopathy) and Follow up Recommendations (Annual Screening Mammogram).  Perlita denies any post biopsy site issues. I encouraged her to perform monthly breast self exams and to notify her doctor if any breast changes or concerns arise.    Princess Tijerina RN, BSN  [facility]  [phone]

## 2018-11-15 NOTE — TELEPHONE ENCOUNTER
"Requested Prescriptions   Pending Prescriptions Disp Refills     simvastatin (ZOCOR) 40 MG tablet [Pharmacy Med Name: SIMVASTATIN 40MG TABLETS] 30 tablet 0    Last Written Prescription Date:  10/22/2018  Last Fill Quantity: 30,  # refills: 0   Last office visit: 10/31/2018 with prescribing provider:     Future Office Visit:   Sig: TAKE 1 TABLET BY MOUTH AT BEDTIME TO LOWER CHOLESTEROL    Statins Protocol Passed    11/15/2018  9:44 AM       Passed - LDL on file in past 12 months    Recent Labs   Lab Test  10/31/18   1025   LDL  45            Passed - No abnormal creatine kinase in past 12 months    No lab results found.            Passed - Recent (12 mo) or future (30 days) visit within the authorizing provider's specialty    Patient had office visit in the last 12 months or has a visit in the next 30 days with authorizing provider or within the authorizing provider's specialty.  See \"Patient Info\" tab in inbasket, or \"Choose Columns\" in Meds & Orders section of the refill encounter.             Passed - Patient is age 18 or older       Passed - No active pregnancy on record       Passed - No positive pregnancy test in past 12 months        "

## 2018-11-19 RX ORDER — SIMVASTATIN 40 MG
TABLET ORAL
Qty: 90 TABLET | Refills: 2 | Status: SHIPPED | OUTPATIENT
Start: 2018-11-19 | End: 2019-08-11

## 2018-11-19 NOTE — TELEPHONE ENCOUNTER
Prescription approved per [facility] Refill Protocol. LY Flores R.N.

## 2018-12-26 ENCOUNTER — TRANSFERRED RECORDS (OUTPATIENT)
Dept: HEALTH INFORMATION MANAGEMENT | Facility: CLINIC | Age: 48
End: 2018-12-26

## 2019-01-25 ENCOUNTER — TRANSFERRED RECORDS (OUTPATIENT)
Dept: HEALTH INFORMATION MANAGEMENT | Facility: CLINIC | Age: 49
End: 2019-01-25

## 2019-03-19 ENCOUNTER — MYC MEDICAL ADVICE (OUTPATIENT)
Dept: ENDOCRINOLOGY | Facility: CLINIC | Age: 49
End: 2019-03-19

## 2019-03-19 ENCOUNTER — TELEPHONE (OUTPATIENT)
Dept: ENDOCRINOLOGY | Facility: CLINIC | Age: 49
End: 2019-03-19

## 2019-03-19 DIAGNOSIS — E10.3599 TYPE 1 DIABETES MELLITUS WITH PROLIFERATIVE RETINOPATHY, MACULAR EDEMA PRESENCE UNSPECIFIED, UNSPECIFIED LATERALITY, UNSPECIFIED PROLIFERATIVE RETINOPATHY TYPE (H): Primary | ICD-10-CM

## 2019-03-19 NOTE — TELEPHONE ENCOUNTER
This has been resolved. Dr. Lowe signed for new RX at 1630 and patient was notified- see alternative encounter.       Esperanza MANNING RN

## 2019-03-19 NOTE — TELEPHONE ENCOUNTER
Reason for Call:  Medication or medication refill:    Do you use a [facility]?  Name of the pharmacy and phone number for the current request:    [facility] [address] [address] AT [facility] [address]      Name of the medication requested:   Pt requesting ACCU-CHEK GUIDE monitor        Other request:   Pt's monitor has broken.  She is very nervous!  She needs new machine as soon as possible.  She booked an appt with MD Lingo. Soonest available and booked was 06/24/2019 at 930.  Please expedite.      Can we leave a detailed message on this number? YES    Phone number patient can be reached at: Home number on file [phone].      Best Time: Any     Call taken on 3/19/2019 at 4:11 PM by Katherine Cassidy

## 2019-03-19 NOTE — TELEPHONE ENCOUNTER
Spoke with patient:     She was very grateful that Dr. Lowe signed RX at 1630 today (per MAR).    Esperanza MANNING RN

## 2019-03-20 ENCOUNTER — TELEPHONE (OUTPATIENT)
Dept: ENDOCRINOLOGY | Facility: CLINIC | Age: 49
End: 2019-03-20

## 2019-03-29 ENCOUNTER — MYC MEDICAL ADVICE (OUTPATIENT)
Dept: ENDOCRINOLOGY | Facility: CLINIC | Age: 49
End: 2019-03-29

## 2019-03-29 DIAGNOSIS — E10.3599 TYPE 1 DIABETES MELLITUS WITH PROLIFERATIVE RETINOPATHY, MACULAR EDEMA PRESENCE UNSPECIFIED, UNSPECIFIED LATERALITY, UNSPECIFIED PROLIFERATIVE RETINOPATHY TYPE (H): Primary | ICD-10-CM

## 2019-03-29 DIAGNOSIS — E10.3599: ICD-10-CM

## 2019-03-29 NOTE — TELEPHONE ENCOUNTER
insulin aspart    Last Written Prescription Date:  03/16/2018  Last Fill Quantity: 15 mL,  # refills: 11   Last office visit: No previous visit found with prescribing provider:     Future Office Visit:   Next 5 appointments (look out 90 days)    Apr 02, 2019  3:00 PM CDT  [facility] [address]  [facility] [place] [address]  [phone]   Jun 24, 2019  9:30 AM CDT  [facility] [address]  [place] [address]  [phone]         Requested Prescriptions   Pending Prescriptions Disp Refills     insulin aspart (FIASP FLEXTOUCH) 100 UNIT/ML pen-injector [Pharmacy Med Name: FIASP FLEXTOUCH PEN INJ 3ML]  0     Sig: INJECT 3 TO 8 UNITS PER MEAL AS DIRECTED    Short Acting Insulin Protocol Passed - 3/29/2019  1:18 PM       Passed - Blood pressure less than 140/90 in past 6 months    BP Readings from Last 3 Encounters:   10/31/18 116/74   06/19/18 115/70   03/16/18 136/81                Passed - LDL on file in past 12 months    Recent Labs   Lab Test 10/31/18  1025   LDL 45            Passed - Microalbumin on file in past 12 months    Recent Labs   Lab Test 03/16/18  1627   MICROL 20   UMALCR 9.44            Passed - Serum creatinine on file in past 12 months    Recent Labs   Lab Test 10/31/18  1025   CR 0.86            Passed - HgbA1C in past 3 or 6 months    If HgbA1C is 8 or greater, it needs to be on file within the past 3 months.  If less than 8, must be on file within the past 6 months.     Recent Labs   Lab Test 10/31/18  1025   A1C 7.7*            Passed - Medication is active on med list       Passed - Patient is age 18 or older       Passed - Recent (6 mo) or future (30 days) visit within the authorizing provider's specialty    Patient had office visit in the last 6 months or has a visit in the next 30 days with authorizing  "provider or within the authorizing provider's specialty.  See \"Patient Info\" tab in inbasket, or \"Choose Columns\" in Meds & Orders section of the refill encounter.              "

## 2019-03-29 NOTE — TELEPHONE ENCOUNTER
Dr. Al see below     Pt currently taking Fiasp (insulin aspart) 3-8 units pre-meal     Will route to CS Prior auth, but is there an alternative med pt can take in the interim?     Please advise     Hilaria COTTO RN

## 2019-03-30 NOTE — TELEPHONE ENCOUNTER
Message noted, called patient, discussed plan to change from the Fiasp to the Novolog Flexpens, with same mealtime dosing.  This new Novolog pen Rx sent to her [facility] pharmacy.    We will also send a PA request for the Fiasp insulin.  This insulin has a more rapid onset of action when compared to aspart (Novolog) insulin. In clinical studies, Fiasp was more effective improving dtcxesqzmaD0j levels (reference:  Gael et al, Diabetes Care 2017, Mar, [id number]).  She has used the Fiasp insulin and it has worked well for her glucose control. Thanks.    THOM Lowe MD  Endocrinology   [facility]

## 2019-04-01 ENCOUNTER — TELEPHONE (OUTPATIENT)
Dept: ENDOCRINOLOGY | Facility: CLINIC | Age: 49
End: 2019-04-01

## 2019-04-01 RX ORDER — INSULIN ASPART INJECTION 100 [IU]/ML
INJECTION, SOLUTION SUBCUTANEOUS
Qty: 15 ML | Refills: 0 | Status: SHIPPED | OUTPATIENT
Start: 2019-04-01 | End: 2019-06-13

## 2019-04-01 NOTE — TELEPHONE ENCOUNTER
Prior Authorization Retail Medication Request    Medication/Dose: Insulin aspart (Fiasp Flextouch) 100 unit/mL  ICD code (if different than what is on RX):  E10.3599  Previously Tried and Failed:  Novolog, Humalog  Rationale:  This insulin has a more rapid onset of action when compared to aspart (Novolog) insulin. In clinical studies, Fiasp was more effective improving myjitnbgcaX3r levels (reference:  Gael et al, Diabetes Care 2017, Mar, [id number]).  She has used the Fiasp insulin and it has worked well for her glucose control.    Insurance Name:  [facility]  Insurance ID:  [id number]      Pharmacy Information (if different than what is on RX)  Name:  [id number]  Phone:  [phone]

## 2019-04-01 NOTE — TELEPHONE ENCOUNTER
Prescription approved per [facility] [place]ill [place].  Lindsay VERA RN

## 2019-04-01 NOTE — LETTER
2019      Perlita Chavez  [address] [facility] [address]        To Whom It May Concern,     I have previously seen Ms Perlita Chavez ( 70) for evaluation of Type 1 diabetes mellitus.  She has used Fiasp insulin and also Novolog insulin for treatment (along with the Basaglar long-acting insulin) previously, but has been told these medications are now non-formulary.      The Fiasp insulin Prior Authorization appeal reply dated 19 indicated patient must have tried and failed three drugs covered by plan for the condition.  The letter also states that covered drugs are Admelog Solostar pen or vial, Novolog 70/30, and Novolin R U100.    Ms Chavez has now tried Admelog Solostar pens and has had more variable glucose levels with this insulin.  The Novolog 70/30 and Novolin R insulins are not similar insulins and should not be used for her Type 1 diabetes management.    I request another appeal for her to use the Fiasp FlexTouch insulin pens.  They are medically necessary and there are no other formulary insulins that are equivalent for substitution.    Sincerely,        Saurabh Lowe MD

## 2019-04-02 ENCOUNTER — OFFICE VISIT (OUTPATIENT)
Dept: ENDOCRINOLOGY | Facility: CLINIC | Age: 49
End: 2019-04-02
Payer: COMMERCIAL

## 2019-04-02 VITALS
WEIGHT: 121.4 LBS | HEIGHT: 64 IN | SYSTOLIC BLOOD PRESSURE: 153 MMHG | BODY MASS INDEX: 20.73 KG/M2 | HEART RATE: 90 BPM | DIASTOLIC BLOOD PRESSURE: 82 MMHG

## 2019-04-02 DIAGNOSIS — E10.3599 TYPE 1 DIABETES MELLITUS WITH PROLIFERATIVE RETINOPATHY, MACULAR EDEMA PRESENCE UNSPECIFIED, UNSPECIFIED LATERALITY, UNSPECIFIED PROLIFERATIVE RETINOPATHY TYPE (H): Primary | ICD-10-CM

## 2019-04-02 DIAGNOSIS — J40 BRONCHITIS: ICD-10-CM

## 2019-04-02 PROCEDURE — 99214 OFFICE O/P EST MOD 30 MIN: CPT | Performed by: INTERNAL MEDICINE

## 2019-04-02 RX ORDER — INSULIN LISPRO 100 U/ML
INJECTION, SOLUTION SUBCUTANEOUS
Qty: 15 ML | Refills: 11 | Status: SHIPPED | OUTPATIENT
Start: 2019-04-02 | End: 2019-05-03

## 2019-04-02 RX ORDER — AZITHROMYCIN 250 MG/1
TABLET, FILM COATED ORAL
Qty: 6 TABLET | Refills: 0 | Status: SHIPPED | OUTPATIENT
Start: 2019-04-02 | End: 2019-09-17

## 2019-04-02 ASSESSMENT — MIFFLIN-ST. JEOR: SCORE: 1157.73

## 2019-04-02 NOTE — PROGRESS NOTES
Name: Perlita Chavez      HPI:  Recent issues:  Here for f/u DM evaluation  Recent symptoms with throat phlegm past 4 weeks, coughing, and fatigue; denies fever  Running low with supply of her Novolog insulin, insurance coverage issues also         1972. Diagnosis of diabetes mellitus age 2 1/2, living in [facility]  Initial treatment with NPH and Regular insulin medications  Had seen Dr. Sarah Sierra/[facility]  Switched to MDI insulin plan using Nv and Levemir insulin    8/19/14. Initial consult with me at my former clinic ([facility])  8/20/14. CDE RD evaluation, also tried demo OmniPod pump  Used Novolog Echo 1/2U pen, with I:C carb counting method  Tried Lantus BID dosing, then Tresiba (expensive), then Basaglar   Previously used Novolog insulin  5/2018. Switched to Fiasp 3/2018, then Fiasp non-formulary 2019  Current DM meds:   Basaglar Kwikpen 22U sq QAM   Novolog 1:15 ratio (typically 6-12 per meal)    Nv sscale 1U per 40>150  Using Accucheck Guide BG meter   Tests 3-4x/day  Exercises with gym workouts, less often  Previous [facility] labs include:  Lab Results   Component Value Date    A1C 7.7 (H) 10/31/2018     10/31/2018    POTASSIUM 4.3 10/31/2018    CHLORIDE 104 10/31/2018    CO2 26 10/31/2018    ANIONGAP 9 10/31/2018     (H) 10/31/2018    BUN 21 10/31/2018    CR 0.86 10/31/2018    GFRESTIMATED 70 10/31/2018    GFRESTBLACK 85 10/31/2018    FINESSE 8.9 10/31/2018    CHOL 143 10/31/2018    TRIG 185 (H) 10/31/2018    HDL 61 10/31/2018    LDL 45 10/31/2018    NHDL 82 10/31/2018    UCRR 215 03/16/2018    MICROL 20 03/16/2018    UMALCR 9.44 03/16/2018     No history of vascular disease.  Takes simvastatin for hyperlipidemia management.  DM Complications:   Retinopathy    Previous proliferative DR and bilateral laser PC surgeries    Had cataracts, retinal detachment repair OD, higher occular pressures    Sees Fer Davison and EBER Trevizo/ophthalmology    Last eye exam with Dr. Davison 6/2017      Works  at [employer] store in [facility] (Wed-Sunday's?)  Sees Dr. Humberto Melendez/[facility] for general medicine evaluations.  Also sees Dr. Blanco/[facility] rheumatology    PMH/PSH:  Past Medical History:   Diagnosis Date     Background diabetic retinopathy(362.01)     sees retina doc q3 mo. Ophtalmo=Dr Solomon Davison ( [phone]) at [facility], Retina Specialist = Dr James Souza ( [phone])     Hyperlipidemia LDL goal < 130      NONSPECIFIC MEDICAL HISTORY     photosensitivity reaction on lisinopril 3/08     RA (rheumatoid arthritis) (H)     seeing rheumatologist.     Sjoegren syndrome (H)     seeing rheumatologist.     Type 1 diabetes mellitus (H)     retinopathy     Past Surgical History:   Procedure Laterality Date     C NONSPECIFIC PROCEDURE  8/00/97    T & A     C NONSPECIFIC PROCEDURE  5/00/99    L eye surgery     C NONSPECIFIC PROCEDURE  4/10/01    R cataract + IOL     C NONSPECIFIC PROCEDURE  2002    bilat vitrectomy     C NONSPECIFIC PROCEDURE  7/04    R eye vitrectomy     C NONSPECIFIC PROCEDURE  5/08    R vitrectomy & partial retinal detachment        Family Hx:  Family History   Problem Relation Age of Onset     Arthritis Mother         on celebrex,alive & healthy 2002     Arthritis Father         on celebrex(2002)     Heart Disease Father         heart attack 1990, 60 years old (2002)     Diabetes Father      Cerebrovascular Disease Father         age 70     Thyroid Disease Sister         on thyroid med for couple of years , 35 yrs now(2002)     Cancer Paternal Grandmother         STOMACH     Cerebrovascular Disease Paternal Grandfather         age 90         Social Hx:  Social History     Socioeconomic History     Marital status:      Spouse name:      Number of children: 0     Years of education: Not on file     Highest education level: Not on file   Occupational History     Occupation: [employer]     Employer: [employer]   Social Needs     Financial  resource strain: Not on file     Food insecurity:     Worry: Not on file     Inability: Not on file     Transportation needs:     Medical: Not on file     Non-medical: Not on file   Tobacco Use     Smoking status: Never Smoker     Smokeless tobacco: Never Used   Substance and Sexual Activity     Alcohol use: Yes     Comment: 1 drink every 3 weeks     Drug use: No     Sexual activity: Not Currently     Partners: Male     Comment:    Lifestyle     Physical activity:     Days per week: Not on file     Minutes per session: Not on file     Stress: Not on file   Relationships     Social connections:     Talks on phone: Not on file     Gets together: Not on file     Attends Spiritism service: Not on file     Active member of club or organization: Not on file     Attends meetings of clubs or organizations: Not on file     Relationship status: Not on file     Intimate partner violence:     Fear of current or ex partner: Not on file     Emotionally abused: Not on file     Physically abused: Not on file     Forced sexual activity: Not on file   Other Topics Concern     Parent/sibling w/ CABG, MI or angioplasty before 65F 55M? Not Asked   Social History Narrative     Not on file          MEDICATIONS:  has a current medication list which includes the following prescription(s): aspirin, azithromycin, insulin glargine, brimonidine, cyclosporine, diclofenac, diclofenac, dorzolamide-timolol, folic acid, infliximab, insulin lispro, insulin pen needle, methotrexate, fish oil triple strength, one daily multiple vitamin, prednisolone acetate, simvastatin, sulfasalazine, timolol maleate, valacyclovir, maude 28, ace/arb/arni not prescribed, blood glucose, blood glucose monitoring, blood glucose, blood glucose, insulin aspart, insulin aspart, prednisone, and timolol maleate.    ROS:     ROS: 10 point ROS neg other than the symptoms noted above in the HPI.    GENERAL: some fatigue; no weight changes, fevers, chills, night sweats.  "  HEENT: minimal/no vision from right eye, dry eyes/mouth; no dysphagia, odonophagia, diplopia  THYROID:  no apparent hyper or hypothyroid symptoms  CV: no chest pain, pressure, palpitations  LUNGS: no SOB, FUENTES, cough, wheezing   ABDOMEN: rare morning nausea; no diarrhea, constipation, abdominal pain  EXTREMITIES: no rashes, ulcers, edema  NEUROLOGY: decreased sensation at feet; no headaches, denies changes in vision, tingling, extremitiy numbness   MSK: mild hand tendinitis pains; no muscle aches, weakness  SKIN: denies rashes or lesions/foot sores  :  menses regular on Kristi OCP med  PSYCH:  stable mood, no significant anxiety or depression  ENDOCRINE: no heat or cold intolerance    Physical Exam   VS: /82   Pulse 90   Ht 1.613 m (5' 3.5\")   Wt 55.1 kg (121 lb 6.4 oz)   BMI 21.17 kg/m    GENERAL: AXOX3, NAD, well dressed, answering questions appropriately, appears stated age.  THYROID:  normal gland, no apparent nodules or goiter  LUNGS: CTAB, no wheezes, rales, or ronchi  ABDOMEN: soft, nontender, nondistended  EXTREMITIES: no pedal edema  NEUROLOGY: CN grossly intact, no tremors  MSK: grossly intact  SKIN: no rashes, no lesions    LABS:    All pertinent notes, labs, and images personally reviewed by me.     A/P:  Encounter Diagnoses   Name Primary?     Type 1 diabetes mellitus with proliferative retinopathy, macular edema presence unspecified, unspecified laterality, unspecified proliferative retinopathy type (H) Yes     Bronchitis      Comments:  Reviewed the diabetes and health history, in detail.  Recent BG trends mildly elevated, variable.    Plan:  Discussed general issues with the diabetes diagnosis and managemen  We discussed the hgbA1c test which reflects previous overall glucose levels or control  Discussed the importance of blood glucose (BG) testing to assess glucose trends  Discussed use of the Accu-check Guide BG meter and insulin dose calculator (syncs with iPhone), device " settings.  Provided general overview of the multiple daily injection (MDI) plan using rapid acting mealtime and longacting insulin medications    Recommend:  Continue current insulin treatment and diabetes management  Since Fiasp and Novolog insulins apparently not covered by insurance, change to Admelog   Sent new Admelog pen, pen needle Rx to her pharmacy  Lower basal insulin as Basaglar 18U daily  Advised starting Abx for the URI, begin Zpack 5-day course and also try OTC Mucinex  No labs ordered for today  Use the Accu-check Guide settings with smart-phone use.  Symptomatic treatment for the head and ear congestion, no clear signs of bacterial infection  Advise having fasting lipid panel testing at least annually  Keep regular f/u ophthalmology evaluation appointments    Perlita to follow up with Primary Care provider regarding elevated blood pressure.  Addressed patient questions today    Labs ordered today:   No orders of the defined types were placed in this encounter.    Radiology/Consults ordered today: None    More than 50% of the time spent with Ms. Chavez on counseling / coordinating her care.  Total appointment time was 30 minutes.    Follow-up:  1 mo.    Saurabh Lowe MD  Endocrinology  [place] [facility]  Copy:  CHUY Melendez

## 2019-04-05 NOTE — TELEPHONE ENCOUNTER
PA Initiation    Medication: Insulin aspart (Fiasp Flextouch) 100 unit/mL  Insurance Company: "[employer]" - Phone [phone] Fax [phone]  Pharmacy Filling the Rx: [facility] [address] [place], [place] [address] [address] [facility] [address]  Filling Pharmacy Phone: [phone]  Filling Pharmacy Fax:    Start Date: 4/5/2019    Central Prior Authorization Team   Phone: [phone]

## 2019-04-08 NOTE — TELEPHONE ENCOUNTER
PRIOR AUTHORIZATION DENIED    Medication: Insulin aspart (Fiasp Flextouch) 100 unit/mL    Denial Date: 4/5/2019    Denial Rational: Patient must have a history of trial & failure to 3 of the formulary alternative(s) or have a contraindication or intolerance to the formulary alternatives: admelog solostar pen, novolog 70/30 and one of the following: novolin R unit(s)-100, novolin N unit(s)-100 or novolin 70/30        Appeal Information:

## 2019-04-11 NOTE — TELEPHONE ENCOUNTER
Message noted.  Would you mind sending the Fiasp insulin appeal-appeal with the letter I wrote in her chart?  It may be successful, thanks.    THOM Lowe MD  Endocrinology   [facility]

## 2019-04-11 NOTE — TELEPHONE ENCOUNTER
Medication Appeal Initiation    We have initiated an appeal for the requested medication:  Medication: Insulin aspart (Fiasp Flextouch) 100 unit/mL  Appeal Start Date:  4/11/2019  Insurance Company: [employer] - Phone [phone] Fax [phone]  Comments:  Appeal initiated with letter of medical necessity included and faxed to [phone]

## 2019-04-12 NOTE — TELEPHONE ENCOUNTER
MEDICATION APPEAL APPROVED    Medication: Insulin aspart (Fiasp Flextouch) 100 unit/mL  Authorization Effective Date: 2/1/2019  Authorization Expiration Date: 4/12/2020  Approved Dose/Quantity: 15ml  Reference #: [id number] / [id number]   Insurance Company: [employer] - Phone [phone] Fax [phone]  Which Pharmacy is filling the prescription (Not needed for infusion/clinic administered): [facility] [address] [place] [address] [address] [facility] [address]

## 2019-04-25 ENCOUNTER — TRANSFERRED RECORDS (OUTPATIENT)
Dept: HEALTH INFORMATION MANAGEMENT | Facility: CLINIC | Age: 49
End: 2019-04-25

## 2019-05-03 ENCOUNTER — OFFICE VISIT (OUTPATIENT)
Dept: ENDOCRINOLOGY | Facility: CLINIC | Age: 49
End: 2019-05-03
Payer: COMMERCIAL

## 2019-05-03 VITALS
WEIGHT: 124.4 LBS | HEART RATE: 89 BPM | HEIGHT: 64 IN | DIASTOLIC BLOOD PRESSURE: 83 MMHG | BODY MASS INDEX: 21.24 KG/M2 | SYSTOLIC BLOOD PRESSURE: 144 MMHG

## 2019-05-03 DIAGNOSIS — I10 ESSENTIAL HYPERTENSION: ICD-10-CM

## 2019-05-03 DIAGNOSIS — E10.3599 TYPE 1 DIABETES MELLITUS WITH PROLIFERATIVE RETINOPATHY, MACULAR EDEMA PRESENCE UNSPECIFIED, UNSPECIFIED LATERALITY, UNSPECIFIED PROLIFERATIVE RETINOPATHY TYPE (H): Primary | ICD-10-CM

## 2019-05-03 LAB
ANION GAP SERPL CALCULATED.3IONS-SCNC: 9 MMOL/L (ref 3–14)
BUN SERPL-MCNC: 17 MG/DL (ref 7–30)
CALCIUM SERPL-MCNC: 8.8 MG/DL (ref 8.5–10.1)
CHLORIDE SERPL-SCNC: 107 MMOL/L (ref 94–109)
CO2 SERPL-SCNC: 26 MMOL/L (ref 20–32)
CREAT SERPL-MCNC: 0.76 MG/DL (ref 0.52–1.04)
GFR SERPL CREATININE-BSD FRML MDRD: >90 ML/MIN/{1.73_M2}
GLUCOSE SERPL-MCNC: 101 MG/DL (ref 70–99)
HBA1C MFR BLD: 7.6 % (ref 0–5.6)
POTASSIUM SERPL-SCNC: 3.8 MMOL/L (ref 3.4–5.3)
SODIUM SERPL-SCNC: 142 MMOL/L (ref 133–144)
TSH SERPL DL<=0.005 MIU/L-ACNC: 2.98 MU/L (ref 0.4–4)

## 2019-05-03 PROCEDURE — 84443 ASSAY THYROID STIM HORMONE: CPT | Performed by: INTERNAL MEDICINE

## 2019-05-03 PROCEDURE — 99214 OFFICE O/P EST MOD 30 MIN: CPT | Performed by: INTERNAL MEDICINE

## 2019-05-03 PROCEDURE — 36415 COLL VENOUS BLD VENIPUNCTURE: CPT | Performed by: INTERNAL MEDICINE

## 2019-05-03 PROCEDURE — 83036 HEMOGLOBIN GLYCOSYLATED A1C: CPT | Performed by: INTERNAL MEDICINE

## 2019-05-03 PROCEDURE — 80048 BASIC METABOLIC PNL TOTAL CA: CPT | Performed by: INTERNAL MEDICINE

## 2019-05-03 RX ORDER — LOSARTAN POTASSIUM 25 MG/1
25 TABLET ORAL DAILY
Qty: 30 TABLET | Refills: 11 | Status: SHIPPED | OUTPATIENT
Start: 2019-05-03 | End: 2020-04-29

## 2019-05-03 ASSESSMENT — MIFFLIN-ST. JEOR: SCORE: 1171.33

## 2019-05-03 NOTE — PROGRESS NOTES
Name: Perlita Chavez      HPI:  Recent issues:  Here for f/u diabetes evaluation  Feeling much better after her bronchitis illness last month        1972. Diagnosis of diabetes mellitus age 2 1/2, living in [facility]  Initial treatment with NPH and Regular insulin medications  Had seen Dr. Sarah Sierra/[facility]  Switched to MDI insulin plan using Nv and Levemir insulin    8/19/14. Initial consult with me at my former clinic ([facility])  8/20/14. CDE RD evaluation, also tried demo OmniPod pump  Used Novolog Echo 1/2U pen, with I:C carb counting method  Tried Lantus BID dosing, then Tresiba (expensive), then Basaglar   Previously used Novolog insulin  5/2018. Switched to Fiasp 3/2018, then Fiasp non-formulary 2019  Current DM meds:   Basaglar Kwikpen 18U sq QAM   Novolog 1:15 ratio (typically 6-12 per meal)    Nv sscale 1U per 40>150  Using Accucheck Guide BG meter   Tests 3-4x/day  Exercises with gym workouts, less often  Previous  labs include:  Lab Results   Component Value Date    A1C 7.7 (H) 10/31/2018     10/31/2018    POTASSIUM 4.3 10/31/2018    CHLORIDE 104 10/31/2018    CO2 26 10/31/2018    ANIONGAP 9 10/31/2018     (H) 10/31/2018    BUN 21 10/31/2018    CR 0.86 10/31/2018    GFRESTIMATED 70 10/31/2018    GFRESTBLACK 85 10/31/2018    FINESSE 8.9 10/31/2018    CHOL 143 10/31/2018    TRIG 185 (H) 10/31/2018    HDL 61 10/31/2018    LDL 45 10/31/2018    NHDL 82 10/31/2018    UCRR 215 03/16/2018    MICROL 20 03/16/2018    UMALCR 9.44 03/16/2018     No history of vascular disease.  Takes simvastatin for hyperlipidemia management.  DM Complications:   Retinopathy    Previous proliferative DR and bilateral laser PC surgeries    Had cataracts, retinal detachment repair OD, higher occular pressures    Sees Fer Davison and EBER Trevizo/ophthalmology    Last eye exam with Dr. Davison 6/2017      Works at [employer] in [facility] [place] [facility] (Wed-Sunday's?)  Sees Dr. Humberto Melendez/[facility]  for general medicine evaluations.  Also sees Dr. Blanco/[facility] rheumatology    PMH/PSH:  Past Medical History:   Diagnosis Date     Background diabetic retinopathy(362.01)     sees retina doc q3 mo. Ophtalmo=Dr Solomon Davison ( [phone]) at [facility], Retina Specialist = Dr James Souza ( [phone])     Hyperlipidemia LDL goal < 130      NONSPECIFIC MEDICAL HISTORY     photosensitivity reaction on lisinopril 3/08     RA (rheumatoid arthritis) (H)     seeing rheumatologist.     Sjoegren syndrome (H)     seeing rheumatologist.     Type 1 diabetes mellitus (H)     retinopathy     Past Surgical History:   Procedure Laterality Date     C NONSPECIFIC PROCEDURE  8/00/97    T & A     C NONSPECIFIC PROCEDURE  5/00/99    L eye surgery     C NONSPECIFIC PROCEDURE  4/10/01    R cataract + IOL     C NONSPECIFIC PROCEDURE  2002    bilat vitrectomy     C NONSPECIFIC PROCEDURE  7/04    R eye vitrectomy     C NONSPECIFIC PROCEDURE  5/08    R vitrectomy & partial retinal detachment        Family Hx:  Family History   Problem Relation Age of Onset     Arthritis Mother         on celebrex,alive & healthy 2002     Arthritis Father         on celebrex(2002)     Heart Disease Father         heart attack 1990, 60 years old (2002)     Diabetes Father      Cerebrovascular Disease Father         age 70     Thyroid Disease Sister         on thyroid med for couple of years , 35 yrs now(2002)     Cancer Paternal Grandmother         STOMACH     Cerebrovascular Disease Paternal Grandfather         age 90         Social Hx:  Social History     Socioeconomic History     Marital status:      Spouse name:      Number of children: 0     Years of education: Not on file     Highest education level: Not on file   Occupational History     Occupation: [employer]     Employer: [employer]   Social Needs     Financial resource strain: Not on file     Food insecurity:     Worry: Not on file     Inability: Not on file      Transportation needs:     Medical: Not on file     Non-medical: Not on file   Tobacco Use     Smoking status: Never Smoker     Smokeless tobacco: Never Used   Substance and Sexual Activity     Alcohol use: Yes     Comment: 1 drink every 3 weeks     Drug use: No     Sexual activity: Not Currently     Partners: Male     Comment:    Lifestyle     Physical activity:     Days per week: Not on file     Minutes per session: Not on file     Stress: Not on file   Relationships     Social connections:     Talks on phone: Not on file     Gets together: Not on file     Attends Christianity service: Not on file     Active member of club or organization: Not on file     Attends meetings of clubs or organizations: Not on file     Relationship status: Not on file     Intimate partner violence:     Fear of current or ex partner: Not on file     Emotionally abused: Not on file     Physically abused: Not on file     Forced sexual activity: Not on file   Other Topics Concern     Parent/sibling w/ CABG, MI or angioplasty before 65F 55M? Not Asked   Social History Narrative     Not on file          MEDICATIONS:  has a current medication list which includes the following prescription(s): aspirin, insulin glargine, brimonidine, cyclosporine, diclofenac, dorzolamide-timolol, folic acid, infliximab, insulin aspart, insulin pen needle, losartan, methotrexate, fish oil triple strength, one daily multiple vitamin, prednisolone acetate, prednisone, simvastatin, sulfasalazine, timolol maleate, valacyclovir, maude 28, ace/arb/arni not prescribed, azithromycin, blood glucose, blood glucose monitoring, blood glucose, blood glucose, diclofenac, and timolol maleate.    ROS:     ROS: 10 point ROS neg other than the symptoms noted above in the HPI.    GENERAL: some fatigue; no weight changes, fevers, chills, night sweats.   HEENT: minimal/no vision from right eye, dry eyes/mouth; no dysphagia, odonophagia, diplopia  THYROID:  no apparent hyper or  "hypothyroid symptoms  CV: no chest pain, pressure, palpitations  LUNGS: no SOB, FUENTES, cough, wheezing   ABDOMEN: rare morning nausea; no diarrhea, constipation, abdominal pain  EXTREMITIES: no rashes, ulcers, edema  NEUROLOGY: decreased sensation at feet; no headaches, denies changes in vision, tingling, extremitiy numbness   MSK: mild hand tendinitis pains; no muscle aches, weakness  SKIN: denies rashes or lesions/foot sores  :  menses regular on Kristi OCP med  PSYCH:  stable mood, no significant anxiety or depression  ENDOCRINE: no heat or cold intolerance    Physical Exam   VS: /83   Pulse 89   Ht 1.613 m (5' 3.5\")   Wt 56.4 kg (124 lb 6.4 oz)   BMI 21.69 kg/m    GENERAL: AXOX3, NAD, well dressed, answering questions appropriately, appears stated age.  THYROID:  normal gland, no apparent nodules or goiter  LUNGS: CTAB, no wheezes, rales, or ronchi  ABDOMEN: soft, nontender, nondistended  EXTREMITIES: no pedal edema  NEUROLOGY: CN grossly intact, no tremors  MSK: grossly intact  SKIN: no rashes, no lesions    LABS:    All pertinent notes, labs, and images personally reviewed by me.     A/P:  Encounter Diagnoses   Name Primary?     Type 1 diabetes mellitus with proliferative retinopathy, macular edema presence unspecified, unspecified laterality, unspecified proliferative retinopathy type (H) Yes     Essential hypertension      Comments:  Reviewed the diabetes and health history, in detail.  Recent BG trends mildly elevated, variable.    Plan:  Discussed general issues with the diabetes diagnosis and managemen  We discussed the hgbA1c test which reflects previous overall glucose levels or control  Discussed the importance of blood glucose (BG) testing to assess glucose trends  Discussed use of the Accu-check Guide BG meter and insulin dose calculator (syncs with iPhone), device settings.  Provided general overview of the multiple daily injection (MDI) plan using rapid acting mealtime and longacting " insulin medications    Recommend:  Continue current insulin treatment and diabetes management   Continue use of Fiasp mealtime insulin   Lower basal insulin as Basaglar 17U daily  Start using the iPhone merry for the Accu-check Guide insulin dose calculator   We reviewed this feature today and I added the HCP code number to her phone merry   Contact our office if questions/problems  Add losartan 25 mg daily dose, new Rx sent to pharmacy  Check labs today  Consider enrolling with the [facility] patient support program, discussed  Advise having fasting lipid panel testing at least annually  Keep regular f/u ophthalmology evaluation appointments    Addressed patient questions today    Labs ordered today:   Orders Placed This Encounter   Procedures     Hemoglobin A1c     TSH with free T4 reflex     Basic metabolic panel     Radiology/Consults ordered today: None    More than 50% of the time spent with Ms. Chavez on counseling / coordinating her care.  Total appointment time was 30 minutes.    Follow-up:  6 weeks.    Saurabh Lowe MD  Endocrinology  [place] [facility]

## 2019-05-17 ENCOUNTER — TRANSFERRED RECORDS (OUTPATIENT)
Dept: HEALTH INFORMATION MANAGEMENT | Facility: CLINIC | Age: 49
End: 2019-05-17

## 2019-06-24 ENCOUNTER — OFFICE VISIT (OUTPATIENT)
Dept: ENDOCRINOLOGY | Facility: CLINIC | Age: 49
End: 2019-06-24
Payer: COMMERCIAL

## 2019-06-24 VITALS
DIASTOLIC BLOOD PRESSURE: 64 MMHG | BODY MASS INDEX: 21.48 KG/M2 | HEIGHT: 64 IN | HEART RATE: 86 BPM | WEIGHT: 125.8 LBS | SYSTOLIC BLOOD PRESSURE: 118 MMHG

## 2019-06-24 DIAGNOSIS — E10.3559 TYPE 1 DIABETES MELLITUS WITH STABLE PROLIFERATIVE RETINOPATHY, UNSPECIFIED LATERALITY (H): Primary | ICD-10-CM

## 2019-06-24 LAB
CREAT UR-MCNC: 213 MG/DL
MICROALBUMIN UR-MCNC: 16 MG/L
MICROALBUMIN/CREAT UR: 7.56 MG/G CR (ref 0–25)

## 2019-06-24 PROCEDURE — 99214 OFFICE O/P EST MOD 30 MIN: CPT | Performed by: INTERNAL MEDICINE

## 2019-06-24 PROCEDURE — 82043 UR ALBUMIN QUANTITATIVE: CPT | Performed by: INTERNAL MEDICINE

## 2019-06-24 ASSESSMENT — MIFFLIN-ST. JEOR: SCORE: 1177.69

## 2019-06-24 NOTE — PROGRESS NOTES
Name: Perlita Chavez      HPI:  Recent issues:  Here for f/u diabetes evaluation        1972. Diagnosis of diabetes mellitus age 2 1/2, living in [facility]  Initial treatment with NPH and Regular insulin medications  Had seen Dr. Sarah Sierra/[facility]  Switched to MDI insulin plan using Nv and Levemir insulin    8/19/14. Initial consult with me at my former clinic ([facility])  8/20/14. CDE RD evaluation, also tried demo OmniPod pump  Used Novolog Echo 1/2U pen, with I:C carb counting method  Tried Lantus BID dosing, then Tresiba (expensive), then Basaglar   Previously used Novolog insulin  5/2018. Switched to Fiasp 3/2018, then Fiasp non-formulary 2019  Current DM meds:   Basaglar Kwikpen 18U sq QAM   Fiasp 1:15 ratio (typically 6-12 per meal)    Nv sscale 1U per 40>150    Target  mg/dl    Using Accucheck Guide BG meter   Tests 3-4x/day   Not entering meal carbs for smartphone calculations, but using Droplr feature  Exercises with gym workouts, less often  Previous [facility] labs include:  Lab Results   Component Value Date    A1C 7.6 (H) 05/03/2019     05/03/2019    POTASSIUM 3.8 05/03/2019    CHLORIDE 107 05/03/2019    CO2 26 05/03/2019    ANIONGAP 9 05/03/2019     (H) 05/03/2019    BUN 17 05/03/2019    CR 0.76 05/03/2019    GFRESTIMATED >90 05/03/2019    GFRESTBLACK >90 05/03/2019    FINESSE 8.8 05/03/2019    CHOL 143 10/31/2018    TRIG 185 (H) 10/31/2018    HDL 61 10/31/2018    LDL 45 10/31/2018    NHDL 82 10/31/2018    UCRR 215 03/16/2018    MICROL 20 03/16/2018    UMALCR 9.44 03/16/2018     No history of vascular disease.  Takes simvastatin for hyperlipidemia management.  DM Complications:   Retinopathy    Previous proliferative DR and bilateral laser PC surgeries    Had cataracts, retinal detachment repair OD, higher occular pressures    Sees Fer Davison and EBER Trevizo/ophthalmology    Last eye exam with Dr. Davison 1/2019      Single, now works at [employer] in [place] [employer]  [employer] (Wed-Sunday's?)  Sees Dr. Humberto Melendez/[facility] for general medicine evaluations.  Also sees Dr. Blanco/[facility] rheumatology    PMH/PSH:  Past Medical History:   Diagnosis Date     Background diabetic retinopathy(362.01)     sees retina doc q3 mo. Ophtalmo=Dr Solomon Davison ( [phone]) at [facility], Retina Specialist = Dr James Souza ( [phone])     Hyperlipidemia LDL goal < 130      NONSPECIFIC MEDICAL HISTORY     photosensitivity reaction on lisinopril 3/08     RA (rheumatoid arthritis) (H)     seeing rheumatologist.     Sjoegren syndrome     seeing rheumatologist.     Type 1 diabetes mellitus (H)     retinopathy     Past Surgical History:   Procedure Laterality Date     C NONSPECIFIC PROCEDURE  8/00/97    T & A     C NONSPECIFIC PROCEDURE  5/00/99    L eye surgery     C NONSPECIFIC PROCEDURE  4/10/01    R cataract + IOL     C NONSPECIFIC PROCEDURE  2002    bilat vitrectomy     C NONSPECIFIC PROCEDURE  7/04    R eye vitrectomy     C NONSPECIFIC PROCEDURE  5/08    R vitrectomy & partial retinal detachment        Family Hx:  Family History   Problem Relation Age of Onset     Arthritis Mother         on celebrex,alive & healthy 2002     Arthritis Father         on celebrex(2002)     Heart Disease Father         heart attack 1990, 60 years old (2002)     Diabetes Father      Cerebrovascular Disease Father         age 70     Thyroid Disease Sister         on thyroid med for couple of years , 35 yrs now(2002)     Cancer Paternal Grandmother         STOMACH     Cerebrovascular Disease Paternal Grandfather         age 90         Social Hx:  Social History     Socioeconomic History     Marital status:      Spouse name:      Number of children: 0     Years of education: Not on file     Highest education level: Not on file   Occupational History     Occupation: [employer]     Employer: [employer]   [employer] Needs     Financial resource strain: Not on file     Food  insecurity:     Worry: Not on file     Inability: Not on file     Transportation needs:     Medical: Not on file     Non-medical: Not on file   Tobacco Use     Smoking status: Never Smoker     Smokeless tobacco: Never Used   Substance and Sexual Activity     Alcohol use: Yes     Comment: 1 drink every 3 weeks     Drug use: No     Sexual activity: Not Currently     Partners: Male     Comment:    Lifestyle     Physical activity:     Days per week: Not on file     Minutes per session: Not on file     Stress: Not on file   Relationships     Social connections:     Talks on phone: Not on file     Gets together: Not on file     Attends Rest[employer] service: Not on file     Active member of club or organization: Not on file     Attends meetings of clubs or organizations: Not on file     Relationship status: Not on file     Intimate partner violence:     Fear of current or ex partner: Not on file     Emotionally abused: Not on file     Physically abused: Not on file     Forced sexual activity: Not on file   Other Topics Concern     Parent/sibling w/ CABG, MI or angioplasty before 65F 55M? Not Asked   Social History Narrative     Not on file          MEDICATIONS:  has a current medication list which includes the following prescription(s): ace/arb/arni not prescribed, aspirin, azithromycin, insulin glargine, blood glucose, blood glucose monitoring, blood glucose, blood glucose, brimonidine, cyclosporine, diclofenac, diclofenac, dorzolamide-timolol, folic acid, infliximab, insulin aspart, insulin pen needle, losartan, methotrexate, fish oil triple strength, one daily multiple vitamin, prednisolone acetate, prednisone, simvastatin, sulfasalazine, timolol maleate, timolol maleate, valacyclovir, and maude 28.    ROS:     ROS: 10 point ROS neg other than the symptoms noted above in the HPI.    GENERAL: some fatigue; no weight changes, fevers, chills, night sweats.   HEENT: minimal/no vision from right eye, dry eyes/mouth; no  "dysphagia, odonophagia, diplopia  THYROID:  no apparent hyper or hypothyroid symptoms  CV: no chest pain, pressure, palpitations  LUNGS: no SOB, FUENTES, cough, wheezing   ABDOMEN: rare morning nausea; no diarrhea, constipation, abdominal pain  EXTREMITIES: no rashes, ulcers, edema  NEUROLOGY: decreased sensation at feet; no headaches, denies changes in vision, tingling, extremitiy numbness   MSK: mild hand tendinitis pains; no muscle aches, weakness  SKIN: denies rashes or lesions/foot sores  :  menses regular on Kristi OCP med  PSYCH:  stable mood, no significant anxiety or depression  ENDOCRINE: no heat or cold intolerance    Physical Exam   VS: /64   Pulse 86   Ht 1.613 m (5' 3.5\")   Wt 57.1 kg (125 lb 12.8 oz)   BMI 21.93 kg/m    GENERAL: AXOX3, NAD, well dressed, answering questions appropriately, appears stated age.  THYROID:  normal gland, no apparent nodules or goiter  LUNGS: CTAB, no wheezes, rales, or ronchi  ABDOMEN: soft, nontender, nondistended  EXTREMITIES: no pedal edema  NEUROLOGY: CN grossly intact, no tremors  MSK: grossly intact  SKIN: no rashes, no lesions    LABS:    All pertinent notes, labs, and images personally reviewed by me.     A/P:  Encounter Diagnosis   Name Primary?     Type 1 diabetes mellitus with stable proliferative retinopathy, unspecified laterality (H) Yes       Comments:  Reviewed the diabetes and health history, in detail.  Recent BG trends mildly elevated, variable.    Plan:  Discussed general issues with the diabetes diagnosis and managemen  We discussed the hgbA1c test which reflects previous overall glucose levels or control  Discussed the importance of blood glucose (BG) testing to assess glucose trends  Discussed use of the Accu-check Guide BG meter and insulin dose calculator (syncs with iPhone), device settings.  Provided general overview of the multiple daily injection (MDI) plan using rapid acting mealtime and longacting insulin " medications    Recommend:  Continue current insulin treatment and diabetes management   Continue use of Fiasp mealtime insulin, will increase quantity amount to 3 pens/mo  Continue use of the [employer] merry for the Accu-check Guide insulin dose calculator   We reviewed this feature today   Encouraged entering food carb amount to phone for insulin dose calculator use  Download the carb counting phone merry for use  Continue the losartan 25 mg daily dose  Check urine test today  Advise having fasting lipid panel testing at least annually  Keep regular f/u ophthalmology evaluation appointments    Addressed patient questions today    Patient Instructions   Continue the mealtime and Basaglar insulin plan  Use the Accu-check Guide- Smart phone merry   Enter the carb amount of meal/foods    Let phone merry calculate the total Fiasp dose needed    Download the CarbsControl or Calorie Luis phone merry   Use Nutrition information (at bottom of merry)   Use for grocery store or restaurant meal carb info     Contact our office if questions      Labs ordered today:   Orders Placed This Encounter   Procedures     Albumin Random Urine Quantitative with Creat Ratio     Radiology/Consults ordered today: None    More than 50% of the time spent with Ms. Chavez on counseling / coordinating her care.  Total appointment time was 30 minutes.    Follow-up:  2-3 mo.    Saurabh Lowe MD  Endocrinology  [place] [facility]

## 2019-06-24 NOTE — PATIENT INSTRUCTIONS
Continue the mealtime and Basaglar insulin plan  Use the Accu-check Guide- Smart phone merry   Enter the carb amount of meal/foods    Let phone merry calculate the total Fiasp dose needed    Download the CarbsControl or Calorie Luis phone merry   Use Nutrition information (at bottom of merry)   Use for grocery store or restaurant meal carb info     Contact our office if questions

## 2019-07-11 DIAGNOSIS — E10.3559 TYPE 1 DIABETES MELLITUS WITH STABLE PROLIFERATIVE RETINOPATHY, UNSPECIFIED LATERALITY (H): ICD-10-CM

## 2019-07-11 RX ORDER — INSULIN GLARGINE 100 [IU]/ML
INJECTION, SOLUTION SUBCUTANEOUS
Qty: 15 ML | Refills: 2 | Status: SHIPPED | OUTPATIENT
Start: 2019-07-11 | End: 2019-07-12

## 2019-07-11 NOTE — TELEPHONE ENCOUNTER
"Last Written Prescription Date:  5/31/18  Last Fill Quantity: 15 mL,  # refills: 11   Last office visit: 6/24/2019 with prescribing provider:  Mynor   Future Office Visit:   Next 5 appointments (look out 90 days)    Sep 17, 2019 11:00 AM CDT  Return Visit with Saurabh Lowe MD  [facility] ([facility] [address] [address] [phone]         Requested Prescriptions   Pending Prescriptions Disp Refills     insulin glargine (BASAGLAR KWIKPEN) 100 UNIT/ML pen [Pharmacy Med Name: BASAGLAR 100 U/ML KWIKPEN INJ 3ML]  0     Sig: INJECT 22 TO 24 UNITS SUBCUTANEOUSLY EVERY MORNING       Long Acting Insulin Protocol Passed - 7/11/2019  6:58 AM        Passed - Blood pressure less than 140/90 in past 6 months     BP Readings from Last 3 Encounters:   06/24/19 118/64   05/03/19 144/83   04/02/19 153/82                 Passed - LDL on file in past 12 months     Recent Labs   Lab Test 10/31/18  1025   LDL 45             Passed - Microalbumin on file in past 12 months     Recent Labs   Lab Test 06/24/19  1016   MICROL 16   UMALCR 7.56             Passed - Serum creatinine on file in past 12 months     Recent Labs   Lab Test 05/03/19  1421   CR 0.76             Passed - HgbA1C in past 3 or 6 months     If HgbA1C is 8 or greater, it needs to be on file within the past 3 months.  If less than 8, must be on file within the past 6 months.     Recent Labs   Lab Test 05/03/19  1421   A1C 7.6*             Passed - Medication is active on med list        Passed - Patient is age 18 or older        Passed - Recent (6 mo) or future (30 days) visit within the authorizing provider's specialty     Patient had office visit in the last 6 months or has a visit in the next 30 days with authorizing provider or within the authorizing provider's specialty.  See \"Patient Info\" tab in inbasket, or \"Choose Columns\" in Meds & Orders section of the refill encounter.              "

## 2019-07-12 DIAGNOSIS — E10.3559 TYPE 1 DIABETES MELLITUS WITH STABLE PROLIFERATIVE RETINOPATHY, UNSPECIFIED LATERALITY (H): Primary | ICD-10-CM

## 2019-07-12 DIAGNOSIS — E10.3599 TYPE 1 DIABETES MELLITUS WITH PROLIFERATIVE RETINOPATHY, MACULAR EDEMA PRESENCE UNSPECIFIED, UNSPECIFIED LATERALITY, UNSPECIFIED PROLIFERATIVE RETINOPATHY TYPE (H): ICD-10-CM

## 2019-08-05 DIAGNOSIS — E10.3599: ICD-10-CM

## 2019-08-05 NOTE — TELEPHONE ENCOUNTER
Epic had me update associated diagnosis.  Does it need to be updated on SIG line as well?    Gela Lira, RT (R)

## 2019-08-06 NOTE — TELEPHONE ENCOUNTER
"blood glucose monitoring (NO BRAND SPECIFIED) test strip 400 strip 3 8/12/2018     Last Written Prescription Date:  8/12/2018  Last Fill Quantity: 400,  # refills: 3   Last office visit: 6/24/2019 with prescribing provider: EVER Lowe   Future Office Visit:   Next 5 appointments (look out 90 days)    Sep 17, 2019 11:00 AM CDT  Return Visit with Saurabh Lowe MD  [address] [address] [phone]         Requested Prescriptions   Pending Prescriptions Disp Refills     blood glucose (NO BRAND SPECIFIED) test strip 400 strip 3     Sig: Use to test blood sugar 4 times daily or as directed, Accu-check Guide test strips, E10.9.       Diabetic Supplies Protocol Passed - 8/5/2019  1:15 PM        Passed - Medication is active on med list        Passed - Patient is 18 years of age or older        Passed - Recent (6 mo) or future (30 days) visit within the authorizing provider's specialty     Patient had office visit in the last 6 months or has a visit in the next 30 days with authorizing provider.  See \"Patient Info\" tab in inbasket, or \"Choose Columns\" in Meds & Orders section of the refill encounter.              "

## 2019-08-06 NOTE — TELEPHONE ENCOUNTER
Prescription approved per [facility] Refill Protocol.    Erin Flanagan RN

## 2019-08-11 DIAGNOSIS — E78.5 HYPERLIPIDEMIA LDL GOAL <100: ICD-10-CM

## 2019-08-12 NOTE — TELEPHONE ENCOUNTER
"Requested Prescriptions   Pending Prescriptions Disp Refills     simvastatin (ZOCOR) 40 MG tablet [Pharmacy Med Name: SIMVASTATIN 40MG TABLETS]  Last Written Prescription Date:  11/19/2018  Last Fill Quantity: 90,  # refills: 2   Last office visit: 10/31/2018 with prescribing provider:     Future Office Visit:   Next 5 appointments (look out 90 days)    Sep 17, 2019 11:00 AM CDT  Return Visit with Saurabh Lowe MD  [facility] ([address] [id number] [phone]        90 tablet 0     Sig: TAKE 1 TABLET BY MOUTH AT BEDTIME TO LOWER CHOLESTEROL       Statins Protocol Passed - 8/11/2019 10:41 PM        Passed - LDL on file in past 12 months     Recent Labs   Lab Test 10/31/18  1025   LDL 45             Passed - No abnormal creatine kinase in past 12 months     No lab results found.             Passed - Recent (12 mo) or future (30 days) visit within the authorizing provider's specialty     Patient had office visit in the last 12 months or has a visit in the next 30 days with authorizing provider or within the authorizing provider's specialty.  See \"Patient Info\" tab in inbasket, or \"Choose Columns\" in Meds & Orders section of the refill encounter.              Passed - Medication is active on med list        Passed - Patient is age 18 or older        Passed - No active pregnancy on record        Passed - No positive pregnancy test in past 12 months        "

## 2019-08-13 RX ORDER — SIMVASTATIN 40 MG
TABLET ORAL
Qty: 90 TABLET | Refills: 0 | Status: SHIPPED | OUTPATIENT
Start: 2019-08-13 | End: 2019-11-08

## 2019-08-13 NOTE — TELEPHONE ENCOUNTER
Medication is being filled for 1 time refill only due to:  Patient needs to be seen because due for physical in October.     No future appointments scheduled. frents message sent.

## 2019-08-20 ENCOUNTER — TRANSFERRED RECORDS (OUTPATIENT)
Dept: HEALTH INFORMATION MANAGEMENT | Facility: CLINIC | Age: 49
End: 2019-08-20

## 2019-09-17 ENCOUNTER — OFFICE VISIT (OUTPATIENT)
Dept: ENDOCRINOLOGY | Facility: CLINIC | Age: 49
End: 2019-09-17
Payer: COMMERCIAL

## 2019-09-17 VITALS
HEART RATE: 93 BPM | BODY MASS INDEX: 21.51 KG/M2 | HEIGHT: 64 IN | DIASTOLIC BLOOD PRESSURE: 72 MMHG | SYSTOLIC BLOOD PRESSURE: 118 MMHG | WEIGHT: 126 LBS

## 2019-09-17 DIAGNOSIS — E10.40 TYPE 1 DIABETES MELLITUS WITH DIABETIC NEUROPATHY (H): ICD-10-CM

## 2019-09-17 DIAGNOSIS — E10.3599 TYPE 1 DIABETES MELLITUS WITH PROLIFERATIVE RETINOPATHY, MACULAR EDEMA PRESENCE UNSPECIFIED, UNSPECIFIED LATERALITY, UNSPECIFIED PROLIFERATIVE RETINOPATHY TYPE (H): Primary | ICD-10-CM

## 2019-09-17 PROCEDURE — 99214 OFFICE O/P EST MOD 30 MIN: CPT | Performed by: INTERNAL MEDICINE

## 2019-09-17 PROCEDURE — 95250 CONT GLUC MNTR PHYS/QHP EQP: CPT | Performed by: INTERNAL MEDICINE

## 2019-09-17 ASSESSMENT — MIFFLIN-ST. JEOR: SCORE: 1178.59

## 2019-09-17 NOTE — PROGRESS NOTES
Name: Perlita Chavez      HPI:  Recent issues:  Here for f/u diabetes evaluation  Now on Medical Assistance insurance        1972. Diagnosis of diabetes mellitus age 2 1/2, living in [facility]  Initial treatment with NPH and Regular insulin medications  Had seen Dr. Sarah Sierra/[facility]  Switched to MDI insulin plan using Nv and Levemir insulin    8/19/14. Initial consult with me at my former clinic ([facility])  8/20/14. CDE RD evaluation, also tried demo OmniPod pump  Used Novolog Echo 1/2U pen, with I:C carb counting method  Tried Lantus BID dosing, then Tresiba (expensive), then Basaglar   Previously used Novolog insulin  5/2018. Switched to Fiasp 3/2018, then Fiasp non-formulary 2019  Meals typically brunch 10am and supper 9:30pm  Current DM meds:   Basaglar Kwikpen  18U sq morning   Fiasp    1:15 ratio (typically 6-12 per meal)      sscale 1U per 40>150      Target  mg/dl2+    Using Accucheck Guide BG meter   Tests 3-4x/day   Not entering meal carbs for smartphone calculations, but using Bright Things feature  Exercises with gym workouts, less often  Previous [facility] labs include:  Lab Results   Component Value Date    A1C 7.6 (H) 05/03/2019     05/03/2019    POTASSIUM 3.8 05/03/2019    CHLORIDE 107 05/03/2019    CO2 26 05/03/2019    ANIONGAP 9 05/03/2019     (H) 05/03/2019    BUN 17 05/03/2019    CR 0.76 05/03/2019    GFRESTIMATED >90 05/03/2019    GFRESTBLACK >90 05/03/2019    FINESSE 8.8 05/03/2019    CHOL 143 10/31/2018    TRIG 185 (H) 10/31/2018    HDL 61 10/31/2018    LDL 45 10/31/2018    NHDL 82 10/31/2018    UCRR 213 06/24/2019    MICROL 16 06/24/2019    UMALCR 7.56 06/24/2019     No history of vascular disease.  Takes simvastatin for hyperlipidemia management.  DM Complications:   Retinopathy    Previous proliferative DR and bilateral laser PC surgeries    Had cataracts, retinal detachment repair OD, higher occular pressures    Sees Fer Davison and EBER Trevizo/ophthalmology    Last eye exam  with Dr. 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 Humberto Melendez/[facility] for general medicine evaluations.  Also sees Dr. Blanco/[facility] rheumatology    PMH/PSH:  Past Medical History:   Diagnosis Date     Background diabetic retinopathy(362.01)     sees retina doc q3 mo. Ophtalmo=Dr Solomon Davison ( [phone]) at [facility], Retina Specialist = Dr James Souza ( [phone])     Hyperlipidemia LDL goal < 130      RA (rheumatoid arthritis) (H)     seeing rheumatologist.     Sjoegren syndrome     seeing rheumatologist.     Type 1 diabetes mellitus (H)     retinopathy     Past Surgical History:   Procedure Laterality Date     C NONSPECIFIC PROCEDURE  8/00/97    T & A     C NONSPECIFIC PROCEDURE  5/00/99    L eye surgery     C NONSPECIFIC PROCEDURE  4/10/01    R cataract + IOL     C NONSPECIFIC PROCEDURE  2002    bilat vitrectomy     C NONSPECIFIC PROCEDURE  7/04    R eye vitrectomy     C NONSPECIFIC PROCEDURE  5/08    R vitrectomy & partial retinal detachment        Family Hx:  Family History   Problem Relation Age of Onset     Arthritis Mother         on celebrex,alive & healthy 2002     Arthritis Father         on celebrex(2002)     Heart Disease Father         heart attack 1990, 60 years old (2002)     Diabetes Father      Cerebrovascular Disease Father         age 70     Thyroid Disease Sister         on thyroid med for couple of years , 35 yrs now(2002)     Cancer Paternal Grandmother         STOMACH     Cerebrovascular Disease Paternal Grandfather         age 90         Social Hx:  Social History     Socioeconomic History     Marital status:      Spouse name:      Number of children: 0     Years of education: Not on file     Highest education level: Not on file   Occupational History     Occupation: [employer]     Employer: [employer]      Financial resource strain: Not on file     Food insecurity:     Worry: Not on file     Inability: Not on file     Transportation needs:     Medical: Not on file     Non-medical: Not on file   Tobacco Use     Smoking status: Never Smoker     Smokeless tobacco: Never Used   Substance and Sexual Activity     Alcohol use: Yes     Comment: 1 drink every 3 weeks     Drug use: No     Sexual activity: Not Currently     Partners: Male     Comment:    Lifestyle     Physical activity:     Days per week: Not on file     Minutes per session: Not on file     Stress: Not on file   Relationships     Social connections:     Talks on phone: Not on file     Gets together: Not on file     Attends Hoahaoism service: Not on file     Active member of club or organization: Not on file     Attends meetings of clubs or organizations: Not on file     Relationship status: Not on file     Intimate partner violence:     Fear of current or ex partner: Not on file     Emotionally abused: Not on file     Physically abused: Not on file     Forced sexual activity: Not on file   Other Topics Concern     Parent/sibling w/ CABG, MI or angioplasty before 65F 55M? Not Asked   Social History Narrative     Not on file          MEDICATIONS:  has a current medication list which includes the following prescription(s): aspirin, blood glucose, brimonidine, cyclosporine, diclofenac, diclofenac, dorzolamide-timolol, folic acid, infliximab, insulin aspart, insulin glargine, insulin pen needle, losartan, methotrexate, fish oil triple strength, one daily multiple vitamin, prednisone, simvastatin, sulfasalazine, valacyclovir, maude 28, ace/arb/arni not prescribed, blood glucose, blood glucose, and blood glucose monitoring.    ROS:     ROS: 10 point ROS neg other than the symptoms noted above in the HPI.    GENERAL: some fatigue; no weight changes, fevers, chills, night sweats.   HEENT: minimal/no vision from right eye, dry eyes/mouth; no dysphagia,  "odonophagia, diplopia  THYROID:  no apparent hyper or hypothyroid symptoms  CV: no chest pain, pressure, palpitations  LUNGS: no SOB, FUENTES, cough, wheezing   ABDOMEN: rare morning nausea; no diarrhea, constipation, abdominal pain  EXTREMITIES: no rashes, ulcers, edema  NEUROLOGY: decreased sensation at feet; no headaches, denies changes in vision, tingling, extremitiy numbness   MSK: mild hand tendinitis pains; no muscle aches, weakness  SKIN: denies rashes or lesions/foot sores  :  menses regular on Kristi OCP med  PSYCH:  stable mood, no significant anxiety or depression  ENDOCRINE: no heat or cold intolerance    Physical Exam   VS: /72   Pulse 93   Ht 1.613 m (5' 3.5\")   Wt 57.2 kg (126 lb)   BMI 21.97 kg/m    GENERAL: AXOX3, NAD, well dressed, answering questions appropriately, appears stated age.  THYROID:  normal gland, no apparent nodules or goiter  LUNGS: CTAB, no wheezes, rales, or ronchi  ABDOMEN: soft, nontender, nondistended  EXTREMITIES: no pedal edema  NEUROLOGY: CN grossly intact, no tremors  MSK: grossly intact  SKIN: no rashes, no lesions    LABS:    All pertinent notes, labs, and images personally reviewed by me.     A/P:  Encounter Diagnoses   Name Primary?     Type 1 diabetes mellitus with proliferative retinopathy, macular edema presence unspecified, unspecified laterality, unspecified proliferative retinopathy type (H) Yes     Type 1 diabetes mellitus with diabetic neuropathy (H)        Comments:  Reviewed the diabetes and health history, in detail.  Recent BG trends mildly elevated, variable.    Plan:  Discussed general issues with the diabetes diagnosis and managemen  We discussed the hgbA1c test which reflects previous overall glucose levels or control  Discussed the importance of blood glucose (BG) testing to assess glucose trends  Discussed use of the Accu-check Guide BG meter and insulin dose calculator (syncs with iPhone), device settings.  Provided general overview of the " multiple daily injection (MDI) plan using rapid acting mealtime and longacting insulin medications    Recommend:  Continue current insulin treatment and diabetes management   Continue use of Fiasp mealtime insulin, if covered by current insurance.  Will send message for ALEXANDER HUERTA to Lantus Solostar insulin (on formulary) when Basaglar supply low  Continue use of the [employer] merry for the Accu-check Guide insulin dose calculator   Encouraged entering food carb amount to phone for insulin dose calculator use  No labs ordered today  Discussed glucose sensor options, recommended she wear a free LibrePersonal CGMS kit   Mary sensor (SN [id number]) placed at her left lateral abdomen without problems   Reviewed use of sensor and    Plan f/u evaluation with me in 2-weeks   Consider future purchase of sensor, if covered by insurance  Continue the losartan 25 mg daily dose  Advise having fasting lipid panel testing at least annually  Keep regular f/u ophthalmology evaluation appointments    Addressed patient questions today    Labs ordered today:   Orders Placed This Encounter   Procedures     GLUCOSE MONITORING CONTINUOUS, >=72 HR     Radiology/Consults ordered today: None    More than 50% of the time spent with Ms. Chavez on counseling / coordinating her care.  Total appointment time was 30 minutes.    Follow-up:  10/1/19 at 12:30pm    TI Lowe MD, MS [facility]

## 2019-09-27 ENCOUNTER — TRANSFERRED RECORDS (OUTPATIENT)
Dept: HEALTH INFORMATION MANAGEMENT | Facility: CLINIC | Age: 49
End: 2019-09-27

## 2019-10-01 ENCOUNTER — HEALTH MAINTENANCE LETTER (OUTPATIENT)
Age: 49
End: 2019-10-01

## 2019-10-01 ENCOUNTER — OFFICE VISIT (OUTPATIENT)
Dept: ENDOCRINOLOGY | Facility: CLINIC | Age: 49
End: 2019-10-01
Payer: COMMERCIAL

## 2019-10-01 ENCOUNTER — MYC MEDICAL ADVICE (OUTPATIENT)
Dept: ENDOCRINOLOGY | Facility: CLINIC | Age: 49
End: 2019-10-01

## 2019-10-01 VITALS
HEART RATE: 86 BPM | HEIGHT: 64 IN | DIASTOLIC BLOOD PRESSURE: 60 MMHG | BODY MASS INDEX: 21.72 KG/M2 | SYSTOLIC BLOOD PRESSURE: 104 MMHG | WEIGHT: 127.2 LBS

## 2019-10-01 DIAGNOSIS — E10.40 TYPE 1 DIABETES MELLITUS WITH DIABETIC NEUROPATHY (H): ICD-10-CM

## 2019-10-01 DIAGNOSIS — E10.3599 TYPE 1 DIABETES MELLITUS WITH PROLIFERATIVE RETINOPATHY, MACULAR EDEMA PRESENCE UNSPECIFIED, UNSPECIFIED LATERALITY, UNSPECIFIED PROLIFERATIVE RETINOPATHY TYPE (H): Primary | ICD-10-CM

## 2019-10-01 PROCEDURE — 95251 CONT GLUC MNTR ANALYSIS I&R: CPT | Performed by: INTERNAL MEDICINE

## 2019-10-01 PROCEDURE — 99213 OFFICE O/P EST LOW 20 MIN: CPT | Performed by: INTERNAL MEDICINE

## 2019-10-01 RX ORDER — FLASH GLUCOSE SENSOR
KIT MISCELLANEOUS
Qty: 2 EACH | Refills: 11 | Status: SHIPPED | OUTPATIENT
Start: 2019-10-01 | End: 2019-10-21

## 2019-10-01 ASSESSMENT — MIFFLIN-ST. JEOR: SCORE: 1184.04

## 2019-10-01 NOTE — PROGRESS NOTES
Name: Perlita Chavez      HPI:  Recent issues:  Here for f/u diabetes evaluation  Wearing LibrePersonal CGMS, likes it        1972. Diagnosis of diabetes mellitus age 2 1/2, living in [facility]  Initial treatment with NPH and Regular insulin medications  Had seen Dr. Sarah Sierra/[facility]  Switched to MDI insulin plan using Nv and Levemir insulin    8/19/14. Initial consult with me at my former clinic ([facility])  8/20/14. CDE RD evaluation, also tried demo OmniPod pump  Used Novolog Echo 1/2U pen, with I:C carb counting method  Tried Lantus BID dosing, then Tresiba (expensive), then Basaglar   Previously used Novolog insulin  5/2018. Switched to Fiasp 3/2018, then Fiasp non-formulary 2019  Meals typically brunch 10am and supper 9:30pm  Current DM meds:   Basaglar Kwikpen  18U sq morning   Fiasp    1:15 ratio (typically 6-12 per meal)      sscale 1U per 40>150      Target  mg/dl    Using Accucheck Guide BG meter   Tests 3-4x/day   Not entering meal carbs for smartphone calculations, but using ISF feature    Recent Mary CGMS data:    Exercises with gym workouts, less often  Previous [facility] labs include:  Lab Results   Component Value Date    A1C 7.6 (H) 05/03/2019     05/03/2019    POTASSIUM 3.8 05/03/2019    CHLORIDE 107 05/03/2019    CO2 26 05/03/2019    ANIONGAP 9 05/03/2019     (H) 05/03/2019    BUN 17 05/03/2019    CR 0.76 05/03/2019    GFRESTIMATED >90 05/03/2019    GFRESTBLACK >90 05/03/2019    FINESSE 8.8 05/03/2019    CHOL 143 10/31/2018    TRIG 185 (H) 10/31/2018    HDL 61 10/31/2018    LDL 45 10/31/2018    NHDL 82 10/31/2018    UCRR 213 06/24/2019    MICROL 16 06/24/2019    UMALCR 7.56 06/24/2019     No history of vascular disease.  Takes simvastatin for hyperlipidemia management.  DM Complications:   Retinopathy    Previous proliferative DR and bilateral laser PC surgeries    Had cataracts, retinal detachment repair OD, higher occular pressures    Sees Fer Galvan  [facility]/ophthalmology    Last eye exam with Dr. 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 Humberto Melendez/[facility] for general medicine evaluations.  Also sees Dr. Blanco/[facility] rheumatology    PMH/PSH:  Past Medical History:   Diagnosis Date     Background diabetic retinopathy(362.01)     sees retina doc q3 mo. Ophtalmo=Dr Solomon Davison ( [phone]) at [facility], Retina Specialist = Dr James Souza ( [phone])     Hyperlipidemia LDL goal < 130      RA (rheumatoid arthritis) (H)     seeing rheumatologist.     Sjoegren syndrome     seeing rheumatologist.     Type 1 diabetes mellitus (H)     retinopathy     Past Surgical History:   Procedure Laterality Date     C NONSPECIFIC PROCEDURE  8/00/97    T & A     C NONSPECIFIC PROCEDURE  5/00/99    L eye surgery     C NONSPECIFIC PROCEDURE  4/10/01    R cataract + IOL     C NONSPECIFIC PROCEDURE  2002    bilat vitrectomy     C NONSPECIFIC PROCEDURE  7/04    R eye vitrectomy     C NONSPECIFIC PROCEDURE  5/08    R vitrectomy & partial retinal detachment        Family Hx:  Family History   Problem Relation Age of Onset     Arthritis Mother         on celebrex,alive & healthy 2002     Arthritis Father         on celebrex(2002)     Heart Disease Father         heart attack 1990, 60 years old (2002)     Diabetes Father      Cerebrovascular Disease Father         age 70     Thyroid Disease Sister         on thyroid med for couple of years , 35 yrs now(2002)     Cancer Paternal Grandmother         STOMACH     Cerebrovascular Disease Paternal Grandfather         age 90         Social Hx:  Social History     Socioeconomic History     Marital status:      Spouse name:      Number of children: 0     Years of education: Not on file     Highest education level: Not on file   Occupational History     Occupation: [employer]     Employer:  [employer]   Social Needs     Financial resource strain: Not on file     Food insecurity:     Worry: Not on file     Inability: Not on file     Transportation needs:     Medical: Not on file     Non-medical: Not on file   Tobacco Use     Smoking status: Never Smoker     Smokeless tobacco: Never Used   Substance and Sexual Activity     Alcohol use: Yes     Comment: 1 drink every 3 weeks     Drug use: No     Sexual activity: Not Currently     Partners: Male     Comment:    Lifestyle     Physical activity:     Days per week: Not on file     Minutes per session: Not on file     Stress: Not on file   Relationships     Social connections:     Talks on phone: Not on file     Gets together: Not on file     Attends Baptist service: Not on file     Active member of club or organization: Not on file     Attends meetings of clubs or organizations: Not on file     Relationship status: Not on file     Intimate partner violence:     Fear of current or ex partner: Not on file     Emotionally abused: Not on file     Physically abused: Not on file     Forced sexual activity: Not on file   Other Topics Concern     Parent/sibling w/ CABG, MI or angioplasty before 65F 55M? Not Asked   Social History Narrative     Not on file          MEDICATIONS:  has a current medication list which includes the following prescription(s): aspirin, brimonidine, continuous blood glucose monitoring, cyclosporine, diclofenac, dorzolamide-timolol, folic acid, infliximab, insulin aspart, insulin glargine, losartan, methotrexate, fish oil triple strength, one daily multiple vitamin, prednisone, simvastatin, sulfasalazine, valacyclovir, maude 28, ace/arb/arni not prescribed, blood glucose, blood glucose, blood glucose monitoring, blood glucose, diclofenac, and insulin pen needle.    ROS:     ROS: 10 point ROS neg other than the symptoms noted above in the HPI.    GENERAL: some fatigue; no weight changes, fevers, chills, night sweats.  "  HEENT: minimal/no vision from right eye, dry eyes/mouth; no dysphagia, odonophagia, diplopia  THYROID:  no apparent hyper or hypothyroid symptoms  CV: no chest pain, pressure, palpitations  LUNGS: no SOB, FUENTES, cough, wheezing   ABDOMEN: rare morning nausea; no diarrhea, constipation, abdominal pain  EXTREMITIES: no rashes, ulcers, edema  NEUROLOGY: decreased sensation at feet; no headaches, denies changes in vision, tingling, extremitiy numbness   MSK: mild hand tendinitis pains; no muscle aches, weakness  SKIN: denies rashes or lesions/foot sores  :  menses regular on Kristi OCP med  PSYCH:  stable mood, no significant anxiety or depression  ENDOCRINE: no heat or cold intolerance    Physical Exam   VS: /60   Pulse 86   Ht 1.613 m (5' 3.5\")   Wt 57.7 kg (127 lb 3.2 oz)   BMI 22.18 kg/m    GENERAL: AXOX3, NAD, well dressed, answering questions appropriately, appears stated age.  THYROID:  normal gland, no apparent nodules or goiter  ABDOMEN: soft, nontender, nondistended  EXTREMITIES: no pedal edema  NEUROLOGY: CN grossly intact, no tremors  MSK: grossly intact  SKIN: no rashes, no lesions    LABS:    All pertinent notes, labs, and images personally reviewed by me.     A/P:  Encounter Diagnoses   Name Primary?     Type 1 diabetes mellitus with proliferative retinopathy, macular edema presence unspecified, unspecified laterality, unspecified proliferative retinopathy type (H) Yes     Type 1 diabetes mellitus with diabetic neuropathy (H)        Comments:  Reviewed the diabetes and health history, in detail.    Plan:  Discussed general issues with the diabetes diagnosis and managemen  We discussed the hgbA1c test which reflects previous overall glucose levels or control  Discussed the importance of blood glucose (BG) testing to assess glucose trends  Discussed use of the Accu-check Guide BG meter and insulin dose calculator (syncs with iPhone), device settings.  Provided general overview of the multiple " daily injection (MDI) plan using rapid acting mealtime and longacting insulin medications    Recommend:  Continue current insulin treatment and diabetes management   Continue use of Fiasp mealtime insulin, if covered by current insurance.     OK to Lantus Solostar insulin (on formulary) when Basaglar supply low  Continue use of the [employer] merry for the Accu-check Guide insulin dose calculator   Encouraged entering food carb amount to phone for insulin dose calculator use  No labs ordered today  Continue Mary sensor use:   Reviewed use of Mary sensor data, direction arrows   Removed the sample Mary sensor from her LLQ abdomen today,    Applied new sample Mary sensor to adjacent LLQ abdomen, use with same [place]   Sent eRx for LibrePersonal sensors to her local pharmacy... contact me if cost issues   Continue the losartan 25 mg daily dose  Advise having fasting lipid panel testing at least annually  Keep regular f/u ophthalmology evaluation appointments    Addressed patient questions today    Labs ordered today:   Orders Placed This Encounter   Procedures     GLUCOSE MONITOR, 72 HOUR, PHYS INTERP     Radiology/Consults ordered today: None    More than 50% of the time spent with Ms. Chavez on counseling / coordinating her care.  Total appointment time was 20 minutes.    Follow-up:  6 weeks    TI Lowe MD, MS [facility]

## 2019-10-01 NOTE — TELEPHONE ENCOUNTER
Dr. Lowe-     Can we do a PA for Pt's FreeStyle Mary supplies?     Thank you,   Esperanza MANNING RN

## 2019-10-03 ENCOUNTER — TELEPHONE (OUTPATIENT)
Dept: ENDOCRINOLOGY | Facility: CLINIC | Age: 49
End: 2019-10-03

## 2019-10-03 DIAGNOSIS — E10.40 TYPE 1 DIABETES MELLITUS WITH DIABETIC NEUROPATHY (H): ICD-10-CM

## 2019-10-03 DIAGNOSIS — E10.3599 TYPE 1 DIABETES MELLITUS WITH PROLIFERATIVE RETINOPATHY, MACULAR EDEMA PRESENCE UNSPECIFIED, UNSPECIFIED LATERALITY, UNSPECIFIED PROLIFERATIVE RETINOPATHY TYPE (H): ICD-10-CM

## 2019-10-03 NOTE — TELEPHONE ENCOUNTER
Message noted.  This patient has Type 1 diabetes mellitus and needs to use the Mary continuous glucose sensor to provide glucose trend information for her insulin dosing, also easier detection of high and low glucose levels.  It is medically necessary.    I would like to initiate a Prior Authorization for the Mary Personal CGMS device.  Hope this is successful.    TI Lowe MD, MS  [facility]

## 2019-10-03 NOTE — TELEPHONE ENCOUNTER
Prior Authorization Retail Medication Request    Medication/Dose: Freestyle Mary 14 day sensor  ICD code (if different than what is on RX):  E10.3599, E10.40  Previously Tried and Failed:  None  Rationale:  This patient has Type 1 diabetes mellitus and needs to use the Mary continuous glucose sensor to provide glucose trend information for her insulin dosing, also easier detection of high and low glucose levels.    Insurance Name:  [facility]  Insurance ID:  [id number]      Pharmacy Information (if different than what is on RX)  Name:  [id number]  Phone:  [phone]

## 2019-10-03 NOTE — TELEPHONE ENCOUNTER
Prior authorization started in separate telephone encounter.    Shlomo Nelson CMA on 10/3/2019 at 9:22 AM

## 2019-10-03 NOTE — LETTER
2019      Perlita Chavez  [address] [place] [address]        To Whom It May Concern,     I have previously seen Ms Perlita Chavez ( 70, [facility] #[id number])) for evaluation and management of Type 1 diabetes mellitus.  She uses rapid acting and long acting insulins with her Accu-check Guide blood glucose meter, though has had persistently elevated and labile glucose trends and an elevated [id number] level.    I have advise use of the Freestyle LibrePersonal continuous glucose sensor device.  This sensor measures the glucose level every 5 minutes and provides valuable glucose level and glucose trend information.  I Mary sensor device is medically necessary.    Please review this LibrePersonal continuous glucose sensor (and [place]) device Rx for this patient, contact me if questions.     Sincerely,        TI Lowe MD, MS  [facility]                
Yes

## 2019-10-07 NOTE — TELEPHONE ENCOUNTER
CENTRAL PRIOR AUTHORIZATION  [phone]    PA Initiation    Medication: Freestyle Mary 14 day sensor  Insurance Company: [employer] - Phone [phone] Fax [phone]  Pharmacy Filling the Rx: [id number] [place], [place] - [facility] AT [facility] [address]  Filling Pharmacy Phone: [phone]  Filling Pharmacy Fax:    Start Date: 10/7/2019    Also faxed with chart notes from office visit dated 09/17/19, 10/01/19

## 2019-10-10 NOTE — TELEPHONE ENCOUNTER
PRIOR AUTHORIZATION DENIED    Medication: Freestyle Mary 14 day sensor - DENIED     Denial Date: 10/7/2019    Denial Rational: You have been prescribed a drug that is not covered under your pharmacy benefit.        Appeal Information: IF THE PROVIDER WOULD LIKE APPEAL THIS DENIAL, PLEASE HAVE THEM PROVIDE A LETTER OF MEDICAL NECESSITY ALONG WITH ANY DOCUMENTATION THAT STATES THERAPIES TRIED/OUTCOMES. ONCE IT HAS BEEN PLACED IN THE PATIENT'S CHART, PLEASE NOTIFY THE PA TEAM ONCE IT HAS BEEN COMPLETED AND WE CAN INITIATE THE APPEAL ON BEHALF OF THE PROVIDER AND PATIENT.

## 2019-10-13 NOTE — TELEPHONE ENCOUNTER
Message noted.  I would like to send an appeal letter, have completed a letter in patient's chart.  Please print it (and I can sign it), send to the appropriate fax number.  Thanks.    TI Lowe MD, MS  Endocrinology  [facility]

## 2019-10-15 NOTE — TELEPHONE ENCOUNTER
CENTRAL PRIOR AUTHORIZATION  [phone]    Medication Appeal Initiation    We have initiated an appeal for the requested medication:  Medication: Freestyle Mary 14 day sensor - DENIED   Appeal Start Date:  10/15/2019  Insurance Company: Currently - Phone [phone] Fax [phone]  Comments:  [employer] Plus  (F): [phone] (P): [phone]  JUD, original denial letter, chart notes from o.v.: 10/01/19, 09/17/19, 06/24/19, lab values for A1C

## 2019-10-15 NOTE — TELEPHONE ENCOUNTER
Letter of medical necessity written. Please file for appeal.    Shlomo Nelson CMA on 10/15/2019 at 1:09 PM

## 2019-10-18 NOTE — TELEPHONE ENCOUNTER
MEDICATION APPEAL DENIED    Medication: Freestyle Mary 14 day sensor - Appeal Denied     Denial Date: 10/18/2019    Denial Rational: This product is not covered under your pharmacy benefit. Please check your medical benefit for possible coverage. Questions regarding coverage should be directed to the health plan by calling the number on the back of your insurance card.        Second Level Appeal Information: Second level appeals will be managed by the clinic staff and provider. Please contact the [employer] Prior Authorization Team if additional information about the denial is needed. See below for the second step, which is the State Appeal.

## 2019-10-20 NOTE — TELEPHONE ENCOUNTER
Message noted.  I would like to do a second-level appeal for the Mary device and have written an appeal letter in her chart.  Please assist with sending it and contact me if any questions/issues.  Thanks!    TI Lowe MD, MS  Endocrinology  [facility]

## 2019-10-21 RX ORDER — FLASH GLUCOSE SENSOR
KIT MISCELLANEOUS
Qty: 2 EACH | Refills: 11 | Status: SHIPPED | OUTPATIENT
Start: 2019-10-21 | End: 2024-06-18

## 2019-10-21 NOTE — TELEPHONE ENCOUNTER
Letter and most recent office visit note printed off an faxed over to [facility]. Will also have Dr. Lowe send Rx for Mary sensor to [facility] to see if this will be covered by patient's medical insurance.    Shlomo Nelson CMA on 10/21/2019 at 11:55 AM

## 2019-10-21 NOTE — TELEPHONE ENCOUNTER
Message noted, agree.  I have now signed the Freestyle LibrePersonal sensor Rx to  [facility].    TI Lowe MD, MS  Endocrinology  [facility]

## 2019-11-01 ENCOUNTER — TRANSFERRED RECORDS (OUTPATIENT)
Dept: MULTI SPECIALTY CLINIC | Facility: CLINIC | Age: 49
End: 2019-11-01

## 2019-11-01 LAB — PAP SMEAR - HIM PATIENT REPORTED: NEGATIVE

## 2019-11-05 ENCOUNTER — OFFICE VISIT (OUTPATIENT)
Dept: ENDOCRINOLOGY | Facility: CLINIC | Age: 49
End: 2019-11-05
Payer: COMMERCIAL

## 2019-11-05 VITALS
BODY MASS INDEX: 21.85 KG/M2 | HEART RATE: 79 BPM | HEIGHT: 64 IN | DIASTOLIC BLOOD PRESSURE: 61 MMHG | SYSTOLIC BLOOD PRESSURE: 123 MMHG | WEIGHT: 128 LBS

## 2019-11-05 DIAGNOSIS — E10.40 TYPE 1 DIABETES MELLITUS WITH DIABETIC NEUROPATHY (H): ICD-10-CM

## 2019-11-05 DIAGNOSIS — E10.3599 TYPE 1 DIABETES MELLITUS WITH PROLIFERATIVE RETINOPATHY, MACULAR EDEMA PRESENCE UNSPECIFIED, UNSPECIFIED LATERALITY, UNSPECIFIED PROLIFERATIVE RETINOPATHY TYPE (H): Primary | ICD-10-CM

## 2019-11-05 PROCEDURE — 99215 OFFICE O/P EST HI 40 MIN: CPT | Performed by: INTERNAL MEDICINE

## 2019-11-05 ASSESSMENT — MIFFLIN-ST. JEOR: SCORE: 1187.66

## 2019-11-05 NOTE — PROGRESS NOTES
Name: Perlita Chavez      HPI:  Recent issues:  Here for f/u diabetes evaluation  Feeling pretty well today, though has frustrations with recent insurance denial of Mary glucose sensor.          1972. Diagnosis of diabetes mellitus age 2 1/2, living in [facility]  Initial treatment with NPH and Regular insulin medications  Had seen Dr. Sarah Sierra/[facility]  Switched to MDI insulin plan using Nv and Levemir insulin    8/19/14. Initial consult with me at my former clinic ([facility])  8/20/14. CDE RD evaluation, also tried demo OmniPod pump  Used Novolog Echo 1/2U pen, with I:C carb counting method  Tried Lantus BID dosing, then Tresiba (expensive), then Basaglar   Previously used Novolog insulin  5/2018. Switched to Fiasp 3/2018, then Fiasp non-formulary 2019  Meals typically brunch 10am and supper 9:30pm  Current DM meds:   Lantus Solostar   18U each morning   Fiasp Flextouch  1:15 ratio (typically 6-12 per meal)       sscale 1U per 40>150       Target  mg/dl    Using Accucheck Guide BG meter   Tests 4x/day   Does own I:C and ISF calculations  9/2019. She has worn a free sample LibrePersonal continuous glucose sensor   Subsequent Mary Rx denied by her insurance plan   Denial appeal submitted, results pending  Exercises with gym workouts, less often  Previous [facility] labs include:  Lab Results   Component Value Date    A1C 7.6 (H) 05/03/2019     05/03/2019    POTASSIUM 3.8 05/03/2019    CHLORIDE 107 05/03/2019    CO2 26 05/03/2019    ANIONGAP 9 05/03/2019     (H) 05/03/2019    BUN 17 05/03/2019    CR 0.76 05/03/2019    GFRESTIMATED >90 05/03/2019    GFRESTBLACK >90 05/03/2019    FINESSE 8.8 05/03/2019    CHOL 143 10/31/2018    TRIG 185 (H) 10/31/2018    HDL 61 10/31/2018    LDL 45 10/31/2018    NHDL 82 10/31/2018    UCRR 213 06/24/2019    MICROL 16 06/24/2019    UMALCR 7.56 06/24/2019     Lab Results   Component Value Date    TSH 2.98 05/03/2019    T4 0.96 02/18/2011     No history of vascular  disease.  Takes simvastatin for hyperlipidemia management.  DM Complications:   Retinopathy    Previous proliferative DR and bilateral laser PC surgeries    Had cataracts, retinal detachment repair OD, higher occular pressures    Significant vision loss, needs print magnification    Sees Fer Davison and EBER Trevizo/ophthalmology, last eye exam with Dr. Davison 1/2019   Neuropathy:    Decreased sensation in feet      Single, works at [employer] in [place] (Wed-Sunday's?)  Sees Dr. Humberto Melendez/[facility] for general medicine evaluations.  Also sees Dr. Blanco/[facility] rheumatology    PMH/PSH:  Past Medical History:   Diagnosis Date     Diabetic retinopathy associated with type 1 diabetes mellitus (H)     proliferative DR and bilateral laser PC surgeries     Hyperlipidemia LDL goal < 130      RA (rheumatoid arthritis) (H)     seeing rheumatologist.     Sjoegren syndrome     seeing rheumatologist.     Type 1 diabetes mellitus (H)     retinopathy, neuropathy     Visual impairment      Past Surgical History:   Procedure Laterality Date     C NONSPECIFIC PROCEDURE  8/00/97    T & A     C NONSPECIFIC PROCEDURE  5/00/99    L eye surgery     C NONSPECIFIC PROCEDURE  4/10/01    R cataract + IOL     C NONSPECIFIC PROCEDURE  2002    bilat vitrectomy     C NONSPECIFIC PROCEDURE  7/04    R eye vitrectomy     C NONSPECIFIC PROCEDURE  5/08    R vitrectomy & partial retinal detachment        Family Hx:  Family History   Problem Relation Age of Onset     Arthritis Mother         on celebrex,alive & healthy 2002     Arthritis Father         on celebrex(2002)     Heart Disease Father         heart attack 1990, 60 years old (2002)     Diabetes Father      Cerebrovascular Disease Father         age 70     Thyroid Disease Sister         on thyroid med for couple of years , 35 yrs now(2002)     Cancer Paternal Grandmother         STOMACH     Cerebrovascular Disease Paternal Grandfather         age 90          Social Hx:  Social History     Socioeconomic History     Marital status:      Spouse name:      Number of children: 0     Years of education: Not on file     Highest education level: Not on file   Occupational History     Occupation: [employer]     Employer: [employer]   Social Needs     Financial resource strain: Not on file     Food insecurity:     Worry: Not on file     Inability: Not on file     Transportation needs:     Medical: Not on file     Non-medical: Not on file   Tobacco Use     Smoking status: Never Smoker     Smokeless tobacco: Never Used   Substance and Sexual Activity     Alcohol use: Yes     Comment: 1 drink every 3 weeks     Drug use: No     Sexual activity: Not Currently     Partners: Male     Comment:    Lifestyle     Physical activity:     Days per week: Not on file     Minutes per session: Not on file     Stress: Not on file   Relationships     Social connections:     Talks on phone: Not on file     Gets together: Not on file     Attends Gnosticist service: Not on file     Active member of club or organization: Not on file     Attends meetings of clubs or organizations: Not on file     Relationship status: Not on file     Intimate partner violence:     Fear of current or ex partner: Not on file     Emotionally abused: Not on file     Physically abused: Not on file     Forced sexual activity: Not on file   Other Topics Concern     Parent/sibling w/ CABG, MI or angioplasty before 65F 55M? Not Asked   Social History Narrative     Not on file          MEDICATIONS:  has a current medication list which includes the following prescription(s): ace/arb/arni not prescribed, aspirin, blood glucose, blood glucose, blood glucose monitoring, blood glucose, brimonidine, continuous blood glucose monitoring, cyclosporine, diclofenac, diclofenac, dorzolamide-timolol, folic acid, infliximab, insulin aspart, insulin glargine, insulin pen needle, losartan, methotrexate,  "fish oil triple strength, one daily multiple vitamin, prednisone, simvastatin, sulfasalazine, valacyclovir, and kristi 28.    ROS:     ROS: 10 point ROS neg other than the symptoms noted above in the HPI.    GENERAL: some fatigue; no weight changes, fevers, chills, night sweats.   HEENT: minimal/no vision from right eye, dry eyes/mouth; no dysphagia, odonophagia, diplopia  THYROID:  no apparent hyper or hypothyroid symptoms  CV: no chest pain, pressure, palpitations  LUNGS: no SOB, FUENTES, cough, wheezing   ABDOMEN: rare morning nausea; no diarrhea, constipation, abdominal pain  EXTREMITIES: no rashes, ulcers, edema  NEUROLOGY: decreased sensation at feet; no headaches, denies changes in vision, tingling, extremitiy numbness   MSK: mild hand tendinitis pains; no muscle aches, weakness  SKIN: denies rashes or lesions/foot sores  :  menses regular on Kristi OCP med  PSYCH:  stable mood, no significant anxiety or depression  ENDOCRINE: no heat or cold intolerance    Physical Exam   VS: /61   Pulse 79   Ht 1.613 m (5' 3.5\")   Wt 58.1 kg (128 lb)   BMI 22.32 kg/m    GENERAL: AXOX3, NAD, well dressed, answering questions appropriately, appears stated age.  THYROID:  normal gland, no apparent nodules or goiter  ABDOMEN: soft, nontender, nondistended  EXTREMITIES: no pedal edema  NEUROLOGY: CN grossly intact, no tremors  MSK: grossly intact  SKIN: no rashes, no lesions    LABS:    All pertinent notes, labs, and images personally reviewed by me.     A/P:  Encounter Diagnoses   Name Primary?     Type 1 diabetes mellitus with proliferative retinopathy, macular edema presence unspecified, unspecified laterality, unspecified proliferative retinopathy type (H) Yes     Type 1 diabetes mellitus with diabetic neuropathy (H)        Comments:  Reviewed the diabetes and health history, in detail.    Plan:  Discussed general issues with the diabetes diagnosis and managemen  We discussed the hgbA1c test which reflects previous " overall glucose levels or control  Discussed the importance of blood glucose (BG) testing to assess glucose trends  Discussed use of the Accu-check Guide BG meter  Provided general overview of the multiple daily injection (MDI) plan using rapid acting mealtime    and longacting insulin medications    Recommend:  Continue current insulin treatment and diabetes management, with Fiasp mealtime & Lantus    basal insulin pen use  Reviewed dose strategy with premeal or presnack, also correction dose Fiasp insulin use  Goal target premeal glucose 100-150 mg/dl  Plan repeat lab tests next appointment  Reviewed glucose testing issues:   Limitations with her current fingertip blood glucose meter testing with:    Time required for work-breaks and fingertip testing in private space    Less frequent testing    Difficulty reading font on BG meter   Advantages of using a CGMS device such as the Freestyle Libboosonal sensor:    Less time required for obtaining glucose result, improved frequency of testing    Able to scan sensor through her work clothing    Glucose trend information to assess premeal/postmeal      hyperglycemia/hypoglycemia    Glucose trend evaluation after exercise    Expect less hypoglycemia and overall morbidity, less risk of hospitalization with      the Type 1 diabetes management     She has had her own experience using a sample Mary in 10/2019, improved glucose     data utilization and improved glucose control    Patient wishes to pursue the [place] [employer] appeal hearing for 11/19/19     at 1:45pm and I agree with that plan     Continue the losartan daily dose  Continue current simvastatin lipid medication   Advise having fasting lipid panel testing at least annually  Keep regular f/u ophthalmology evaluation appointments    She asked me to send my progress note to the [place] [employer]     (fax [phone]) before the appeal meeting and I agreed, notes faxed today  Addressed patient  questions today    Future labs ordered today:   Orders Placed This Encounter   Procedures     Basic metabolic panel     ALT     Hemoglobin A1c     Vitamin D Deficiency     Radiology/Consults ordered today: None    More than 50% of the time spent with Ms. Chavez on counseling / coordinating her care.  Total    appointment time was 40 minutes.    Follow-up:  11/19/19 at 1:15pm    TI Lowe MD, MS  Endocrinology  [facility]

## 2019-11-07 ENCOUNTER — TRANSFERRED RECORDS (OUTPATIENT)
Dept: HEALTH INFORMATION MANAGEMENT | Facility: CLINIC | Age: 49
End: 2019-11-07

## 2019-11-07 LAB — RETINOPATHY: POSITIVE

## 2019-11-08 ENCOUNTER — OFFICE VISIT (OUTPATIENT)
Dept: INTERNAL MEDICINE | Facility: CLINIC | Age: 49
End: 2019-11-08
Payer: COMMERCIAL

## 2019-11-08 VITALS
WEIGHT: 126.7 LBS | HEART RATE: 104 BPM | BODY MASS INDEX: 21.63 KG/M2 | DIASTOLIC BLOOD PRESSURE: 78 MMHG | OXYGEN SATURATION: 97 % | RESPIRATION RATE: 19 BRPM | HEIGHT: 64 IN | TEMPERATURE: 98.3 F | SYSTOLIC BLOOD PRESSURE: 104 MMHG

## 2019-11-08 DIAGNOSIS — E10.39 TYPE 1 DIABETES MELLITUS WITH OTHER OPHTHALMIC COMPLICATION (H): ICD-10-CM

## 2019-11-08 DIAGNOSIS — Z00.00 ROUTINE HISTORY AND PHYSICAL EXAMINATION OF ADULT: Primary | ICD-10-CM

## 2019-11-08 DIAGNOSIS — E78.5 HYPERLIPIDEMIA LDL GOAL <100: ICD-10-CM

## 2019-11-08 DIAGNOSIS — M06.9 RHEUMATOID ARTHRITIS, INVOLVING UNSPECIFIED SITE, UNSPECIFIED RHEUMATOID FACTOR PRESENCE: ICD-10-CM

## 2019-11-08 DIAGNOSIS — D84.9 IMMUNOSUPPRESSED STATUS (H): ICD-10-CM

## 2019-11-08 LAB
ALBUMIN SERPL-MCNC: 3.6 G/DL (ref 3.4–5)
ALP SERPL-CCNC: 45 U/L (ref 40–150)
ALT SERPL W P-5'-P-CCNC: 20 U/L (ref 0–50)
ANION GAP SERPL CALCULATED.3IONS-SCNC: 6 MMOL/L (ref 3–14)
AST SERPL W P-5'-P-CCNC: 18 U/L (ref 0–45)
BILIRUB SERPL-MCNC: 0.6 MG/DL (ref 0.2–1.3)
BUN SERPL-MCNC: 21 MG/DL (ref 7–30)
CALCIUM SERPL-MCNC: 8.8 MG/DL (ref 8.5–10.1)
CHLORIDE SERPL-SCNC: 105 MMOL/L (ref 94–109)
CO2 SERPL-SCNC: 26 MMOL/L (ref 20–32)
CREAT SERPL-MCNC: 0.79 MG/DL (ref 0.52–1.04)
GFR SERPL CREATININE-BSD FRML MDRD: 87 ML/MIN/{1.73_M2}
GLUCOSE SERPL-MCNC: 89 MG/DL (ref 70–99)
HBA1C MFR BLD: 7.6 % (ref 0–5.6)
HGB BLD-MCNC: 11.4 G/DL (ref 11.7–15.7)
POTASSIUM SERPL-SCNC: 3.7 MMOL/L (ref 3.4–5.3)
PROT SERPL-MCNC: 7.8 G/DL (ref 6.8–8.8)
SODIUM SERPL-SCNC: 137 MMOL/L (ref 133–144)
TSH SERPL DL<=0.005 MIU/L-ACNC: 3.34 MU/L (ref 0.4–4)

## 2019-11-08 PROCEDURE — 82043 UR ALBUMIN QUANTITATIVE: CPT | Performed by: INTERNAL MEDICINE

## 2019-11-08 PROCEDURE — 80061 LIPID PANEL: CPT | Performed by: INTERNAL MEDICINE

## 2019-11-08 PROCEDURE — 80053 COMPREHEN METABOLIC PANEL: CPT | Performed by: INTERNAL MEDICINE

## 2019-11-08 PROCEDURE — 85018 HEMOGLOBIN: CPT | Performed by: INTERNAL MEDICINE

## 2019-11-08 PROCEDURE — 84443 ASSAY THYROID STIM HORMONE: CPT | Performed by: INTERNAL MEDICINE

## 2019-11-08 PROCEDURE — 99396 PREV VISIT EST AGE 40-64: CPT | Performed by: INTERNAL MEDICINE

## 2019-11-08 PROCEDURE — 36415 COLL VENOUS BLD VENIPUNCTURE: CPT | Performed by: INTERNAL MEDICINE

## 2019-11-08 PROCEDURE — 83036 HEMOGLOBIN GLYCOSYLATED A1C: CPT | Performed by: INTERNAL MEDICINE

## 2019-11-08 RX ORDER — SIMVASTATIN 40 MG
TABLET ORAL
Qty: 90 TABLET | Refills: 3 | Status: SHIPPED | OUTPATIENT
Start: 2019-11-08 | End: 2020-12-04

## 2019-11-08 ASSESSMENT — ENCOUNTER SYMPTOMS
NERVOUS/ANXIOUS: 1
DIARRHEA: 0
DIZZINESS: 0
ABDOMINAL PAIN: 0
NERVOUS/ANXIOUS: 0
HEMATURIA: 0
CONSTIPATION: 0
FEVER: 0
EYE PAIN: 0
HEMATOCHEZIA: 0
FREQUENCY: 0
COUGH: 0
CHILLS: 0

## 2019-11-08 ASSESSMENT — MIFFLIN-ST. JEOR: SCORE: 1181.77

## 2019-11-08 NOTE — PROGRESS NOTES
SUBJECTIVE:   CC: Perlita Chavez is an 48 year old woman who presents for preventive health visit.     Healthy Habits:     Getting at least 3 servings of Calcium per day:  Yes    Bi-annual eye exam:  Yes    Dental care twice a year:  Yes    Sleep apnea or symptoms of sleep apnea:  None    Diet:  Diabetic and Carbohydrate counting    Frequency of exercise:  1 day/week    Duration of exercise:  45-60 minutes    Taking medications regularly:  Yes    Medication side effects:  None    PHQ-2 Total Score: 0    Additional concerns today:  No    Pt sees endocrinologist for diabetes and sees rheumatologist for Rheumatoid arthritis     Today's PHQ-2 Score:   PHQ-2 ( 1999 [employer]) 11/8/2019   Q1: Little interest or pleasure in doing things 0   Q2: Feeling down, depressed or hopeless 0   PHQ-2 Score 0   Q1: Little interest or pleasure in doing things Not at all   Q2: Feeling down, depressed or hopeless Not at all   PHQ-2 Score 0       Abuse: Current or Past(Physical, Sexual or Emotional)- No  Do you feel safe in your environment? Yes    Past Medical History:   Diagnosis Date     Diabetic retinopathy associated with type 1 diabetes mellitus (H)     proliferative DR and bilateral laser PC surgeries     Hyperlipidemia LDL goal < 130      RA (rheumatoid arthritis) (H)     seeing rheumatologist.     Sjoegren syndrome     seeing rheumatologist.     Type 1 diabetes mellitus (H)     retinopathy, neuropathy     Visual impairment      Past Surgical History:   Procedure Laterality Date     C NONSPECIFIC PROCEDURE  8/00/97    T & A     C NONSPECIFIC PROCEDURE  5/00/99    L eye surgery     C NONSPECIFIC PROCEDURE  4/10/01    R cataract + IOL     C NONSPECIFIC PROCEDURE  2002    bilat vitrectomy     C NONSPECIFIC PROCEDURE  7/04    R eye vitrectomy     C NONSPECIFIC PROCEDURE  5/08    R vitrectomy & partial retinal detachment          Current Outpatient Medications   Medication Sig Dispense Refill     ACE/ARB NOT PRESCRIBED, INTENTIONAL, 1  each by Other route continuous prn.       ASPIRIN 81 MG OR TABS ONE DAILY 100 3     blood glucose (EDWIN CONTOUR) test strip 1 strip by In Vitro route 4 times daily (before meals and nightly) Checks 4-5 x daily 400 strip 3     blood glucose (NO BRAND SPECIFIED) test strip Use to test blood sugar 4 times daily or as directed, Accu-check Guide test strips, E10.9. 400 strip 0     blood glucose monitoring (NO BRAND SPECIFIED) meter device kit Use to test blood sugar 5 times daily or as directed, Accu-check Guide meter. 1 kit 1     blood glucose monitoring (NO BRAND SPECIFIED) test strip Use to test blood sugar 4 times daily or as directed, Accu-check Guide meter test strips 400 strip 3     brimonidine (ALPHAGAN) 0.2 % ophthalmic solution INT 1 GTT IN OU BID  3     continuous blood glucose monitoring (Virtual Event BagsSTYLE LOUIS) sensor For use with Freestyle Louis Flash  for continuous monitioring of blood glucose levels. Replace sensor every 14 days. 2 each 11     cycloSPORINE (RESTASIS) 0.05 % ophthalmic emulsion Place 1 drop into both eyes 2 times daily       diclofenac (VOLTAREN) 75 MG EC tablet TK 1 T PO BID  2     DICLOFENAC PO Take by mouth 2 times daily       dorzolamide-timolol (COSOPT) 2-0.5 % ophthalmic solution INSTILL 1 GTT IN OU QAM  6     FOLIC ACID PO Take 1 mg by mouth daily       InFLIXimab (REMICADE IV) Inject 300 mg into the vein Every 2 months       insulin aspart (FIASP FLEXTOUCH) 100 UNIT/ML pen-injector INJECT 3 TO 8 UNITS PER MEAL AS DIRECTED 15 mL 5     insulin glargine (LANTUS SOLOSTAR PEN) 100 UNIT/ML pen Inject 22-24 units subcutaneous daily as directed 15 mL 11     insulin pen needle (BD ELIUD U/F) 32G X 4 MM miscellaneous Use 4 pen needles daily or as directed. 150 each 11     losartan (COZAAR) 25 MG tablet Take 1 tablet (25 mg) by mouth daily 30 tablet 11     Methotrexate Sodium (METHOTREXATE PO) Take by mouth once a week 7 tablets once a week       Omega-3 Fatty Acids (FISH OIL TRIPLE  STRENGTH) 1400 MG CAPS Take 2 tablets by mouth daily.       ONE DAILY MULTIPLE VITAMIN OR TABS one daily  0     predniSONE (DELTASONE) 5 MG tablet TK 1 T PO QAM  0     simvastatin (ZOCOR) 40 MG tablet TAKE 1 TABLET BY MOUTH AT BEDTIME TO LOWER CHOLESTEROL 90 tablet 0     sulfaSALAzine (AZULFIDINE) 500 MG tablet Take 500 mg by mouth 2 times daily 2 tablets twice daily       ValACYclovir (VALTREX) 500 MG tablet Take 1 tablet by mouth daily. 14 tablet prn     MALKA 28 3-0.03 MG OR TABS 1 TABLET DAILY 3 months prn       Family History   Problem Relation Age of Onset     Arthritis Mother         on celebrex,alive & healthy 2002     Breast Cancer Mother      Arthritis Father         on celebrex(2002)     Heart Disease Father         heart attack 1990, 60 years old (2002)     Diabetes Father      Cerebrovascular Disease Father         age 70     Thyroid Disease Sister         on thyroid med for couple of years , 35 yrs now(2002)     Cancer Paternal Grandmother         STOMACH     Cerebrovascular Disease Paternal Grandfather         age 90         Social History     Tobacco Use     Smoking status: Never Smoker     Smokeless tobacco: Never Used   Substance Use Topics     Alcohol use: Yes     Comment: 1 drink every 3 weeks     If you drink alcohol do you typically have >3 drinks per day or >7 drinks per week? No    Alcohol Use 11/8/2019   Prescreen: >3 drinks/day or >7 drinks/week? No   Prescreen: >3 drinks/day or >7 drinks/week? -   No flowsheet data found.    Reviewed orders with patient.  Reviewed health maintenance and updated orders accordingly - Yes       Pertinent mammograms are reviewed under the imaging tab.  History of abnormal Pap smear: NO - age 30-65 PAP every 5 years with negative HPV co-testing recommended     Reviewed and updated as needed this visit by clinical staff  Tobacco  Allergies  Meds  Med Hx  Surg Hx  Fam Hx  Soc Hx        Reviewed and updated as needed this visit by Provider            Review  "of Systems   Constitutional: Negative for chills and fever.   HENT: Negative for congestion and ear pain.    Eyes: Negative for pain.   Respiratory: Negative for cough.    Cardiovascular: Negative for chest pain.   Gastrointestinal: Negative for abdominal pain, constipation, diarrhea and hematochezia.   Genitourinary: Negative for frequency and hematuria.   Neurological: Negative for dizziness.   Psychiatric/Behavioral: The patient is not nervous/anxious.          OBJECTIVE:   /78   Pulse 104   Temp 98.3  F (36.8  C) (Oral)   Resp 19   Ht 1.613 m (5' 3.5\")   Wt 57.5 kg (126 lb 11.2 oz)   LMP 10/16/2019   SpO2 97%   BMI 22.09 kg/m    Physical Exam  GENERAL: healthy, alert and no distress  EYES: Eyes grossly normal to inspection, PERRL and conjunctivae and sclerae normal  HENT: ear canals and TM's normal, nose and mouth without ulcers or lesions  NECK: no adenopathy, no asymmetry, masses, or scars and thyroid normal to palpation  RESP: lungs clear to auscultation - no rales, rhonchi or wheezes  BREAST: normal without masses, tenderness or nipple discharge and no palpable axillary masses or adenopathy  CV: regular rate and rhythm, normal S1 S2, no S3 or S4, no murmur, click or rub, no peripheral edema and peripheral pulses strong  ABDOMEN: soft, nontender, no hepatosplenomegaly, no masses and bowel sounds normal  MS: no gross musculoskeletal defects noted, no edema  NEURO: Normal strength and tone, mentation intact and speech normal  PSYCH: mentation appears normal, affect normal/bright       ASSESSMENT/PLAN:     (Z00.00) Routine history and physical examination of adult  (primary encounter diagnosis)  Plan: Hemoglobin, Comprehensive metabolic panel,         Lipid panel reflex to direct LDL Non-fasting,                   (E78.5) Hyperlipidemia LDL goal <100  Plan: simvastatin (ZOCOR) 40 MG tablet refilled.explained clearly about the medication,insructions and side effects. Lipid panel         reflex to " "direct LDL Non-fasting,        (E10.39) Type 1 diabetes mellitus with other ophthalmic complication (H)  Plan: Patient sees endocrinologist , check TSH with free T4 reflex, Hemoglobin A1c,         Albumin Random Urine Quantitative with Creat         Ratio            (D89.9) Immunosuppressed status (H)  (M06.9) Rheumatoid arthritis, involving unspecified site, unspecified rheumatoid factor presence (H)  Plan: Seeing rheumatologist and on methotrexate          COUNSELING:  Reviewed preventive health counseling, as reflected in patient instructions       Regular exercise       Healthy diet/nutrition    Estimated body mass index is 22.32 kg/m  as calculated from the following:    Height as of 11/5/19: 1.613 m (5' 3.5\").    Weight as of 11/5/19: 58.1 kg (128 lb).         reports that she has never smoked. She has never used smokeless tobacco.      Counseling Resources:  ATP IV Guidelines  Pooled Cohorts Equation Calculator  Breast Cancer Risk Calculator  FRAX Risk Assessment  ICSI Preventive Guidelines  Dietary Guidelines for Americans, 2010  USDA's MyPlate  ASA Prophylaxis  Lung CA Screening    Calos Melendez MD  [facility]  "

## 2019-11-09 LAB
CHOLEST SERPL-MCNC: 190 MG/DL
CREAT UR-MCNC: 158 MG/DL
HDLC SERPL-MCNC: 76 MG/DL
LDLC SERPL CALC-MCNC: 90 MG/DL
MICROALBUMIN UR-MCNC: 25 MG/L
MICROALBUMIN/CREAT UR: 15.76 MG/G CR (ref 0–25)
NONHDLC SERPL-MCNC: 114 MG/DL
TRIGL SERPL-MCNC: 120 MG/DL

## 2019-11-19 ENCOUNTER — TELEPHONE (OUTPATIENT)
Dept: ENDOCRINOLOGY | Facility: CLINIC | Age: 49
End: 2019-11-19

## 2019-11-19 NOTE — TELEPHONE ENCOUNTER
Reason for Call:  Other prescription    Detailed comments: patient got a letter from insurance stating the Freestyle LibrePersonal sensor rx had been approved.  Her letter is dated 11/15/19.  She wanted to confirm, and also check on pharmacy  She had gotten notified by [facility] that the rx was there and they wanted a credit card # in case of copay.  Per 10/30 notes, it was sent there and I let her know that.   FYI    Phone Number Patient can be reached at: Cell number on file:    Telephone Information:   Mobile [phone]       Best Time:     Can we leave a detailed message on this number?     Call taken on 11/19/2019 at 3:31 PM by Yareli Archibald

## 2019-12-01 DIAGNOSIS — E78.5 HYPERLIPIDEMIA LDL GOAL <100: ICD-10-CM

## 2019-12-02 NOTE — TELEPHONE ENCOUNTER
"Requested Prescriptions   Pending Prescriptions Disp Refills     simvastatin (ZOCOR) 40 MG tablet [Pharmacy Med Name: SIMVASTATIN 40MG TABLETS] 90 tablet 0     Sig: TAKE 1 TABLET BY MOUTH AT BEDTIME TO LOWER CHOLESTEROL   Last Written Prescription Date:  11/08/2019  Last Fill Quantity: 90,  # refills: 03   Last office visit: 11/8/2019 with prescribing provider:     Future Office Visit:      [facility]ins Protocol Passed - 12/1/2019  9:40 AM        Passed - LDL on file in past 12 months     Recent Labs   Lab Test 11/08/19  1040   LDL 90             Passed - No abnormal creatine kinase in past 12 months     No lab results found.             Passed - Recent (12 mo) or future (30 days) visit within the authorizing provider's specialty     Patient has had an office visit with the authorizing provider or a provider within the authorizing providers department within the previous 12 mos or has a future within next 30 days. See \"Patient Info\" tab in inbasket, or \"Choose Columns\" in Meds & Orders section of the refill encounter.              Passed - Medication is active on med list        Passed - Patient is age 18 or older        Passed - No active pregnancy on record        Passed - No positive pregnancy test in past 12 months        "

## 2019-12-03 RX ORDER — SIMVASTATIN 40 MG
TABLET ORAL
Qty: 90 TABLET | Refills: 0 | OUTPATIENT
Start: 2019-12-03

## 2019-12-17 ENCOUNTER — TRANSFERRED RECORDS (OUTPATIENT)
Dept: HEALTH INFORMATION MANAGEMENT | Facility: CLINIC | Age: 49
End: 2019-12-17

## 2020-01-31 ENCOUNTER — TRANSFERRED RECORDS (OUTPATIENT)
Dept: HEALTH INFORMATION MANAGEMENT | Facility: CLINIC | Age: 50
End: 2020-01-31

## 2020-02-14 ENCOUNTER — TELEPHONE (OUTPATIENT)
Dept: ENDOCRINOLOGY | Facility: CLINIC | Age: 50
End: 2020-02-14

## 2020-02-14 NOTE — TELEPHONE ENCOUNTER
MEDICAL    PRIOR AUTHORIZATION REQUIRED - See Reason for Call Comments for specific products. Billing with (K) codes.    INSURANCE:   ID:           [facility] TEAM  [phone]    Route determinations back to [facility] Diabetes pool ([id number])

## 2020-02-18 NOTE — TELEPHONE ENCOUNTER
PA Initiation    Medication: FREESTYLE LOUIS 14 DAY SENSORS -   Insurance Company: [employer] - Phone [phone] Fax [phone]  Pharmacy Filling the Rx: [facility] - [place], [place] - [facility] [address]  Filling Pharmacy Phone:    Filling Pharmacy Fax:    Start Date: 2/18/2020

## 2020-02-24 NOTE — TELEPHONE ENCOUNTER
Prior Authorization Approval    Medication: FREESTYLE LOUIS 14 DAY SENSORS - APPROVED was approved on 2/19/2020  Effective: 2/18/2020 to 8/15/2020  Reference #:    Approved Dose/Quantity:   Insurance Company: [employer] - Phone [phone] Fax [phone]  Expected CoPay:    Pharmacy Filling the Rx: [facility] - [place] [id number]  Pharmacy Notified: Yes  Patient Notified: Comment:  **Instructed pharmacy to notify patient when script is ready to /ship.**

## 2020-03-22 ENCOUNTER — HEALTH MAINTENANCE LETTER (OUTPATIENT)
Age: 50
End: 2020-03-22

## 2020-04-21 ENCOUNTER — TRANSFERRED RECORDS (OUTPATIENT)
Dept: HEALTH INFORMATION MANAGEMENT | Facility: CLINIC | Age: 50
End: 2020-04-21

## 2020-04-21 LAB
ALT SERPL-CCNC: 16 IU/L (ref 5–35)
AST SERPL-CCNC: 18 U/L (ref 5–34)
CREAT SERPL-MCNC: 0.75 MG/DL (ref 0.5–1.3)
GFR SERPL CREATININE-BSD FRML MDRD: 87.3 ML/MIN/1.73M2

## 2020-04-27 DIAGNOSIS — E10.3599 TYPE 1 DIABETES MELLITUS WITH PROLIFERATIVE RETINOPATHY, MACULAR EDEMA PRESENCE UNSPECIFIED, UNSPECIFIED LATERALITY, UNSPECIFIED PROLIFERATIVE RETINOPATHY TYPE (H): ICD-10-CM

## 2020-04-27 DIAGNOSIS — I10 ESSENTIAL HYPERTENSION: ICD-10-CM

## 2020-04-29 RX ORDER — LOSARTAN POTASSIUM 25 MG/1
TABLET ORAL
Qty: 30 TABLET | Refills: 6 | Status: SHIPPED | OUTPATIENT
Start: 2020-04-29 | End: 2020-11-27

## 2020-04-29 NOTE — TELEPHONE ENCOUNTER
Prescription approved per [facility] Refill Protocol.  Karmen Griggs RN on 4/29/2020 at 10:16 AM

## 2020-08-25 ENCOUNTER — TRANSFERRED RECORDS (OUTPATIENT)
Dept: HEALTH INFORMATION MANAGEMENT | Facility: CLINIC | Age: 50
End: 2020-08-25

## 2020-08-25 LAB
ALT SERPL-CCNC: 28 IU/L (ref 5–35)
AST SERPL-CCNC: 32 U/L (ref 5–34)
CREAT SERPL-MCNC: 0.86 MG/DL (ref 0.5–1.3)
HEP C HIM: NORMAL

## 2020-09-14 ENCOUNTER — VIRTUAL VISIT (OUTPATIENT)
Dept: ENDOCRINOLOGY | Facility: CLINIC | Age: 50
End: 2020-09-14
Payer: COMMERCIAL

## 2020-09-14 DIAGNOSIS — E10.40 TYPE 1 DIABETES MELLITUS WITH DIABETIC NEUROPATHY (H): Primary | ICD-10-CM

## 2020-09-14 DIAGNOSIS — E10.3599 TYPE 1 DIABETES MELLITUS WITH PROLIFERATIVE RETINOPATHY, MACULAR EDEMA PRESENCE UNSPECIFIED, UNSPECIFIED LATERALITY, UNSPECIFIED PROLIFERATIVE RETINOPATHY TYPE (H): ICD-10-CM

## 2020-09-14 PROCEDURE — 99214 OFFICE O/P EST MOD 30 MIN: CPT | Mod: 95 | Performed by: INTERNAL MEDICINE

## 2020-09-14 RX ORDER — FLASH GLUCOSE SENSOR
KIT MISCELLANEOUS
Qty: 2 EACH | Refills: 11 | Status: CANCELLED | OUTPATIENT
Start: 2020-09-14

## 2020-09-14 NOTE — PROGRESS NOTES
"Perlita Chavez is a 49 year old female who is being evaluated via a billable video visit.      The patient has been notified of following:     \"This video visit will be conducted via a call between you and your physician/provider. We have found that certain health care needs can be provided without the need for an in-person physical exam.  This service lets us provide the care you need with a video conversation.  If a prescription is necessary we can send it directly to your pharmacy.  If lab work is needed we can place an order for that and you can then stop by our lab to have the test done at a later time.    Video visits are billed at different rates depending on your insurance coverage.  Please reach out to your insurance provider with any questions.    If during the course of the call the physician/provider feels a video visit is not appropriate, you will not be charged for this service.\"    Patient has given verbal consent for Video visit? Yes  How would you like to obtain your AVS? Verbally Reviewed  If you are dropped from the video visit, the video invite should be resent to: Text to cell phone: [phone]  Will anyone else be joining your video visit? No      Recent issues:  Diabetes follow-up  She has been furloughed since 4/2020 from her [employer] job  Still using the Freestyle Mary CGMS sensor, loves it  Restarted going to gym since 6/11/20, then noticing improved insulin sensitivity but some low's             1972. Diagnosis of diabetes mellitus age 2 1/2, living in [facility]  Initial treatment with NPH and Regular insulin medications  Had seen Dr. Sarah Sierra/ Endocrinology  Switched to MDI insulin plan using Nv and Levemir insulin     8/19/14. Initial consult with me at my former clinic ([facility])  8/20/14. CDE RD evaluation, also tried demo OmniPod pump  Used Novolog Echo 1/2U pen, with I:C carb counting method  Tried Lantus BID dosing, then Tresiba (expensive), then Basaglar              " Previously used Novolog insulin  5/2018. Switched to Fiasp 3/2018, then Fiasp non-formulary 2019  Meals typically brunch 10am and supper 9:30pm  Current DM meds:  Lantus Solostar                       18U each morning  Fiasp Flextouch                      1:15 ratio (typically 6-12 per meal)                                                              sscale 1U per 40>150                                                              Target  mg/dl     Using Accucheck Guide BG meter              Tests 4x/day              Does own I:C and ISF calculations  9/2019. She has worn a free sample LibrePersonal continuous glucose sensor              Subsequent Mary Rx denied by her insurance plan              Denial appeal submitted, results pending  Exercises with gym workouts, less often  Previous [facility] labs include:  Lab Results   Component Value Date    A1C 7.6 (H) 11/08/2019     11/08/2019    POTASSIUM 3.7 11/08/2019    CHLORIDE 105 11/08/2019    CO2 26 11/08/2019    ANIONGAP 6 11/08/2019    GLC 89 11/08/2019    BUN 21 11/08/2019    CR 0.860 08/25/2020    GFRESTIMATED 87.3 04/21/2020    GFRESTBLACK >90 11/08/2019    FINESSE 8.8 11/08/2019    CHOL 190 11/08/2019    TRIG 120 11/08/2019    HDL 76 11/08/2019    LDL 90 11/08/2019    NHDL 114 11/08/2019    UCRR 158 11/08/2019    MICROL 25 11/08/2019    UMALCR 15.76 11/08/2019     No history of vascular disease.  Takes simvastatin for hyperlipidemia management.  DM Complications:              Retinopathy                          Previous proliferative DR and bilateral laser PC surgeries                          Had cataracts, retinal detachment repair OD, higher occular pressures                          Significant vision loss, needs print magnification                          Sees Fer Davison and EBER Trevizo/ophthalmology, last eye exam with Dr. 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 Humberto Melendez/ [facility] for general medicine evaluations.  Also sees Dr. Blanco/[facility]          ROS: 10 point ROS neg other than the symptoms noted above in the HPI.     GENERAL: some fatigue; weight stable; denies fevers, chills, night sweats.   HEENT: minimal/no vision from right eye, dry eyes/mouth; no dysphagia, odonophagia, diplopia  THYROID:  no apparent hyper or hypothyroid symptoms  CV: no chest pain, pressure, palpitations  LUNGS: no SOB, FUENTES, cough, wheezing   ABDOMEN: rare morning nausea; no diarrhea, constipation, abdominal pain  EXTREMITIES: no rashes, ulcers, edema  NEUROLOGY: decreased sensation at feet; no headaches, denies changes in vision, tingling, extremitiy numbness   MSK: mild hand tendinitis pains; no muscle aches, weakness  SKIN: denies rashes or lesions/foot sores  :  menses regular on Kristi OCP med  PSYCH:  stable mood, no significant anxiety or depression  ENDOCRINE: no heat or cold intolerance     Physical Exam (visual exam)  VS:  no vital signs taken for video visit  GENERAL: healthy, alert and NAD, well dressed, answering questions appropriately  EYES: eyes grossly normal to inspection, conjunctivae and sclerae normal, no exophthalmos or proptosis  THYROID:  no apparent nodules or goiter  LUNGS: no audible wheeze, cough or visible cyanosis, no visible retractions or increased work of breathing  ABDOMEN: abdomen not evaluated  EXTREMITIES: no hand tremors, limited exam  NEUROLOGY: CN grossly intact, mentation intact and speech normal   PSYCH: mentation appears normal, affect normal/bright, judgement and insight intact, normal speech and appearance well groomed       LABS:     All pertinent notes, labs, and images personally reviewed by me.      A/P:       Encounter Diagnoses   Name      Type 1 diabetes mellitus with proliferative retinopathy, macular edema presence unspecified,  unspecified laterality, unspecified proliferative retinopathy type (H)      Type 1 diabetes mellitus with diabetic neuropathy (H)           Comments:  Reviewed the diabetes and health history     Plan:  Discussed general issues with the diabetes diagnosis and managemen  We discussed the hgbA1c test which reflects previous overall glucose levels or control  Discussed the importance of blood glucose (BG) testing to assess glucose trends  Discussed use of the Accu-check Guide BG meter  Provided general overview of the multiple daily injection (MDI) plan using rapid acting mealtime               and longacting insulin medications     Recommend:  Continue current insulin treatment and diabetes management, with Fiasp mealtime & Lantus              basal insulin pen use  Reviewed dose strategy with premeal or presnack, also correction dose Fiasp insulin use   Goal target premeal glucose 100-150 mg/dl  Plan non-fasting lab appt soon   Discussed using our [facility]   Lab orders placed  Reviewed glucose testing issues:              Limitations with her current fingertip blood glucose meter testing with:                          Time required for work-breaks and fingertip testing in private space                          Less frequent testing                          Difficulty reading font on BG meter              Advantages of using a CGMS device such as the Freestyle LibrePersonal or Libre2 sensor:                          Less time required for obtaining glucose result, improved frequency of testing                          Able to scan sensor through her work clothing                          Glucose trend information to assess premeal/postmeal                                       hyperglycemia/hypoglycemia                          Glucose trend evaluation after exercise    Glucose alarms for high and low levels                          Expect less hypoglycemia and overall morbidity, less risk of  hospitalization with                                       the Type 1 diabetes management   I will set aside a sample Libre2 sensor and [place] for her pickup from our [facility] office, also sent Libre2 sensor Rx to pharmacy today        Continue the losartan daily dose  Continue current simvastatin lipid medication   Advise having fasting lipid panel testing at least annually  Keep regular f/u ophthalmology evaluation appointments     Contact PCP regarding the anxiety issues, management  Addressed patient questions today        More than 50% of the time spent with Ms. Chavez on counseling / coordinating her care.  Total appointment time was 25 minutes.     Follow-up:  12/2020     TI Lowe MD, MS  Endocrinology  [facility]      Video-Visit Details    Type of service:  Video Visit    Video Start Time: 11:05 am  Video End Time: 11:30 am    Originating Location (pt. Location): home    Distant Location (provider location):  [facility]    Platform used for Video Visit: [employer]

## 2020-09-14 NOTE — LETTER
"    9/14/2020         RE: Perlita Chavez  [address] [place] [facility] [place] [address]        Dear Colleague,    Thank you for referring your patient, Perlita Chavez, to the [facility]. Please see a copy of my visit note below.    Perlita Chavez is a 49 year old female who is being evaluated via a billable video visit.      The patient has been notified of following:     \"This video visit will be conducted via a call between you and your physician/provider. We have found that certain health care needs can be provided without the need for an in-person physical exam.  This service lets us provide the care you need with a video conversation.  If a prescription is necessary we can send it directly to your pharmacy.  If lab work is needed we can place an order for that and you can then stop by our lab to have the test done at a later time.    Video visits are billed at different rates depending on your insurance coverage.  Please reach out to your insurance provider with any questions.    If during the course of the call the physician/provider feels a video visit is not appropriate, you will not be charged for this service.\"    Patient has given verbal consent for Video visit? Yes  How would you like to obtain your AVS? Verbally Reviewed  If you are dropped from the video visit, the video invite should be resent to: Text to cell phone: [phone]  Will anyone else be joining your video visit? No      Recent issues:  Diabetes follow-up  She has been furloughed since 4/2020 from her [employer] job  Still using the Freestyle Mary CGMS sensor, loves it  Restarted going to gym since 6/11/20, then noticing improved insulin sensitivity but some low's             1972. Diagnosis of diabetes mellitus age 2 1/2, living in [facility]  Initial treatment with NPH and Regular insulin medications  Had seen Dr. Sarah Sierra/ Endocrinology  Switched to MDI insulin plan using Nv and Levemir insulin     8/19/14. Initial " consult with me at my former clinic ([facility])  8/20/14. CDE RD evaluation, also tried demo OmniPod pump  Used Novolog Echo 1/2U pen, with I:C carb counting method  Tried Lantus BID dosing, then Tresiba (expensive), then Basaglar              Previously used Novolog insulin  5/2018. Switched to Fiasp 3/2018, then Fiasp non-formulary 2019  Meals typically brunch 10am and supper 9:30pm  Current DM meds:  Lantus Solostar                       18U each morning  Fiasp Flextouch                      1:15 ratio (typically 6-12 per meal)                                                              sscale 1U per 40>150                                                              Target  mg/dl     Using Accucheck Guide BG meter              Tests 4x/day              Does own I:C and ISF calculations  9/2019. She has worn a free sample LibrePersonal continuous glucose sensor              Subsequent Mary Rx denied by her insurance plan              Denial appeal submitted, results pending  Exercises with gym workouts, less often  Previous [facility] labs include:  Lab Results   Component Value Date    A1C 7.6 (H) 11/08/2019     11/08/2019    POTASSIUM 3.7 11/08/2019    CHLORIDE 105 11/08/2019    CO2 26 11/08/2019    ANIONGAP 6 11/08/2019    GLC 89 11/08/2019    BUN 21 11/08/2019    CR 0.860 08/25/2020    GFRESTIMATED 87.3 04/21/2020    GFRESTBLACK >90 11/08/2019    FINESSE 8.8 11/08/2019    CHOL 190 11/08/2019    TRIG 120 11/08/2019    HDL 76 11/08/2019    LDL 90 11/08/2019    NHDL 114 11/08/2019    UCRR 158 11/08/2019    MICROL 25 11/08/2019    UMALCR 15.76 11/08/2019     No history of vascular disease.  Takes simvastatin for hyperlipidemia management.  DM Complications:              Retinopathy                          Previous proliferative DR and bilateral laser PC surgeries                          Had cataracts, retinal detachment repair OD, higher occular pressures                          Significant vision loss, needs  print magnification                          Sees Fer Davison and EBER Trevizo/ophthalmology, last eye exam with Dr. 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 Humberto Melendez/[facility] for general medicine evaluations.  Also sees Dr. Blanco/[facility] rheumatology          ROS: 10 point ROS neg other than the symptoms noted above in the HPI.     GENERAL: some fatigue; weight stable; denies fevers, chills, night sweats.   HEENT: minimal/no vision from right eye, dry eyes/mouth; no dysphagia, odonophagia, diplopia  THYROID:  no apparent hyper or hypothyroid symptoms  CV: no chest pain, pressure, palpitations  LUNGS: no SOB, FUENTES, cough, wheezing   ABDOMEN: rare morning nausea; no diarrhea, constipation, abdominal pain  EXTREMITIES: no rashes, ulcers, edema  NEUROLOGY: decreased sensation at feet; no headaches, denies changes in vision, tingling, extremitiy numbness   MSK: mild hand tendinitis pains; no muscle aches, weakness  SKIN: denies rashes or lesions/foot sores  :  menses regular on Kristi OCP med  PSYCH:  stable mood, no significant anxiety or depression  ENDOCRINE: no heat or cold intolerance     Physical Exam (visual exam)  VS:  no vital signs taken for video visit  GENERAL: healthy, alert and NAD, well dressed, answering questions appropriately  EYES: eyes grossly normal to inspection, conjunctivae and sclerae normal, no exophthalmos or proptosis  THYROID:  no apparent nodules or goiter  LUNGS: no audible wheeze, cough or visible cyanosis, no visible retractions or increased work of breathing  ABDOMEN: abdomen not evaluated  EXTREMITIES: no hand tremors, limited exam  NEUROLOGY: CN grossly intact, mentation intact and speech normal   PSYCH: mentation appears normal, affect normal/bright, judgement and insight intact, normal speech and appearance  well groomed       LABS:     All pertinent notes, labs, and images personally reviewed by me.      A/P:       Encounter Diagnoses   Name      Type 1 diabetes mellitus with proliferative retinopathy, macular edema presence unspecified, unspecified laterality, unspecified proliferative retinopathy type (H)      Type 1 diabetes mellitus with diabetic neuropathy (H)           Comments:  Reviewed the diabetes and health history     Plan:  Discussed general issues with the diabetes diagnosis and managemen  We discussed the hgbA1c test which reflects previous overall glucose levels or control  Discussed the importance of blood glucose (BG) testing to assess glucose trends  Discussed use of the Accu-check Guide BG meter  Provided general overview of the multiple daily injection (MDI) plan using rapid acting mealtime               and longacting insulin medications     Recommend:  Continue current insulin treatment and diabetes management, with Fiasp mealtime & Lantus              basal insulin pen use  Reviewed dose strategy with premeal or presnack, also correction dose Fiasp insulin use   Goal target premeal glucose 100-150 mg/dl  Plan non-fasting lab appt soon   Discussed using our [facility] clinic [facility]   Lab orders placed  Reviewed glucose testing issues:              Limitations with her current fingertip blood glucose meter testing with:                          Time required for work-breaks and fingertip testing in private space                          Less frequent testing                          Difficulty reading font on BG meter              Advantages of using a CGMS device such as the Freestyle LibrePersonal or Libre2 sensor:                          Less time required for obtaining glucose result, improved frequency of testing                          Able to scan sensor through her work clothing                          Glucose trend information to assess premeal/postmeal                                        hyperglycemia/hypoglycemia                          Glucose trend evaluation after exercise    Glucose alarms for high and low levels                          Expect less hypoglycemia and overall morbidity, less risk of hospitalization with                                       the Type 1 diabetes management   I will set aside a sample Libre2 sensor and [place] for her pickup from our [facility] office, also sent Libre2 sensor Rx to pharmacy today        Continue the losartan daily dose  Continue current simvastatin lipid medication   Advise having fasting lipid panel testing at least annually  Keep regular f/u ophthalmology evaluation appointments     Contact PCP regarding the anxiety issues, management  Addressed patient questions today        More than 50% of the time spent with Ms. Chavez on counseling / coordinating her care.  Total appointment time was 25 minutes.     Follow-up:  12/2020     TI Lowe MD, MS  Endocrinology  [facility]      Video-Visit Details    Type of service:  Video Visit    Video Start Time: 11:05 am  Video End Time: 11:30 am    Originating Location (pt. Location): [place]    Distant Location (provider location):  [facility]    Platform used for Video Visit: [facility]          Again, thank you for allowing me to participate in the care of your patient.        Sincerely,        Saurabh Lowe MD

## 2020-09-16 DIAGNOSIS — E10.40 TYPE 1 DIABETES MELLITUS WITH DIABETIC NEUROPATHY (H): ICD-10-CM

## 2020-09-16 DIAGNOSIS — E10.3599 TYPE 1 DIABETES MELLITUS WITH PROLIFERATIVE RETINOPATHY, MACULAR EDEMA PRESENCE UNSPECIFIED, UNSPECIFIED LATERALITY, UNSPECIFIED PROLIFERATIVE RETINOPATHY TYPE (H): ICD-10-CM

## 2020-09-16 LAB — HBA1C MFR BLD: 7.3 % (ref 0–5.6)

## 2020-09-16 PROCEDURE — 84443 ASSAY THYROID STIM HORMONE: CPT | Performed by: INTERNAL MEDICINE

## 2020-09-16 PROCEDURE — 82043 UR ALBUMIN QUANTITATIVE: CPT | Performed by: INTERNAL MEDICINE

## 2020-09-16 PROCEDURE — 83036 HEMOGLOBIN GLYCOSYLATED A1C: CPT | Performed by: INTERNAL MEDICINE

## 2020-09-16 PROCEDURE — 80048 BASIC METABOLIC PNL TOTAL CA: CPT | Performed by: INTERNAL MEDICINE

## 2020-09-16 PROCEDURE — 36415 COLL VENOUS BLD VENIPUNCTURE: CPT | Performed by: INTERNAL MEDICINE

## 2020-09-17 LAB
ANION GAP SERPL CALCULATED.3IONS-SCNC: 8 MMOL/L (ref 3–14)
BUN SERPL-MCNC: 16 MG/DL (ref 7–30)
CALCIUM SERPL-MCNC: 8.9 MG/DL (ref 8.5–10.1)
CHLORIDE SERPL-SCNC: 107 MMOL/L (ref 94–109)
CO2 SERPL-SCNC: 23 MMOL/L (ref 20–32)
CREAT SERPL-MCNC: 0.83 MG/DL (ref 0.52–1.04)
CREAT UR-MCNC: 256 MG/DL
GFR SERPL CREATININE-BSD FRML MDRD: 82 ML/MIN/{1.73_M2}
GLUCOSE SERPL-MCNC: 157 MG/DL (ref 70–99)
MICROALBUMIN UR-MCNC: 17 MG/L
MICROALBUMIN/CREAT UR: 6.56 MG/G CR (ref 0–25)
POTASSIUM SERPL-SCNC: 4.1 MMOL/L (ref 3.4–5.3)
SODIUM SERPL-SCNC: 138 MMOL/L (ref 133–144)
TSH SERPL DL<=0.005 MIU/L-ACNC: 3.85 MU/L (ref 0.4–4)

## 2020-09-25 ENCOUNTER — TRANSFERRED RECORDS (OUTPATIENT)
Dept: HEALTH INFORMATION MANAGEMENT | Facility: CLINIC | Age: 50
End: 2020-09-25

## 2020-09-25 LAB — RETINOPATHY: NEGATIVE

## 2020-11-02 ENCOUNTER — TELEPHONE (OUTPATIENT)
Dept: INTERNAL MEDICINE | Facility: CLINIC | Age: 50
End: 2020-11-02
Payer: COMMERCIAL

## 2020-11-02 ENCOUNTER — TELEPHONE (OUTPATIENT)
Dept: ENDOCRINOLOGY | Facility: CLINIC | Age: 50
End: 2020-11-02

## 2020-11-02 DIAGNOSIS — E10.3559 TYPE 1 DIABETES MELLITUS WITH STABLE PROLIFERATIVE RETINOPATHY, UNSPECIFIED LATERALITY (H): Primary | ICD-10-CM

## 2020-11-02 DIAGNOSIS — Z53.9 ERRONEOUS ENCOUNTER--DISREGARD: Primary | ICD-10-CM

## 2020-11-02 NOTE — TELEPHONE ENCOUNTER
Fiasp Flextouch pens are not covered by insurance policy. Formulary reviewed and medications that are covered by policy are listed below. Humalog vials are covered and Novolog vials and pens are the preferred alternatives.    Shlomo Nelson, CMA on 11/2/2020 at 11:33 AM

## 2020-11-09 ENCOUNTER — TELEPHONE (OUTPATIENT)
Dept: FAMILY MEDICINE | Facility: CLINIC | Age: 50
End: 2020-11-09

## 2020-11-09 DIAGNOSIS — E10.3599 TYPE 1 DIABETES MELLITUS WITH PROLIFERATIVE RETINOPATHY, MACULAR EDEMA PRESENCE UNSPECIFIED, UNSPECIFIED LATERALITY, UNSPECIFIED PROLIFERATIVE RETINOPATHY TYPE (H): ICD-10-CM

## 2020-11-09 NOTE — TELEPHONE ENCOUNTER
Reason for Call: Request for an order or referral:    Order or referral being requested: blood sugar device    Date needed: at your convenience    Has the patient been seen by the PCP for this problem? YES-Pt stated that Pt and  have talk about the device this past Saturday that it will be at the  to be .     Additional comments: Pt will like a call back when and what time to be .     Phone number Patient can be reached at:  Cell number on file:    Telephone Information:   Mobile [phone]       Best Time:  Anytime     Can we leave a detailed message on this number?  YES    Call taken on 11/9/2020 at 2:53 PM by Rachelle Menard

## 2020-11-10 RX ORDER — PEN NEEDLE, DIABETIC 32GX 5/32"
NEEDLE, DISPOSABLE MISCELLANEOUS
Qty: 400 EACH | Refills: 0 | Status: SHIPPED | OUTPATIENT
Start: 2020-11-10 | End: 2022-07-12

## 2020-11-11 NOTE — TELEPHONE ENCOUNTER
Dr. Lowe, do you know if there was anything left for the patient? Please advise.     Lauren Tafoya, CMA

## 2020-11-14 NOTE — TELEPHONE ENCOUNTER
Message noted, called patient.  I have checked with our PA team and learned the Freestyle Libre2 appeal is still pending.  I offered patient (2) sample 14-day Freestyle Mary sensors available from our clinic and she agreed... sensors left for her pickup at our [facility] #150 .  She thanked us for our assistance.    TI Lowe MD, MS  [facility]

## 2020-11-19 ENCOUNTER — TELEPHONE (OUTPATIENT)
Dept: ENDOCRINOLOGY | Facility: CLINIC | Age: 50
End: 2020-11-19

## 2020-11-19 DIAGNOSIS — E10.3599 TYPE 1 DIABETES MELLITUS WITH PROLIFERATIVE RETINOPATHY, MACULAR EDEMA PRESENCE UNSPECIFIED, UNSPECIFIED LATERALITY, UNSPECIFIED PROLIFERATIVE RETINOPATHY TYPE (H): ICD-10-CM

## 2020-11-19 NOTE — TELEPHONE ENCOUNTER
----- Message from Saurabh Lowe MD sent at 2020  5:23 PM CST -----  Regarding: Fiasp insulin PA    I have previously seen Ms Perlita Chavez ( 70, ID# [id number]) for evaluation and management of Type 1 diabetes mellitus.  She has used the Fiasp insulin pens for management.  I was recently told they were denied by her insurance plan (again) and I am requesting another PA.    The Fiasp insulin has a more rapid onset of action than the Novolog or Humalog insulins.  This allows for better control of the postmeal glucose rise (hyperglycemia).  Ms Chavez has used Fiasp insulin and it has been much more effective for her diabetes glucose management.  The Fiasp is medically necessary.    Please assist with the insurance appeal, thanks.    TI Lowe MD, [facility]

## 2020-11-19 NOTE — TELEPHONE ENCOUNTER
Prior Authorization Retail Medication Request    Medication/Dose: Fiasp Flextouch 100 unit/mL pen  ICD code (if different than what is on RX):  E10.3599  Previously Tried and Failed:  Novolog, Humalog, Humulin N  Rationale:  The Fiasp insulin has a more rapid onset of action than the Novolog or Humalog insulins.  This allows for better control of the postmeal glucose rise (hyperglycemia).  Ms Chavez has used Fiasp insulin and it has been much more effective for her diabetes glucose management    Insurance Name:  [facility]  Insurance ID:  [id number]      Pharmacy Information (if different than what is on RX)  Name:  [id number]  Phone:  [phone]

## 2020-11-19 NOTE — TELEPHONE ENCOUNTER
Central Prior Authorization Team   Phone: [phone]      PA Initiation    Medication: Fiasp Flextouch 100 unit/mL pen  Insurance Company: [employer] [place] - Phone [phone] Fax [phone]  Pharmacy Filling the Rx: [id number] [place], [place] - [address] AT [facility] [address]  Filling Pharmacy Phone: [phone]  Filling Pharmacy Fax:    Start Date: 11/19/2020

## 2020-11-23 RX ORDER — INSULIN ASPART INJECTION 100 [IU]/ML
3-8 INJECTION, SOLUTION SUBCUTANEOUS
Qty: 15 ML | Refills: 5 | Status: SHIPPED | OUTPATIENT
Start: 2020-11-23 | End: 2021-11-30

## 2020-11-23 NOTE — TELEPHONE ENCOUNTER
Pharmacy stated that they also need a new script.         Prior Authorization Approval    Authorization Effective Date: 8/20/2020  Authorization Expiration Date: 11/20/2021  Medication: Fiasp Flextouch 100 unit/mL pen  Approved Dose/Quantity:    Reference #:     Insurance Company: [employer] [place] - Phone [phone] Fax [phone]  Expected CoPay:       CoPay Card Available:      Foundation Assistance Needed:    Which Pharmacy is filling the prescription (Not needed for infusion/clinic administered): [id number] [place] [address] AT [facility] [address]  Pharmacy Notified: Yes  Patient Notified: Yes  **Instructed pharmacy to notify patient when script is ready to /ship.**

## 2020-11-23 NOTE — TELEPHONE ENCOUNTER
Prior authorization approved. Pharmacy is requesting new Rx for medication.    Shlomo Nelson CMA on 11/23/2020 at 1:12 PM

## 2020-11-23 NOTE — TELEPHONE ENCOUNTER
Message about the Fiasp insulin PA approval noted, new Fiasp pen Rx sent to pharmacy.    TI Lowe MD, MS  Endocrinology  [facility]

## 2020-11-27 DIAGNOSIS — I10 ESSENTIAL HYPERTENSION: ICD-10-CM

## 2020-11-27 DIAGNOSIS — E78.5 HYPERLIPIDEMIA LDL GOAL <100: ICD-10-CM

## 2020-11-27 DIAGNOSIS — E10.3599 TYPE 1 DIABETES MELLITUS WITH PROLIFERATIVE RETINOPATHY, MACULAR EDEMA PRESENCE UNSPECIFIED, UNSPECIFIED LATERALITY, UNSPECIFIED PROLIFERATIVE RETINOPATHY TYPE (H): ICD-10-CM

## 2020-11-27 NOTE — TELEPHONE ENCOUNTER
LOV 9-14-20 TL    Next 5 appointments (look out 90 days)    Dec 04, 2020  9:20 AM  PHYSICAL with Calos Melendez MD  [facility] [address] [facility] [address]  [phone]        Next OV 12- TL      Gela Lira RT (R)

## 2020-11-30 RX ORDER — LOSARTAN POTASSIUM 25 MG/1
25 TABLET ORAL DAILY
Qty: 90 TABLET | Refills: 0 | Status: SHIPPED | OUTPATIENT
Start: 2020-11-30 | End: 2020-12-04

## 2020-11-30 NOTE — TELEPHONE ENCOUNTER
Routing refill request to provider for review/approval because:  Labs not current:  Lipid    Statins Protocol [id number]/27/2020 03:52 AM   LDL on file in past 12 months

## 2020-12-04 ENCOUNTER — OFFICE VISIT (OUTPATIENT)
Dept: INTERNAL MEDICINE | Facility: CLINIC | Age: 50
End: 2020-12-04
Payer: COMMERCIAL

## 2020-12-04 VITALS
WEIGHT: 128 LBS | BODY MASS INDEX: 21.85 KG/M2 | OXYGEN SATURATION: 98 % | RESPIRATION RATE: 20 BRPM | TEMPERATURE: 98.7 F | HEART RATE: 88 BPM | DIASTOLIC BLOOD PRESSURE: 62 MMHG | HEIGHT: 64 IN | SYSTOLIC BLOOD PRESSURE: 116 MMHG

## 2020-12-04 DIAGNOSIS — Z00.00 ROUTINE HISTORY AND PHYSICAL EXAMINATION OF ADULT: ICD-10-CM

## 2020-12-04 DIAGNOSIS — E10.3599 TYPE 1 DIABETES MELLITUS WITH PROLIFERATIVE RETINOPATHY, MACULAR EDEMA PRESENCE UNSPECIFIED, UNSPECIFIED LATERALITY, UNSPECIFIED PROLIFERATIVE RETINOPATHY TYPE (H): ICD-10-CM

## 2020-12-04 DIAGNOSIS — D84.9 IMMUNOSUPPRESSED STATUS (H): Primary | ICD-10-CM

## 2020-12-04 DIAGNOSIS — E78.5 HYPERLIPIDEMIA LDL GOAL <100: ICD-10-CM

## 2020-12-04 DIAGNOSIS — I10 ESSENTIAL HYPERTENSION: ICD-10-CM

## 2020-12-04 DIAGNOSIS — M06.9 RHEUMATOID ARTHRITIS, INVOLVING UNSPECIFIED SITE, UNSPECIFIED WHETHER RHEUMATOID FACTOR PRESENT (H): ICD-10-CM

## 2020-12-04 LAB
ALT SERPL W P-5'-P-CCNC: 24 U/L (ref 0–50)
CHOLEST SERPL-MCNC: 159 MG/DL
HDLC SERPL-MCNC: 65 MG/DL
LDLC SERPL CALC-MCNC: 58 MG/DL
NONHDLC SERPL-MCNC: 94 MG/DL
TRIGL SERPL-MCNC: 180 MG/DL

## 2020-12-04 PROCEDURE — 90682 RIV4 VACC RECOMBINANT DNA IM: CPT | Performed by: INTERNAL MEDICINE

## 2020-12-04 PROCEDURE — 84460 ALANINE AMINO (ALT) (SGPT): CPT | Performed by: INTERNAL MEDICINE

## 2020-12-04 PROCEDURE — 99396 PREV VISIT EST AGE 40-64: CPT | Mod: 25 | Performed by: INTERNAL MEDICINE

## 2020-12-04 PROCEDURE — 90471 IMMUNIZATION ADMIN: CPT | Performed by: INTERNAL MEDICINE

## 2020-12-04 PROCEDURE — 80061 LIPID PANEL: CPT | Performed by: INTERNAL MEDICINE

## 2020-12-04 PROCEDURE — 36415 COLL VENOUS BLD VENIPUNCTURE: CPT | Performed by: INTERNAL MEDICINE

## 2020-12-04 RX ORDER — LOSARTAN POTASSIUM 25 MG/1
25 TABLET ORAL DAILY
Qty: 90 TABLET | Refills: 1 | Status: SHIPPED | OUTPATIENT
Start: 2020-12-04 | End: 2021-08-25

## 2020-12-04 RX ORDER — SIMVASTATIN 40 MG
TABLET ORAL
Qty: 90 TABLET | Refills: 3 | Status: SHIPPED | OUTPATIENT
Start: 2020-12-04 | End: 2021-11-30

## 2020-12-04 RX ORDER — SIMVASTATIN 40 MG
TABLET ORAL
Qty: 90 TABLET | Refills: 3 | OUTPATIENT
Start: 2020-12-04

## 2020-12-04 ASSESSMENT — ENCOUNTER SYMPTOMS
WEAKNESS: 0
CHILLS: 0
ARTHRALGIAS: 0
ABDOMINAL PAIN: 0
NERVOUS/ANXIOUS: 0
COUGH: 0
DIARRHEA: 0
SHORTNESS OF BREATH: 0
HEADACHES: 0
DIZZINESS: 0
SORE THROAT: 0
HEMATOCHEZIA: 0
HEMATURIA: 0
NAUSEA: 0
PALPITATIONS: 0
FEVER: 0
HEARTBURN: 0
BREAST MASS: 0
JOINT SWELLING: 0
EYE PAIN: 0
MYALGIAS: 0
CONSTIPATION: 0
DYSURIA: 0
FREQUENCY: 0
PARESTHESIAS: 0

## 2020-12-04 ASSESSMENT — MIFFLIN-ST. JEOR: SCORE: 1177.66

## 2020-12-04 NOTE — NURSING NOTE
"/62   Pulse 110   Temp 98.7  F (37.1  C) (Oral)   Resp 20   Ht 1.613 m (5' 3.5\")   Wt 58.1 kg (128 lb)   SpO2 98%   BMI 22.32 kg/m    Patient in for Female Px.  Sadia Varma, INDU    "

## 2020-12-04 NOTE — Clinical Note
Please abstract the following data from this visit with this patient into the appropriate field in Epic:  Pap smear done on this date: 11/19  (approximately), [place] [facility] [place] [facility] [place] , results were normal .

## 2020-12-04 NOTE — PROGRESS NOTES
SUBJECTIVE:   CC: Perlita Chavez is an 50 year old woman who presents for preventive health visit.       Patient has been advised of split billing requirements and indicates understanding: Yes  Healthy Habits:     Getting at least 3 servings of Calcium per day:  Yes    Bi-annual eye exam:  Yes    Dental care twice a year:  Yes    Sleep apnea or symptoms of sleep apnea:  None    Diet:  Diabetic    Frequency of exercise:  4-5 days/week    Duration of exercise:  45-60 minutes    Taking medications regularly:  Yes    Medication side effects:  None    PHQ-2 Total Score: 0    Additional concerns today:  Yes         Diabetes -patient follows up with her endocrinologist and last A1c 7.3       Hyperlipidemia Follow-Up      Are you regularly taking any medication or supplement to lower your cholesterol?   Yes- Simvastatin    Are you having muscle aches or other side effects that you think could be caused by your cholesterol lowering medication?  No    Rheumatoid arthritis/Sjogren's syndrome: On immunosuppressants and follows up with rheumatologist.        Today's PHQ-2 Score:   PHQ-2 ( 1999 [employer]) 12/4/2020   Q1: Little interest or pleasure in doing things 0   Q2: Feeling down, depressed or hopeless 0   PHQ-2 Score 0   Q1: Little interest or pleasure in doing things Not at all   Q2: Feeling down, depressed or hopeless Not at all   PHQ-2 Score 0       Abuse: Current or Past (Physical, Sexual or Emotional) - No  Do you feel safe in your environment? Yes    Past Medical History:   Diagnosis Date     Diabetic retinopathy associated with type 1 diabetes mellitus (H)     proliferative DR and bilateral laser PC surgeries     Hyperlipidemia LDL goal < 130      RA (rheumatoid arthritis) (H)     seeing rheumatologist.     Sjoegren syndrome     seeing rheumatologist.     Type 1 diabetes mellitus (H)     retinopathy, neuropathy     Visual impairment      Past Surgical History:   Procedure Laterality Date     [facility] NONSPECIFIC  PROCEDURE  8/00/97    T & A     [email or web address] NONSPECIFIC PROCEDURE  5/00/99    L eye surgery     [email or web address] NONSPECIFIC PROCEDURE  4/10/01    R cataract + IOL     [email or web address] NONSPECIFIC PROCEDURE  2002    bilat vitrectomy     [email or web address] NONSPECIFIC PROCEDURE  7/04    R eye vitrectomy     [email or web address] NONSPECIFIC PROCEDURE  5/08    R vitrectomy & partial retinal detachment      Current Outpatient Medications   Medication Sig Dispense Refill     ACE/ARB NOT PRESCRIBED, INTENTIONAL, 1 each by Other route continuous prn.       ASPIRIN 81 MG OR TABS ONE DAILY 100 3     blood glucose (EDWIN CONTOUR) test strip 1 strip by In Vitro route 4 times daily (before meals and nightly) Checks 4-5 x daily 400 strip 3     blood glucose (NO BRAND SPECIFIED) test strip Use to test blood sugar 4 times daily or as directed, Accu-check Guide test strips, E10.9. 400 strip 0     blood glucose monitoring (NO BRAND SPECIFIED) meter device kit Use to test blood sugar 5 times daily or as directed, Accu-check Guide meter. 1 kit 1     blood glucose monitoring (NO BRAND SPECIFIED) test strip Use to test blood sugar 4 times daily or as directed, Accu-check Guide meter test strips 400 strip 3     brimonidine (ALPHAGAN) 0.2 % ophthalmic solution INT 1 GTT IN OU BID  3     Continuous Blood Gluc Sensor (FREESTYLE LOUIS 2 SENSOR SYSTM) MISC 1 Device continuous prn (Change every 14 days) For use with Freestyle Louis 2  for continuous blood glucose monitoring 2 each 11     continuous blood glucose monitoring (FREESTYLE LOUIS) sensor For use with Freestyle Louis Flash  for continuous monitioring of blood glucose levels. Replace sensor every 14 days. 2 each 11     cycloSPORINE (RESTASIS) 0.05 % ophthalmic emulsion Place 1 drop into both eyes 2 times daily       diclofenac (VOLTAREN) 75 MG EC tablet TK 1 T PO BID  2     dorzolamide-timolol (COSOPT) 2-0.5 % ophthalmic solution INSTILL 1 GTT IN OU QAM  6     Ferrous Sulfate (IRON PO)        FOLIC ACID PO Take 1 mg by mouth 2 times  daily        InFLIXimab (REMICADE IV) Inject 300 mg into the vein Every 2 months       insulin aspart (FIASP FLEXTOUCH) 100 UNIT/ML pen-injector Inject 3-8 Units Subcutaneous 3 times daily (with meals) 15 mL 5     insulin aspart (NOVOLOG PEN) 100 UNIT/ML pen Inject 3-8 units subcutaneous with meals and correction doses, total daily dose approx 25 units 6 mL 1     insulin glargine (LANTUS SOLOSTAR) 100 UNIT/ML pen INJECT 22 TO 24 UNITS SUBCUTANEOUS DAILY AS DIRECTED. 15 mL 1     insulin pen needle (BD ELIUD U/F) 32G X 4 MM miscellaneous Use 4 pen needles daily or as directed. 400 each 0     losartan (COZAAR) 25 MG tablet Take 1 tablet (25 mg) by mouth daily 90 tablet 1     Methotrexate Sodium (METHOTREXATE PO) Take by mouth once a week 7 tablets once a week       Omega-3 Fatty Acids (FISH OIL TRIPLE STRENGTH) 1400 MG CAPS Take 2 tablets by mouth daily.       ONE DAILY MULTIPLE VITAMIN OR TABS one daily  0     predniSONE (DELTASONE) 5 MG tablet TK 1 T PO QAM  0     simvastatin (ZOCOR) 40 MG tablet TAKE 1 TABLET BY MOUTH AT BEDTIME TO LOWER CHOLESTEROL 90 tablet 3     sulfaSALAzine (AZULFIDINE) 500 MG tablet Take 500 mg by mouth 2 times daily 2 tablets twice daily       ValACYclovir (VALTREX) 500 MG tablet Take 1 tablet by mouth daily. 14 tablet prn     MALKA 28 3-0.03 MG OR TABS 1 TABLET DAILY 3 months prn     Family History   Problem Relation Age of Onset     Arthritis Mother         on celebrex,alive & healthy 2002     Breast Cancer Mother      Arthritis Father         on celebrex(2002)     Heart Disease Father         heart attack 1990, 60 years old (2002)     Diabetes Father      Cerebrovascular Disease Father         age 70     Thyroid Disease Sister         on thyroid med for couple of years , 35 yrs now(2002)     Cancer Paternal Grandmother         STOMACH     Cerebrovascular Disease Paternal Grandfather         age 90         Social History     Tobacco Use     Smoking status: Never Smoker     Smokeless  "tobacco: Never Used   Substance Use Topics     Alcohol use: Yes     Comment: 1 drink every 3 weeks     If you drink alcohol do you typically have >3 drinks per day or >7 drinks per week? No    Alcohol Use 12/4/2020   Prescreen: >3 drinks/day or >7 drinks/week? No   Prescreen: >3 drinks/day or >7 drinks/week? -   No flowsheet data found.    Reviewed orders with patient.  Reviewed health maintenance and updated orders accordingly - Yes       Pertinent mammograms are reviewed under the imaging tab.  History of abnormal Pap smear: NO - age 30-65 PAP every 5 years with negative HPV co-testing recommended     Reviewed and updated as needed this visit by clinical staff                 Reviewed and updated as needed this visit by Provider                    Review of Systems   Constitutional: Negative for chills and fever.   HENT: Negative for congestion, ear pain, hearing loss and sore throat.    Eyes: Negative for pain and visual disturbance.   Respiratory: Negative for cough and shortness of breath.    Cardiovascular: Negative for chest pain, palpitations and peripheral edema.   Gastrointestinal: Negative for abdominal pain, constipation, diarrhea, heartburn, hematochezia and nausea.   Breasts:  Negative for tenderness, breast mass and discharge.   Genitourinary: Negative for dysuria, frequency, genital sores, hematuria, pelvic pain, urgency, vaginal bleeding and vaginal discharge.   Musculoskeletal: Negative for arthralgias, joint swelling and myalgias.   Skin: Negative for rash.   Neurological: Negative for dizziness, weakness, headaches and paresthesias.   Psychiatric/Behavioral: Negative for mood changes. The patient is not nervous/anxious.         OBJECTIVE:   /62   Pulse 88   Temp 98.7  F (37.1  C) (Oral)   Resp 20   Ht 1.613 m (5' 3.5\")   Wt 58.1 kg (128 lb)   SpO2 98%   BMI 22.32 kg/m    Physical Exam  GENERAL: healthy, alert and no distress  EYES: Eyes grossly normal to inspection, PERRL and " conjunctivae and sclerae normal  NECK: no adenopathy, no asymmetry, masses, or scars and thyroid normal to palpation  RESP: lungs clear to auscultation - no rales, rhonchi or wheezes  BREAST: normal without masses, tenderness or nipple discharge and no palpable axillary masses or adenopathy  CV: regular rate and rhythm, normal S1 S2, no S3 or S4, no murmur, click or rub, no peripheral edema and peripheral pulses strong  ABDOMEN: soft, nontender,  and bowel sounds normal  MS: no gross musculoskeletal defects noted, no edema  NEURO: Normal strength and tone, mentation intact and speech normal  PSYCH: mentation appears normal, affect normal/bright     ASSESSMENT/PLAN:        (Z00.00) Routine history and physical examination of adult  Plan: Lipid panel reflex to direct LDL Fasting, Fecal        colorectal cancer screen FIT, ALT. Mammogram ordered         Pt does not want colonoscopy at this time     (M06.9) Rheumatoid arthritis, involving unspecified site, unspecified whether rheumatoid factor present (H)  (D84.9) Immunosuppressed status (H)    Plan: Follows up with rheumatologist and on prednisone, Remicade      (E78.5) Hyperlipidemia LDL goal <100  Plan: Check lipid panel , simvastatin (ZOCOR) 40 MG tablet refilled as directed.explained clearly about the medication,insructions and side effects.      (E10.8401) Type 1 diabetes mellitus with proliferative retinopathy, macular edema presence unspecified, unspecified laterality, unspecified proliferative retinopathy type (H)  Plan: Follows up with endocrinologist ,losartan (COZAAR) 25 MG tablet            Patient has been advised of split billing requirements and indicates understanding: Yes  COUNSELING:  Reviewed preventive health counseling, as reflected in patient instructions       Regular exercise       Healthy diet/nutrition       Immunizations    Vaccinated for: Influenza          Estimated body mass index is 22.32 kg/m  as calculated from the following:    Height  "as of this encounter: 1.613 m (5' 3.5\").    Weight as of this encounter: 58.1 kg (128 lb).        She reports that she has never smoked. She has never used smokeless tobacco.       Please abstract the following data from this visit with this patient into the appropriate field in Epic:  Pap smear done on this date: 11/19  (approximately), [place] , results were normal .        Counseling Resources:  ATP IV Guidelines  Pooled Cohorts Equation Calculator  Breast Cancer Risk Calculator  BRCA-Related Cancer Risk Assessment: FHS-7 Tool  FRAX Risk Assessment  ICSI Preventive Guidelines  Dietary Guidelines for Americans, 2010  USDA's MyPlate  ASA Prophylaxis  Lung CA Screening    Calos Melendez MD  [facility]  "

## 2020-12-11 ENCOUNTER — VIRTUAL VISIT (OUTPATIENT)
Dept: ENDOCRINOLOGY | Facility: CLINIC | Age: 50
End: 2020-12-11
Payer: COMMERCIAL

## 2020-12-11 DIAGNOSIS — E10.40 TYPE 1 DIABETES MELLITUS WITH DIABETIC NEUROPATHY (H): ICD-10-CM

## 2020-12-11 DIAGNOSIS — E10.3599 TYPE 1 DIABETES MELLITUS WITH PROLIFERATIVE RETINOPATHY, MACULAR EDEMA PRESENCE UNSPECIFIED, UNSPECIFIED LATERALITY, UNSPECIFIED PROLIFERATIVE RETINOPATHY TYPE (H): Primary | ICD-10-CM

## 2020-12-11 PROCEDURE — 99213 OFFICE O/P EST LOW 20 MIN: CPT | Mod: 95 | Performed by: INTERNAL MEDICINE

## 2020-12-11 NOTE — PROGRESS NOTES
"Perlita Chavez is a 50 year old female who is being evaluated via a billable video visit.      The patient has been notified of following:     \"This video visit will be conducted via a call between you and your physician/provider. We have found that certain health care needs can be provided without the need for an in-person physical exam.  This service lets us provide the care you need with a video conversation.  If a prescription is necessary we can send it directly to your pharmacy.  If lab work is needed we can place an order for that and you can then stop by our lab to have the test done at a later time.    Video visits are billed at different rates depending on your insurance coverage.  Please reach out to your insurance provider with any questions.    If during the course of the call the physician/provider feels a video visit is not appropriate, you will not be charged for this service.\"    Patient has given verbal consent for Video visit? Yes  How would you like to obtain your AVS? Verbally Reviewed  If you are dropped from the video visit, the video invite should be resent to: Cellphone  Will anyone else be joining your video visit? No        Recent issues:  Diabetes follow-up  Using the Freestyle Libre2 CGMS sensor, likes it  Feeling well overall, no new health issues reported           1972. Diagnosis of diabetes mellitus age 2 1/2, living in [facility]  Initial treatment with NPH and Regular insulin medications  Had seen Dr. Sarah Sierra/ Endocrinology  Switched to MDI insulin plan using Nv and Levemir insulin     8/19/14. Initial consult with me at my former clinic ([facility])  8/20/14. CDE RD evaluation, also tried demo OmniPod pump  Used Novolog Echo 1/2U pen, with I:C carb counting method  Tried Lantus BID dosing, then Tresiba (expensive), then Basaglar              Previously used Novolog insulin  5/2018. Switched to Fiasp 3/2018, then Fiasp non-formulary 2019  Meals typically brunch 10am and " supper 9:30pm  Current DM meds:  Lantus Solostar                       17U each morning  Fiasp Flextouch                      1:15 ratio (typically 6-12 per meal)                                                              sscale 1U per 40>150                                                              Target  mg/dl     Using Accucheck Guide BG meter              Tests 4x/day              Does own I:C and ISF calculations    Used the 14-day Freestyle Mary CGMS  Changed to the Libre2 CGMS   7d avg 170 mg/dl, 30d avg 179 mg/dl    Exercises with gym workouts, less often    Previous [facility] labs include:  Lab Results   Component Value Date    A1C 7.3 (H) 09/16/2020     09/16/2020    POTASSIUM 4.1 09/16/2020    CHLORIDE 107 09/16/2020    CO2 23 09/16/2020    ANIONGAP 8 09/16/2020     (H) 09/16/2020    BUN 16 09/16/2020    CR 0.83 09/16/2020    GFRESTIMATED 82 09/16/2020    GFRESTBLACK >90 09/16/2020    FINESSE 8.9 09/16/2020    CHOL 159 12/04/2020    TRIG 180 (H) 12/04/2020    HDL 65 12/04/2020    LDL 58 12/04/2020    NHDL 94 12/04/2020    UCRR 256 09/16/2020    MICROL 17 09/16/2020    UMALCR 6.56 09/16/2020     No history of vascular disease.  Takes simvastatin for hyperlipidemia management.  DM Complications:              Retinopathy                          Previous proliferative DR and bilateral laser PC surgeries                          Had cataracts, retinal detachment repair OD, higher occular pressures                          Significant vision loss, needs print magnification                          Sees Fer Davison and EBER Trevizo/ophthalmology, last eye exam with Dr. Davison 1/2019              Neuropathy:                          Decreased sensation in feet        Single, previously worked at [employer] in [place], now unemployed  Sees Dr. Humberto Melendez/ [facility] for general medicine evaluations.  Also sees Dr. Blanco/[facility] rheumatology           ROS: 10 point ROS neg  other than the symptoms noted above in the HPI.     GENERAL: some fatigue; weight stable; denies fevers, chills, night sweats.   HEENT: minimal/no vision from right eye, dry eyes/mouth; no dysphagia, odonophagia, diplopia  THYROID:  no apparent hyper or hypothyroid symptoms  CV: no chest pain, pressure, palpitations  LUNGS: no SOB, FUENTES, cough, wheezing   ABDOMEN: rare morning nausea; no diarrhea, constipation, abdominal pain  EXTREMITIES: no rashes, ulcers, edema  NEUROLOGY: decreased sensation at feet; no headaches, denies changes in vision, tingling, extremitiy numbness   MSK: mild hand tendinitis pains; no muscle aches, weakness  SKIN: denies rashes or lesions/foot sores  :  menses regular on Kristi OCP med  PSYCH:  stable mood, no significant anxiety or depression  ENDOCRINE: no heat or cold intolerance     Physical Exam (visual exam)  VS:  no vital signs taken for video visit  CONSTITUTIONAL: healthy, alert and NAD, well dressed, answering questions appropriately  ENT: no nose swelling or nasal discharge, mouth redness or gum changes.  EYES: eyes grossly normal to inspection, conjunctivae and sclerae normal, no exophthalmos or proptosis  THYROID:  no apparent nodules or goiter  LUNGS: no audible wheeze, cough or visible cyanosis, no visible retractions or increased work of breathing  ABDOMEN: abdomen not evaluated  EXTREMITIES: no hand tremors, limited exam  NEUROLOGY: CN grossly intact, mentation intact and speech normal   SKIN:  no apparent skin lesions, rash, or edema with visualized skin appearance  PSYCH: mentation appears normal, affect normal/bright, judgement and insight intact,   normal speech and appearance well groomed       LABS:     All pertinent notes, labs, and images personally reviewed by me.      A/P:          Encounter Diagnoses   Name       Type 1 diabetes mellitus with proliferative retinopathy, macular edema presence unspecified, unspecified laterality, unspecified proliferative  retinopathy type (H)       Type 1 diabetes mellitus with diabetic neuropathy (H)           Comments:  Reviewed the diabetes and health history     Plan:  Discussed general issues with the diabetes diagnosis and managemen  We discussed the hgbA1c test which reflects previous overall glucose levels or control  Discussed the importance of blood glucose (BG) testing to assess glucose trends  Discussed use of the Accu-check Guide BG meter  Provided general overview of the multiple daily injection (MDI) plan using rapid acting mealtime               and longacting insulin medications  Discussed use of the Libre2 CGMS sensor     Recommend:  Continue current insulin treatment and diabetes management, with Fiasp mealtime & Lantus  Reviewed dose strategy with premeal or presnack, also correction dose Fiasp insulin use   Goal target premeal glucose 100-150 mg/dl  No labs ordered at this time  Reviewed glucose testing issues, continue the CGM sensor use, reasons documented in prior chart notes   Continue use of the Libre2 CGMS, scan at least 4x/day   Track glucose trends, also SG avg on Libre2 Mode device    Continue the losartan daily dose  Continue current simvastatin lipid medication   Advise having fasting lipid panel testing at least annually  Keep regular f/u ophthalmology evaluation appointments     Contact PCP regarding the anxiety issues, management  Addressed patient questions today        More than 50% of the time spent with Ms. Chavez on counseling / coordinating her care.  Total appointment time was 15 minutes.     Follow-up:  3/5/2021 at 11:30 am, Dana Lowe MD, MS  Endocrinology  [facility]      Video-Visit Details    Type of service:  Video Visit    Video Start Time: 11:50 am  Video End Time: 12:05 pm    Originating Location (pt. Location): home    Distant Location (provider location):  [facility]/home     Platform used for Video Visit: Kevon

## 2020-12-15 ENCOUNTER — TRANSFERRED RECORDS (OUTPATIENT)
Dept: HEALTH INFORMATION MANAGEMENT | Facility: CLINIC | Age: 50
End: 2020-12-15

## 2020-12-15 LAB
ALT SERPL-CCNC: 44 IU/L (ref 5–35)
AST SERPL-CCNC: 47 U/L (ref 5–34)
CREAT SERPL-MCNC: 0.83 MG/DL (ref 0.5–1.3)
GFR SERPL CREATININE-BSD FRML MDRD: 77.3 ML/MIN/1.73M2

## 2021-01-07 ENCOUNTER — HOSPITAL ENCOUNTER (OUTPATIENT)
Dept: MAMMOGRAPHY | Facility: CLINIC | Age: 51
Discharge: HOME | End: 2021-01-07
Attending: INTERNAL MEDICINE | Admitting: INTERNAL MEDICINE
Payer: COMMERCIAL

## 2021-01-07 DIAGNOSIS — Z00.00 ROUTINE HISTORY AND PHYSICAL EXAMINATION OF ADULT: ICD-10-CM

## 2021-01-07 PROCEDURE — 77063 BREAST TOMOSYNTHESIS BI: CPT

## 2021-01-26 ENCOUNTER — TRANSFERRED RECORDS (OUTPATIENT)
Dept: HEALTH INFORMATION MANAGEMENT | Facility: CLINIC | Age: 51
End: 2021-01-26

## 2021-01-26 LAB
ALT SERPL-CCNC: 85 IU/L (ref 5–35)
AST SERPL-CCNC: 71 U/L (ref 5–34)

## 2021-02-08 ENCOUNTER — VIRTUAL VISIT (OUTPATIENT)
Dept: ENDOCRINOLOGY | Facility: CLINIC | Age: 51
End: 2021-02-08
Payer: COMMERCIAL

## 2021-02-08 DIAGNOSIS — E10.40 TYPE 1 DIABETES MELLITUS WITH DIABETIC NEUROPATHY (H): ICD-10-CM

## 2021-02-08 DIAGNOSIS — E10.3559 TYPE 1 DIABETES MELLITUS WITH STABLE PROLIFERATIVE RETINOPATHY, UNSPECIFIED LATERALITY (H): Primary | ICD-10-CM

## 2021-02-08 PROCEDURE — 99213 OFFICE O/P EST LOW 20 MIN: CPT | Mod: 95 | Performed by: INTERNAL MEDICINE

## 2021-02-08 RX ORDER — NORETHINDRONE
0.35 KIT DAILY
COMMUNITY
Start: 2021-01-04 | End: 2022-02-23

## 2021-02-08 NOTE — PROGRESS NOTES
Perlita is a 50 year old who is being evaluated via a billable video visit.      How would you like to obtain your AVS? Verbally Reviewed  If the video visit is dropped, the invitation should be resent by: Cellphone  Will anyone else be joining your video visit? No      Video Start Time: 10:00 am    Recent issues:  Diabetes follow-up evaluation  Still using the Freestyle Libre2 CGMS sensor, loves it  Stopped her OCP medication, but having hot flashes           1972. Diagnosis of diabetes mellitus age 2 1/2, living in [facility]  Initial treatment with NPH and Regular insulin medications  Had seen Dr. Sarah Sierra/[facility]  Switched to MDI insulin plan using Nv and Levemir insulin     8/19/14. Initial consult with me at my former clinic ([facility])  8/20/14. CDE RD evaluation, also tried demo OmniPod pump  Used Novolog Echo 1/2U pen, with I:C carb counting method  Tried Lantus BID dosing, then Tresiba (expensive), then Basaglar              Previously used Novolog insulin  5/2018. Switched to Fiasp 3/2018, then Fiasp non-formulary 2019  Meals typically brunch 10am and supper 9:30pm  Current DM meds:  Lantus Solostar                       17U each morning  Fiasp Flextouch                      1:15 ratio (typically 8Uper meal)      sscale 1U per 40>150                           target  mg/dl     Using Accucheck Guide BG meter              Tests 4x/day              Does own I:C and ISF calculations  9/2019. She has worn a free sample LibrePersonal continuous glucose sensor              Started Freestyle Mary CGM, then upgraded to Libre2 CGM with [place]    Previous [facility] labs include:  Lab Results   Component Value Date    A1C 7.3 (H) 09/16/2020     09/16/2020    POTASSIUM 4.1 09/16/2020    CHLORIDE 107 09/16/2020    CO2 23 09/16/2020    ANIONGAP 8 09/16/2020     (H) 09/16/2020    BUN 16 09/16/2020    CR 0.830 12/15/2020    GFRESTIMATED 77.3 12/15/2020    GFRESTBLACK >90 09/16/2020    FINESSE 8.9  09/16/2020    CHOL 159 12/04/2020    TRIG 180 (H) 12/04/2020    HDL 65 12/04/2020    LDL 58 12/04/2020    NHDL 94 12/04/2020    UCRR 256 09/16/2020    MICROL 17 09/16/2020    UMALCR 6.56 09/16/2020     No history of vascular disease.  Takes simvastatin for hyperlipidemia management.  DM Complications:              Retinopathy                          Previous proliferative DR and bilateral laser PC surgeries                          Had cataracts, retinal detachment repair OD, higher occular pressures                          Significant vision loss, needs print magnification                          Sees Fer Davison and EBER Trevizo/ophthalmology, last eye exam with Dr. Davison 1/2019              Neuropathy:                          Decreased sensation in feet        Single, previously worked at [employer] in [employer], then furloughed  Exercises at [email or web address] vs [employer], enjoys exercise classes  Sees Dr. Humberto Melendez/[facility] for general medicine evaluations.  Also sees Dr. Blanco/[facility] rheumatology        ROS: 10 point ROS neg other than the symptoms noted above in the HPI.     GENERAL: some fatigue; weight stable; denies fevers, chills, night sweats.   HEENT: minimal/no vision from right eye, dry eyes/mouth; no dysphagia, odonophagia, diplopia  THYROID:  no apparent hyper or hypothyroid symptoms  CV: no chest pain, pressure, palpitations  LUNGS: no SOB, FUENTES, cough, wheezing   ABDOMEN: rare morning nausea; no diarrhea, constipation, abdominal pain  EXTREMITIES: no rashes, ulcers, edema  NEUROLOGY: decreased sensation at feet; no headaches, denies changes in vision, tingling, extremitiy numbness   MSK: mild hand tendinitis pains; no muscle aches, weakness  SKIN: denies rashes or lesions/foot sores  :  no menses since stopping OCP medication 1/2021  PSYCH:  stable mood, no significant anxiety or depression  ENDOCRINE: intermittent hot flashes     Physical Exam  (visual exam)  VS:  no vital signs taken for video visit  CONSTITUTIONAL: healthy, alert and NAD, well dressed, answering questions appropriately  ENT: no nose swelling or nasal discharge, mouth redness or gum changes.  EYES: eyes grossly normal to inspection, conjunctivae and sclerae normal, no exophthalmos or proptosis  THYROID:  no apparent nodules or goiter  LUNGS: no audible wheeze, cough or visible cyanosis, no visible retractions or increased work of breathing  ABDOMEN: abdomen not evaluated  EXTREMITIES: no hand tremors, limited exam  NEUROLOGY: CN grossly intact, mentation intact and speech normal   SKIN:  no apparent skin lesions, rash, or edema with visualized skin appearance  PSYCH: mentation appears normal, affect normal/bright, judgement and insight intact,   normal speech and appearance well groomed       LABS:     All pertinent notes, labs, and images personally reviewed by me.      A/P:          Encounter Diagnoses   Name       Type 1 diabetes mellitus with proliferative retinopathy, macular edema presence unspecified, unspecified laterality, unspecified proliferative retinopathy type (H)       Type 1 diabetes mellitus with diabetic neuropathy (H)           Comments:  Reviewed the diabetes and health history  Reviewed and interpreted tests that I previously ordered. Ordered appropriate tests for the endocrinology disease management.    Management options discussed and implemented after shared medical decision making with the patient.     Plan:  Discussed general issues with the diabetes diagnosis and managemen  We discussed the hgbA1c test which reflects previous overall glucose levels or control  Discussed the importance of blood glucose (BG) testing to assess glucose trends  Discussed use of the Accu-check Guide BG meter  Provided general overview of the multiple daily injection (MDI) plan using rapid acting mealtime               and longacting insulin medications     Recommend:  Continue current  insulin treatment and diabetes management, with Fiasp mealtime & Lantus  Reviewed dose strategy with premeal or presnack, also correction dose Fiasp insulin use   Goal target premeal glucose 100-150 mg/dl  Reviewed glucose testing issues:              Limitations with her current fingertip blood glucose meter testing with:                          Time required for work-breaks and fingertip testing in private space                          Less frequent testing                          Difficulty reading font on BG meter              Advantages of using a CGMS device such as the Freestyle LibrePersonal or Libre2 sensor:                          Less time required for obtaining glucose result, improved frequency of testing                          Able to scan sensor through her work clothing                          Glucose trend information to assess premeal/postmeal                                       hyperglycemia/hypoglycemia                          Glucose trend evaluation after exercise                          Glucose alarms for high and low levels                          Expect less hypoglycemia and overall morbidity, less risk of hospitalization with                                       the Type 1 diabetes management  No labs ordered at this time, but plan lab testing at our clinic at upcoming clinic appt visit 2/19/21        Continue the losartan daily dose  Continue current simvastatin lipid medication   Advise having fasting lipid panel testing and dilated eye examination at least annually     Keep regular follow-up appointments with PCP, GYN, rheumatology  Addressed patient questions today     Total time spent in with the patient evaluation:  15 min  Additional time spent reviewing pertinent lab tests and chart notes, and documentation:  5 min    Follow-up:  10/19/21 at 10:30 am, Return    TI Lowe MD, MS  Endocrinology  [facility]        Video-Visit Details    Type of service:  Video  Visit    Video End Time: 10:20 am    Originating Location (pt. Location): home    Distant Location (provider location):  [facility]/home     Platform used for Video Visit: K[employer]on

## 2021-02-19 ENCOUNTER — TRANSFERRED RECORDS (OUTPATIENT)
Dept: HEALTH INFORMATION MANAGEMENT | Facility: CLINIC | Age: 51
End: 2021-02-19

## 2021-02-19 ENCOUNTER — OFFICE VISIT (OUTPATIENT)
Dept: ENDOCRINOLOGY | Facility: CLINIC | Age: 51
End: 2021-02-19
Payer: COMMERCIAL

## 2021-02-19 VITALS
DIASTOLIC BLOOD PRESSURE: 70 MMHG | SYSTOLIC BLOOD PRESSURE: 139 MMHG | WEIGHT: 130 LBS | HEIGHT: 64 IN | BODY MASS INDEX: 22.2 KG/M2

## 2021-02-19 DIAGNOSIS — E10.40 TYPE 1 DIABETES MELLITUS WITH DIABETIC NEUROPATHY (H): ICD-10-CM

## 2021-02-19 DIAGNOSIS — E10.3599 TYPE 1 DIABETES MELLITUS WITH PROLIFERATIVE RETINOPATHY, MACULAR EDEMA PRESENCE UNSPECIFIED, UNSPECIFIED LATERALITY, UNSPECIFIED PROLIFERATIVE RETINOPATHY TYPE (H): Primary | ICD-10-CM

## 2021-02-19 LAB
ALT SERPL W P-5'-P-CCNC: 68 U/L (ref 0–50)
ANION GAP SERPL CALCULATED.3IONS-SCNC: 3 MMOL/L (ref 3–14)
BUN SERPL-MCNC: 14 MG/DL (ref 7–30)
CALCIUM SERPL-MCNC: 9.7 MG/DL (ref 8.5–10.1)
CHLORIDE SERPL-SCNC: 111 MMOL/L (ref 94–109)
CO2 SERPL-SCNC: 31 MMOL/L (ref 20–32)
CREAT SERPL-MCNC: 0.89 MG/DL (ref 0.52–1.04)
GFR SERPL CREATININE-BSD FRML MDRD: 75 ML/MIN/{1.73_M2}
GLUCOSE SERPL-MCNC: 82 MG/DL (ref 70–99)
HBA1C MFR BLD: 7.3 % (ref 0–5.6)
POTASSIUM SERPL-SCNC: 3.9 MMOL/L (ref 3.4–5.3)
RETINOPATHY: POSITIVE
SODIUM SERPL-SCNC: 145 MMOL/L (ref 133–144)
TSH SERPL DL<=0.005 MIU/L-ACNC: 2.48 MU/L (ref 0.4–4)

## 2021-02-19 PROCEDURE — 80048 BASIC METABOLIC PNL TOTAL CA: CPT | Performed by: INTERNAL MEDICINE

## 2021-02-19 PROCEDURE — 95251 CONT GLUC MNTR ANALYSIS I&R: CPT | Performed by: INTERNAL MEDICINE

## 2021-02-19 PROCEDURE — 83036 HEMOGLOBIN GLYCOSYLATED A1C: CPT | Performed by: INTERNAL MEDICINE

## 2021-02-19 PROCEDURE — 84443 ASSAY THYROID STIM HORMONE: CPT | Performed by: INTERNAL MEDICINE

## 2021-02-19 PROCEDURE — 36415 COLL VENOUS BLD VENIPUNCTURE: CPT | Performed by: INTERNAL MEDICINE

## 2021-02-19 PROCEDURE — 84460 ALANINE AMINO (ALT) (SGPT): CPT | Performed by: INTERNAL MEDICINE

## 2021-02-19 PROCEDURE — 99214 OFFICE O/P EST MOD 30 MIN: CPT | Performed by: INTERNAL MEDICINE

## 2021-02-19 ASSESSMENT — MIFFLIN-ST. JEOR: SCORE: 1186.74

## 2021-02-19 NOTE — PROGRESS NOTES
Recent issues:  Diabetes follow-up evaluation  Using the Freestyle Libre2 CGMS sensor, likes it  Patient grieving... her cat diet recently          1972. Diagnosis of diabetes mellitus age 2 1/2, living in [facility]  Initial treatment with NPH and Regular insulin medications  Had seen Dr. Sarah Sierra/[facility]  Switched to MDI insulin plan using Nv and Levemir insulin     8/19/14. Initial consult with me at my former clinic ([facility])  8/20/14. CDE RD evaluation, also tried demo OmniPod pump  Used Novolog Echo 1/2U pen, with I:C carb counting method  Tried Lantus BID dosing, then Tresiba (expensive), then Basaglar              Previously used Novolog insulin  5/2018. Switched to Fiasp 3/2018, then Fiasp non-formulary 2019  Meals typically brunch 10am and supper 9:30pm  Current DM meds:  Lantus Solostar                       18U each morning  Fiasp Flextouch                      1:15 ratio (typically 8Uper meal)                                                  sscale 1U per 40>150                                                  target  mg/dl     Using Accucheck Guide BG meter              Tests 4x/day              Does own I:C and ISF calculations  9/2019. She has worn a free sample LibrePersonal continuous glucose sensor              Started Freestyle Mary CGM, then upgraded to Libre2 CGM with [place]    Recent Freestyle Mary data:           Previous  labs include:  Lab Results   Component Value Date    A1C 7.3 (H) 09/16/2020     09/16/2020    POTASSIUM 4.1 09/16/2020    CHLORIDE 107 09/16/2020    CO2 23 09/16/2020    ANIONGAP 8 09/16/2020     (H) 09/16/2020    BUN 16 09/16/2020    CR 0.830 12/15/2020    GFRESTIMATED 77.3 12/15/2020    GFRESTBLACK >90 09/16/2020    FINESSE 8.9 09/16/2020    CHOL 159 12/04/2020    TRIG 180 (H) 12/04/2020    HDL 65 12/04/2020    LDL 58 12/04/2020    NHDL 94 12/04/2020    UCRR 256 09/16/2020    MICROL 17 09/16/2020    UMALCR 6.56 09/16/2020     No history  "of vascular disease.  Takes simvastatin for hyperlipidemia management.  DM Complications:              Retinopathy                          Previous proliferative DR and bilateral laser PC surgeries                          Had cataracts, retinal detachment repair OD, higher occular pressures                          Significant vision loss, needs print magnification                          Sees Fer Davison and EBER Trevizo/ophthalmology, last eye exam with Dr. Davison 1/2019              Neuropathy:                          Decreased sensation in feet        Single, previously worked at [employer] in [place], then furloughed  Has cat Zoey Schultz, age 20   Exercises at [employer] [place] vs [employer], enjoys exercise classes  Sees Dr. Humberto Melendez/[facility] for general medicine evaluations.  Also sees Dr. Blanco/[facility] rheumatology        ROS: 10 point ROS neg other than the symptoms noted above in the HPI.     GENERAL: some fatigue; weight stable; denies fevers, chills, night sweats.   HEENT: minimal/no vision from right eye, dry eyes/mouth; no dysphagia, odonophagia, diplopia  THYROID:  no apparent hyper or hypothyroid symptoms  CV: no chest pain, pressure, palpitations  LUNGS: no SOB, FUENTES, cough, wheezing   ABDOMEN: rare morning nausea; no diarrhea, constipation, abdominal pain  EXTREMITIES: no rashes, ulcers, edema  NEUROLOGY: decreased sensation at feet; no headaches, denies changes in vision, tingling, extremitiy numbness   MSK: mild hand tendinitis pains; no muscle aches, weakness  SKIN: denies rashes or lesions/foot sores  :  no menses since stopping OCP medication 1/2021  PSYCH:  stable mood, no significant anxiety or depression  ENDOCRINE: intermittent hot flashes      Physical Exam   VS: /70   Ht 1.613 m (5' 3.5\")   Wt 59 kg (130 lb)   BMI 22.67 kg/m    GENERAL: AXOX3, NAD, well dressed, answering questions appropriately, appears stated age.  ENT: no " nose swelling or nasal discharge, mouth redness or gum changes.  EYES: eyes grossly normal to inspection, conjunctivae and sclerae normal, no exophthalmos or proptosis  THYROID:  no apparent nodules or goiter  LUNGS: no audible wheeze, cough or visible cyanosis, no visible retractions or increased work of breathing  ABDOMEN: abdomen normal size  EXTREMITIES: normal appearance of feet; no edema, +pedal pulses, no lesions  NEUROLOGY: CN grossly intact, no tremors  MSK: grossly intact  SKIN:  no apparent skin lesions, rash, or edema with visualized skin appearance  PSYCH: mentation appears normal, affect normal/bright, judgement and insight intact,   normal speech and appearance well groomed        LABS:     All pertinent notes, labs, and images personally reviewed by me.      A/P:          Encounter Diagnoses   Name       Type 1 diabetes mellitus with proliferative retinopathy, macular edema presence unspecified, unspecified laterality, unspecified proliferative retinopathy type (H)       Type 1 diabetes mellitus with diabetic neuropathy (H)           Comments:  Reviewed the diabetes and health history  Reviewed and interpreted tests that I previously ordered.   Ordered appropriate tests for the endocrinology disease management.    Management options discussed and implemented after shared medical decision making with the patient.     Plan:  Discussed general issues with the diabetes diagnosis and managemen  We discussed the hgbA1c test which reflects previous overall glucose levels or control  Discussed the importance of blood glucose (BG) testing to assess glucose trends  Discussed use of the Accu-check Guide BG meter  Provided general overview of the multiple daily injection (MDI) plan using rapid acting mealtime               and longacting insulin medications  Reviewed recent Freestyle Mary CGM glucose sensor data, in detail.     Recommend:  Continue current insulin treatment and diabetes management, with Fiasp  mealtime & Lantus:  Lower the Lantus insulin dose and take the Fiasp at beginning of the meal  Continue diabetes medications as:  Lantus Solostar                       17U each morning  Fiasp Flextouch                      1:15 ratio (typically 8Uper meal)                                                  sscale 1U per 40>150                                                  target  mg/dl    If premeal sensor glucose at low end of range, reduce the Fiasp dose by 1-2 units  Continue use of the Freestyle Libre2 sensor  Continue the losartan daily dose  Continue current simvastatin lipid medication   Advise having fasting lipid panel testing and dilated eye examination at least annually     Keep regular follow-up appointments with PCP, GYN, rheumatology  Addressed patient questions today     Total time spent in with the patient evaluation:  25 min  Additional time spent reviewing pertinent lab tests and chart notes, and documentation:  5 min     Follow-up:  5/7/21 at 11am, Return     TI Lowe MD, MS  Endocrinology  [facility]

## 2021-02-19 NOTE — PATIENT INSTRUCTIONS
Diabetes management plan:    Lower the Lantus insulin dose and take the Fiasp at beginning of the meal  Continue diabetes medications as:  Lantus Solostar                       17U each morning  Fiasp Flextouch                      1:15 ratio (typically 8Uper meal)                                                  sscale 1U per 40>150                                                  target  mg/dl    If premeal sensor glucose at low end of range, reduce the Fiasp dose by 1-2 units  Continue use of the Freestyle Libre2 sensor  Contact Dr. Lowe if questions.    Next appt with Dr. Lowe 5/7/21 at 11am, office visit

## 2021-03-08 ENCOUNTER — TRANSFERRED RECORDS (OUTPATIENT)
Dept: HEALTH INFORMATION MANAGEMENT | Facility: CLINIC | Age: 51
End: 2021-03-08

## 2021-03-08 LAB
ALT SERPL-CCNC: 48 IU/L (ref 5–35)
AST SERPL-CCNC: 41 U/L (ref 5–34)

## 2021-03-16 ENCOUNTER — TRANSFERRED RECORDS (OUTPATIENT)
Dept: HEALTH INFORMATION MANAGEMENT | Facility: CLINIC | Age: 51
End: 2021-03-16

## 2021-04-05 ENCOUNTER — AMBULATORY - HEALTHEAST (OUTPATIENT)
Dept: NURSING | Facility: CLINIC | Age: 51
End: 2021-04-05

## 2021-04-13 ENCOUNTER — TRANSFERRED RECORDS (OUTPATIENT)
Dept: HEALTH INFORMATION MANAGEMENT | Facility: CLINIC | Age: 51
End: 2021-04-13

## 2021-04-26 ENCOUNTER — AMBULATORY - HEALTHEAST (OUTPATIENT)
Dept: NURSING | Facility: CLINIC | Age: 51
End: 2021-04-26

## 2021-05-07 ENCOUNTER — OFFICE VISIT (OUTPATIENT)
Dept: ENDOCRINOLOGY | Facility: CLINIC | Age: 51
End: 2021-05-07
Payer: COMMERCIAL

## 2021-05-07 VITALS
SYSTOLIC BLOOD PRESSURE: 130 MMHG | HEIGHT: 64 IN | HEART RATE: 81 BPM | BODY MASS INDEX: 21.78 KG/M2 | DIASTOLIC BLOOD PRESSURE: 70 MMHG | WEIGHT: 127.6 LBS

## 2021-05-07 DIAGNOSIS — E10.40 TYPE 1 DIABETES MELLITUS WITH DIABETIC NEUROPATHY (H): ICD-10-CM

## 2021-05-07 DIAGNOSIS — E10.3599 TYPE 1 DIABETES MELLITUS WITH PROLIFERATIVE RETINOPATHY, MACULAR EDEMA PRESENCE UNSPECIFIED, UNSPECIFIED LATERALITY, UNSPECIFIED PROLIFERATIVE RETINOPATHY TYPE (H): Primary | ICD-10-CM

## 2021-05-07 PROCEDURE — 95251 CONT GLUC MNTR ANALYSIS I&R: CPT | Performed by: INTERNAL MEDICINE

## 2021-05-07 PROCEDURE — 99214 OFFICE O/P EST MOD 30 MIN: CPT | Performed by: INTERNAL MEDICINE

## 2021-05-07 ASSESSMENT — MIFFLIN-ST. JEOR: SCORE: 1175.85

## 2021-05-07 NOTE — PATIENT INSTRUCTIONS
Continue diabetes medications as:  Lantus Solostar                      15U each morning  Fiasp Flextouch                      1:13 ratio (typically 8Uper meal)                                                  sscale 1U per 40>125                                                  target  mg/dl    Use the Insulin [employer] merry as a dose calculator, if helpful  If premeal sensor glucose at low end of range, reduce the Fiasp dose by 1-2 units  No lab tests ordered at this time  Continue use of the Freestyle Libre2 sensor  Continue the losartan blood pressure medication daily dose  Continue current simvastatin lipid medication dose    Contact Dr. Lowe if questions about diabetes management

## 2021-05-07 NOTE — LETTER
5/7/2021         RE: Perlita Chavez  [address]  [place] [address]        Dear Colleague,    Thank you for referring your patient, Perlita Chavez, to the [facility] [place]. Please see a copy of my visit note below.    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 Diagnosis of diabetes mellitus age 2 1/2, living in [facility]  Initial treatment with NPH and Regular insulin medications  Had seen Dr. Sarah Sierra/[facility]  Switched to MDI insulin plan using Nv and Levemir insulin     8/19/14. Initial consult with me at my former clinic ([facility])  8/20/14. CDE RD evaluation, also tried demo OmniPod pump  Used Novolog Echo 1/2U pen, with I:C carb counting method  Tried Lantus BID dosing, then Tresiba (expensive), then Basaglar              Previously used Novolog insulin  5/2018. Switched to Fiasp 3/2018, then Fiasp non-formulary 2019  Meals typically brunch 10am and supper 9:30pm  Current DM meds:  Lantus Solostar                       18U each morning  Fiasp Flextouch                      1:15 ratio (typically 8Uper meal)                                                  sscale 1U per 40>150                                                  target  mg/dl     Using Accucheck Guide BG meter              Tests 4x/day              Does own I:C and ISF calculations  9/2019. She has worn a free sample LibrePersonal continuous glucose sensor              Started Freestyle Mary CGM, then upgraded to Libre2 CGM with [place]    Recent Freestyle Libre2 data:           Previous [facility] labs include:  Lab Results   Component Value Date    A1C 7.3 (H) 02/19/2021     (H) 02/19/2021    POTASSIUM 3.9 02/19/2021    CHLORIDE 111 (H) 02/19/2021    CO2 31 02/19/2021     ANIONGAP 3 02/19/2021    GLC 82 02/19/2021    BUN 14 02/19/2021    CR 0.89 02/19/2021    GFRESTIMATED 75 02/19/2021    GFRESTBLACK 87 02/19/2021    FINESSE 9.7 02/19/2021    CHOL 159 12/04/2020    TRIG 180 (H) 12/04/2020    HDL 65 12/04/2020    LDL 58 12/04/2020    NHDL 94 12/04/2020    UCRR 256 09/16/2020    MICROL 17 09/16/2020    UMALCR 6.56 09/16/2020     No history of vascular disease.  Takes simvastatin for hyperlipidemia management.  DM Complications:              Retinopathy                          Previous proliferative DR and bilateral laser PC surgeries                          Had cataracts, retinal detachment repair OD, higher occular pressures                          Significant vision loss, needs print magnification                          Sees Fer Davison and EBER Trevizo/ophthalmology, last eye exam with Dr. 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 Humberto Melendez/[facility] for general medicine evaluations.  Also sees Dr. Blanco/[facility] rheumatology        ROS: 10 point ROS neg other than the symptoms noted above in the HPI.     GENERAL: some fatigue; weight stable; denies fevers, chills, night sweats.   HEENT: minimal/no vision from right eye, dry eyes/mouth; no dysphagia, odonophagia, diplopia  THYROID:  no apparent hyper or hypothyroid symptoms  CV: no chest pain, pressure, palpitations  LUNGS: no SOB, FUENTES, cough, wheezing   ABDOMEN: rare morning nausea; no diarrhea, constipation, abdominal pain  EXTREMITIES: no rashes, ulcers, edema  NEUROLOGY: decreased sensation at feet; no headaches, denies changes in vision, tingling, extremitiy numbness   MSK: mild hand tendinitis pains; no muscle aches, weakness  SKIN: denies rashes or lesions/foot sores  :  no menses since stopping OCP  "medication 1/2021  PSYCH:  stable mood, no significant anxiety or depression  ENDOCRINE: intermittent hot flashes      Physical Exam   VS: /70   Pulse 81   Ht 1.613 m (5' 3.5\")   Wt 57.9 kg (127 lb 9.6 oz)   BMI 22.25 kg/m    GENERAL: AXOX3, NAD, well dressed, answering questions appropriately, appears stated age.  ENT: no nose swelling or nasal discharge, mouth redness or gum changes.  EYES: eyes grossly normal to inspection, conjunctivae and sclerae normal, no exophthalmos or proptosis  THYROID:  no apparent nodules or goiter  CV:  RRR without murmurs  LUNGS: CTA posteriorly  ABDOMEN: abdomen normal size  EXTREMITIES: normal appearance of feet; no edema, +pedal pulses, no lesions  NEUROLOGY: CN grossly intact, no tremors  MSK: grossly intact  SKIN:  no apparent skin lesions, rash, or edema with visualized skin appearance  PSYCH: mentation appears normal, affect normal/bright, judgement and insight intact,   normal speech and appearance well groomed        LABS:     All pertinent notes, labs, and images personally reviewed by me.      A/P:          Encounter Diagnoses   Name       Type 1 diabetes mellitus with proliferative retinopathy, macular edema presence unspecified, unspecified laterality, unspecified proliferative retinopathy type (H)       Type 1 diabetes mellitus with diabetic neuropathy (H)           Comments:  Reviewed the diabetes and health history  Reviewed and interpreted tests that I previously ordered.   Ordered appropriate tests for the endocrinology disease management.    Management options discussed and implemented after shared medical decision making with the patient.  The T1DM problem is chronic-stable    Plan:  Discussed general issues with the diabetes diagnosis and managemen  We discussed the hgbA1c test which reflects previous overall glucose levels or control  Discussed the importance of blood glucose (BG) testing to assess glucose trends  Discussed use of the Accu-check Guide " BG meter  Provided general overview of the multiple daily injection (MDI) plan using rapid acting mealtime               and longacting insulin medications  Reviewed recent Freestyle Mary CGM glucose sensor data, in detail.     Recommend:  Continue current insulin treatment and diabetes management, with Fiasp mealtime & Lantus:  Lower the Lantus insulin dose and take the Fiasp at beginning of the meal  Continue diabetes medications as:  Lantus Solostar                       15U each morning  Fiasp Flextouch                      1:13 ratio (typically 8Uper meal)                                                  sscale 1U per 40>125                                                  target  mg/dl    Use new Insulin iPhone merry as insulin dose calculator, discussed  If premeal sensor glucose at low end of range, reduce the Fiasp dose by 1-2 units  Continue use of the Freestyle Libre2 sensor  Continue the losartan daily dose  Continue current simvastatin lipid medication   Advise having fasting lipid panel testing and dilated eye examination at least annually     Keep regular follow-up appointments with PCP, GYN, rheumatology  Addressed patient questions today     Total time spent in with the patient evaluation:  25 min  Additional time spent reviewing pertinent lab tests and chart notes, and documentation:  5 min    Follow-up:  8/2021, Return    TI Lowe MD, MS  Endocrinology  [facility]              Again, thank you for allowing me to participate in the care of your patient.        Sincerely,        Saurbah Lowe MD

## 2021-05-07 NOTE — PROGRESS NOTES
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 Diagnosis of diabetes mellitus age 2 1/2, living in [facility]  Initial treatment with NPH and Regular insulin medications  Had seen Dr. Sarah Sierra/[facility]  Switched to MDI insulin plan using Nv and Levemir insulin     8/19/14. Initial consult with me at my former clinic ([facility])  8/20/14. CDE RD evaluation, also tried demo OmniPod pump  Used Novolog Echo 1/2U pen, with I:C carb counting method  Tried Lantus BID dosing, then Tresiba (expensive), then Basaglar              Previously used Novolog insulin  5/2018. Switched to Fiasp 3/2018, then Fiasp non-formulary 2019  Meals typically brunch 10am and supper 9:30pm  Current DM meds:  Lantus Solostar                       18U each morning  Fiasp Flextouch                      1:15 ratio (typically 8Uper meal)                                                  sscale 1U per 40>150                                                  target  mg/dl     Using Accucheck Guide BG meter              Tests 4x/day              Does own I:C and ISF calculations  9/2019. She has worn a free sample LibrePersonal continuous glucose sensor              Started Freestyle Mary CGM, then upgraded to Libre2 CGM with [place]    Recent Freestyle Libre2 data:           Previous [facility] labs include:  Lab Results   Component Value Date    A1C 7.3 (H) 02/19/2021     (H) 02/19/2021    POTASSIUM 3.9 02/19/2021    CHLORIDE 111 (H) 02/19/2021    CO2 31 02/19/2021    ANIONGAP 3 02/19/2021    GLC 82 02/19/2021    BUN 14 02/19/2021    CR 0.89 02/19/2021    GFRESTIMATED 75 02/19/2021    GFRESTBLACK 87 02/19/2021    FINESSE 9.7 02/19/2021    CHOL 159 12/04/2020    TRIG 180 (H) 12/04/2020    HDL 65 12/04/2020    LDL 58 12/04/2020     "NHDL 94 12/04/2020    UCRR 256 09/16/2020    MICROL 17 09/16/2020    UMALCR 6.56 09/16/2020     No history of vascular disease.  Takes simvastatin for hyperlipidemia management.  DM Complications:              Retinopathy                          Previous proliferative DR and bilateral laser PC surgeries                          Had cataracts, retinal detachment repair OD, higher occular pressures                          Significant vision loss, needs print magnification                          Sees Fer Davison and EBER Trevizo/ophthalmology, last eye exam with Dr. Davison 1/2019              Neuropathy:                          Decreased sensation in feet        Single, works at retail job at [employer]  Exercises at [employer], enjoys exercise classes  Sees Dr. Humberto Melendez/[facility] for general medicine evaluations.  Also sees Dr. Blanco/[facility] rheumatology        ROS: 10 point ROS neg other than the symptoms noted above in the HPI.     GENERAL: some fatigue; weight stable; denies fevers, chills, night sweats.   HEENT: minimal/no vision from right eye, dry eyes/mouth; no dysphagia, odonophagia, diplopia  THYROID:  no apparent hyper or hypothyroid symptoms  CV: no chest pain, pressure, palpitations  LUNGS: no SOB, FUENTES, cough, wheezing   ABDOMEN: rare morning nausea; no diarrhea, constipation, abdominal pain  EXTREMITIES: no rashes, ulcers, edema  NEUROLOGY: decreased sensation at feet; no headaches, denies changes in vision, tingling, extremitiy numbness   MSK: mild hand tendinitis pains; no muscle aches, weakness  SKIN: denies rashes or lesions/foot sores  :  no menses since stopping OCP medication 1/2021  PSYCH:  stable mood, no significant anxiety or depression  ENDOCRINE: intermittent hot flashes      Physical Exam   VS: /70   Pulse 81   Ht 1.613 m (5' 3.5\")   Wt 57.9 kg (127 lb 9.6 oz)   BMI 22.25 kg/m    GENERAL: AXOX3, NAD, well dressed, " answering questions appropriately, appears stated age.  ENT: no nose swelling or nasal discharge, mouth redness or gum changes.  EYES: eyes grossly normal to inspection, conjunctivae and sclerae normal, no exophthalmos or proptosis  THYROID:  no apparent nodules or goiter  CV:  RRR without murmurs  LUNGS: CTA posteriorly  ABDOMEN: abdomen normal size  EXTREMITIES: normal appearance of feet; no edema, +pedal pulses, no lesions  NEUROLOGY: CN grossly intact, no tremors  MSK: grossly intact  SKIN:  no apparent skin lesions, rash, or edema with visualized skin appearance  PSYCH: mentation appears normal, affect normal/bright, judgement and insight intact,   normal speech and appearance well groomed        LABS:     All pertinent notes, labs, and images personally reviewed by me.      A/P:          Encounter Diagnoses   Name       Type 1 diabetes mellitus with proliferative retinopathy, macular edema presence unspecified, unspecified laterality, unspecified proliferative retinopathy type (H)       Type 1 diabetes mellitus with diabetic neuropathy (H)           Comments:  Reviewed the diabetes and health history  Reviewed and interpreted tests that I previously ordered.   Ordered appropriate tests for the endocrinology disease management.    Management options discussed and implemented after shared medical decision making with the patient.  The T1DM problem is chronic-stable    Plan:  Discussed general issues with the diabetes diagnosis and managemen  We discussed the hgbA1c test which reflects previous overall glucose levels or control  Discussed the importance of blood glucose (BG) testing to assess glucose trends  Discussed use of the Accu-check Guide BG meter  Provided general overview of the multiple daily injection (MDI) plan using rapid acting mealtime               and longacting insulin medications  Reviewed recent Freestyle Mary CGM glucose sensor data, in detail.     Recommend:  Continue current insulin  treatment and diabetes management, with Fiasp mealtime & Lantus:  Lower the Lantus insulin dose and take the Fiasp at beginning of the meal  Continue diabetes medications as:  Lantus Solostar                       15U each morning  Fiasp Flextouch                      1:13 ratio (typically 8Uper meal)                                                  sscale 1U per 40>125                                                  target  mg/dl    Use new Insulin iPhone merry as insulin dose calculator, discussed  If premeal sensor glucose at low end of range, reduce the Fiasp dose by 1-2 units  Continue use of the Freestyle Libre2 sensor  Continue the losartan daily dose  Continue current simvastatin lipid medication   Advise having fasting lipid panel testing and dilated eye examination at least annually     Keep regular follow-up appointments with PCP, GYN, rheumatology  Addressed patient questions today     Total time spent in with the patient evaluation:  25 min  Additional time spent reviewing pertinent lab tests and chart notes, and documentation:  5 min    Follow-up:  8/2021, Return    TI Lowe MD, MS  Endocrinology  [facility]

## 2021-05-17 DIAGNOSIS — E10.3559 TYPE 1 DIABETES MELLITUS WITH STABLE PROLIFERATIVE RETINOPATHY, UNSPECIFIED LATERALITY (H): ICD-10-CM

## 2021-05-17 DIAGNOSIS — E10.3599 TYPE 1 DIABETES MELLITUS WITH PROLIFERATIVE RETINOPATHY, MACULAR EDEMA PRESENCE UNSPECIFIED, UNSPECIFIED LATERALITY, UNSPECIFIED PROLIFERATIVE RETINOPATHY TYPE (H): ICD-10-CM

## 2021-05-17 NOTE — TELEPHONE ENCOUNTER
Last Written Prescription Date:  10/1/2020  Last Fill Quantity: 15 mL,  # refills: 1   Last office visit: 5/7/2021 with prescribing provider:  5/7/2021   Future Office Visit:   Next 5 appointments (look out 90 days)    Aug 13, 2021 11:30 AM  Return Visit with Saurabh Lowe MD  [facility] ([facility] ) [address] [place] [id number]  [phone]         Requested Prescriptions   Pending Prescriptions Disp Refills     insulin glargine (LANTUS SOLOSTAR) 100 UNIT/ML pen 15 mL 1     Sig: INJECT 22 TO 24 UNITS SUBCUTANEOUS DAILY AS DIRECTED.       There is no refill protocol information for this order

## 2021-06-04 ENCOUNTER — TRANSFERRED RECORDS (OUTPATIENT)
Dept: HEALTH INFORMATION MANAGEMENT | Facility: CLINIC | Age: 51
End: 2021-06-04

## 2021-06-04 LAB — RETINOPATHY: NEGATIVE

## 2021-06-18 ENCOUNTER — TRANSFERRED RECORDS (OUTPATIENT)
Dept: HEALTH INFORMATION MANAGEMENT | Facility: CLINIC | Age: 51
End: 2021-06-18

## 2021-06-18 LAB — RETINOPATHY: POSITIVE

## 2021-07-13 ENCOUNTER — TRANSFERRED RECORDS (OUTPATIENT)
Dept: HEALTH INFORMATION MANAGEMENT | Facility: CLINIC | Age: 51
End: 2021-07-13

## 2021-07-13 LAB
ALT SERPL-CCNC: 23 IU/L (ref 5–35)
AST SERPL-CCNC: 29 U/L (ref 5–34)
CREATININE (EXTERNAL): 0.95 MG/DL (ref 0.5–1.3)

## 2021-07-14 DIAGNOSIS — E10.3559 TYPE 1 DIABETES MELLITUS WITH STABLE PROLIFERATIVE RETINOPATHY, UNSPECIFIED LATERALITY (H): ICD-10-CM

## 2021-07-14 DIAGNOSIS — E10.3599 TYPE 1 DIABETES MELLITUS WITH PROLIFERATIVE RETINOPATHY, MACULAR EDEMA PRESENCE UNSPECIFIED, UNSPECIFIED LATERALITY, UNSPECIFIED PROLIFERATIVE RETINOPATHY TYPE (H): ICD-10-CM

## 2021-08-13 ENCOUNTER — OFFICE VISIT (OUTPATIENT)
Dept: ENDOCRINOLOGY | Facility: CLINIC | Age: 51
End: 2021-08-13
Payer: COMMERCIAL

## 2021-08-13 VITALS
HEIGHT: 64 IN | BODY MASS INDEX: 20.83 KG/M2 | WEIGHT: 122 LBS | SYSTOLIC BLOOD PRESSURE: 126 MMHG | HEART RATE: 82 BPM | DIASTOLIC BLOOD PRESSURE: 73 MMHG

## 2021-08-13 DIAGNOSIS — E10.40 TYPE 1 DIABETES MELLITUS WITH DIABETIC NEUROPATHY (H): ICD-10-CM

## 2021-08-13 DIAGNOSIS — E10.3559 TYPE 1 DIABETES MELLITUS WITH STABLE PROLIFERATIVE RETINOPATHY, UNSPECIFIED LATERALITY (H): Primary | ICD-10-CM

## 2021-08-13 PROCEDURE — 95251 CONT GLUC MNTR ANALYSIS I&R: CPT | Performed by: INTERNAL MEDICINE

## 2021-08-13 PROCEDURE — 99214 OFFICE O/P EST MOD 30 MIN: CPT | Performed by: INTERNAL MEDICINE

## 2021-08-13 ASSESSMENT — MIFFLIN-ST. JEOR: SCORE: 1150.45

## 2021-08-13 NOTE — PROGRESS NOTES
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 Diagnosis of diabetes mellitus age 2 1/2, living in [facility]  Initial treatment with NPH and Regular insulin medications  Had seen Dr. Sarah Sierra/[facility]  Switched to MDI insulin plan using Nv and Levemir insulin     8/19/14. Initial consult with me at my former clinic ([facility])  8/20/14. CDE RD evaluation, also tried demo OmniPod pump  Used Novolog Echo 1/2U pen, with I:C carb counting method  Tried Lantus BID dosing, then Tresiba (expensive), then Basaglar              Previously used Novolog insulin  5/2018. Switched to Fiasp 3/2018, then Fiasp non-formulary 2019  Meals typically brunch 10am and supper 9:30pm  Takes Prednisone for RA, current dose 4 mg QAM    Current DM meds:  Lantus Solostar                       15U each morning  Fiasp Flextouch                      1:13 ratio (typically 8Uper meal)                                                  sscale 1U per 40>125                                                  target  mg/dl     Using Accucheck Guide BG meter              Tests 4x/day              Does own I:C and ISF calculations  9/2019. She has worn a free sample LibrePersonal continuous glucose sensor              Started Freestyle Mary CGM, then upgraded to Libre2 CGM with [place]    Recent Freestyle Libre2 data:               Previous [facility] labs include:  Lab Results   Component Value Date    A1C 7.3 (H) 02/19/2021     (H) 02/19/2021    POTASSIUM 3.9 02/19/2021    CHLORIDE 111 (H) 02/19/2021    CO2 31 02/19/2021    ANIONGAP 3 02/19/2021    GLC 82 02/19/2021    BUN 14 02/19/2021    CR 0.89 02/19/2021     GFRESTIMATED 75 02/19/2021    GFRESTBLACK 87 02/19/2021    FINESSE 9.7 02/19/2021    CHOL 159 12/04/2020    TRIG 180 (H) 12/04/2020    HDL 65 12/04/2020    LDL 58 12/04/2020    NHDL 94 12/04/2020    UCRR 256 09/16/2020    MICROL 17 09/16/2020    UMALCR 6.56 09/16/2020     No history of vascular disease.  Takes simvastatin for hyperlipidemia management.  DM Complications:              Retinopathy                          Previous proliferative DR and bilateral laser PC surgeries                          Had cataracts, retinal detachment repair OD, higher occular pressures                          Significant vision loss, needs print magnification                          Sees Fer Davison and EBER Trevizo/ophthalmology, last eye exam with Dr. 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 Humberto Melendez/[facility] for general medicine evaluations.  Also sees Dr. Blanco/[facility] rheumatology        ROS: 10 point ROS neg other than the symptoms noted above in the HPI.     GENERAL: some fatigue; weight stable; denies fevers, chills, night sweats.   HEENT: minimal/no vision from right eye, dry eyes/mouth; no dysphagia, odonophagia, diplopia  THYROID:  no apparent hyper or hypothyroid symptoms  CV: no chest pain, pressure, palpitations  LUNGS: no SOB, FUENTES, cough, wheezing   ABDOMEN: rare morning nausea; no diarrhea, constipation, abdominal pain  EXTREMITIES: no rashes, ulcers, edema  NEUROLOGY: decreased sensation at feet; no headaches, denies changes in vision, tingling, extremitiy numbness   MSK: mild hand tendinitis pains; no muscle aches, weakness  SKIN: denies rashes or lesions/foot sores  :  no menses since stopping OCP medication 1/2021  PSYCH:  stable mood, no significant anxiety or depression  ENDOCRINE:  "intermittent hot flashes      Physical Exam   VS: /73   Pulse 82   Ht 1.613 m (5' 3.5\")   Wt 55.3 kg (122 lb)   BMI 21.27 kg/m    GENERAL: AXOX3, NAD, well dressed, answering questions appropriately, appears stated age.  ENT: no nose swelling or nasal discharge, mouth redness or gum changes.  EYES: eyes grossly normal to inspection, conjunctivae and sclerae normal, no exophthalmos or proptosis  THYROID:  no apparent nodules or goiter  CV:  RRR without murmurs  LUNGS: CTA posteriorly  ABDOMEN: abdomen normal size  EXTREMITIES: normal appearance of feet; no edema, +pedal pulses, no lesions  NEUROLOGY: CN grossly intact, no tremors  MSK: grossly intact  SKIN:  no apparent skin lesions, rash, or edema with visualized skin appearance  PSYCH: mentation appears normal, affect normal/bright, judgement and insight intact,   normal speech and appearance well groomed        LABS:     All pertinent notes, labs, and images personally reviewed by me.        A/P:  Encounter Diagnoses   Name Primary?     Type 1 diabetes mellitus with stable proliferative retinopathy, unspecified laterality (H) Yes     Type 1 diabetes mellitus with diabetic neuropathy (H)        Comments:  Reviewed the diabetes and health history  Reviewed and interpreted tests that I previously ordered.   Ordered appropriate tests for the endocrinology disease management.    Management options discussed and implemented after shared medical decision making with the patient.  The T1DM problem is chronic-stable    Plan:  Discussed general issues with the diabetes diagnosis and managemen  We discussed the hgbA1c test which reflects previous overall glucose levels or control  Discussed the importance of blood glucose (BG) testing to assess glucose trends  Discussed use of the Accu-check Guide BG meter  Provided general overview of the multiple daily injection (MDI) plan using rapid acting mealtime               and longacting insulin medications  Reviewed " recent Freestyle Mary CGM glucose sensor data, in detail.     Recommend:  Continue current insulin treatment and diabetes management, with Fiasp mealtime & Lantus:  Lower the Lantus insulin dose and take the Fiasp at beginning of the meal  Use the [place] merry as insulin dose calculator, discussed  If premeal sensor glucose at low end of range, reduce the Fiasp dose by 1-2 units  Continue use of the Freestyle Libre2 sensor  Continue the losartan daily dose  Continue current simvastatin lipid medication   Advise having fasting lipid panel testing and dilated eye examination at least annually     Keep regular follow-up appointments with PCP, GYN, rheumatology  Addressed patient questions today     Patient Instructions     Diabetes medication plan:  Lantus Solostar  14U each morning  Fiasp Flextouch                      Brunch 1:13      snacks with carb 1:13      Supper 1:12  Fiasp correction scale: 1U per 50 > 125   Glucose  Correction   125-174  +1 unit   175- 225  +2 units   226- 275  +3 units   276-325  +4 units   326-375  +5 units   376-425  + 6 units    Continue use of the Freestyle Libre2 sensors   Use sensor glucose level for Fiasp dosing  Contact Dr. Lowe and the CDE's if questions        Future labs ordered today:   Orders Placed This Encounter   Procedures     GLUCOSE MONITOR, 72 HOUR, PHYS INTERP     Radiology/Consults ordered today: None    Total time spent in with the patient evaluation:  25 min  Additional time spent reviewing pertinent lab tests and chart notes, and documentation:  10 min    Follow-up:  12/2021    TI Lowe MD, MS  Endocrinology  [facility]

## 2021-08-13 NOTE — LETTER
8/13/2021         RE: Perlita Chavez  [address]  [facility] [address]        Dear Colleague,    Thank you for referring your patient, Perlita Chavez, to the [facility] [place]. Please see a copy of my visit note below.    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 Diagnosis of diabetes mellitus age 2 1/2, living in [facility]  Initial treatment with NPH and Regular insulin medications  Had seen Dr. Sarah Sierra/ Endocrinology  Switched to MDI insulin plan using Nv and Levemir insulin     8/19/14. Initial consult with me at my former clinic ([facility])  8/20/14. CDE RD evaluation, also tried demo OmniPod pump  Used Novolog Echo 1/2U pen, with I:C carb counting method  Tried Lantus BID dosing, then Tresiba (expensive), then Basaglar              Previously used Novolog insulin  5/2018. Switched to Fiasp 3/2018, then Fiasp non-formulary 2019  Meals typically brunch 10am and supper 9:30pm  Takes Prednisone for RA, current dose 4 mg QAM    Current DM meds:  Lantus Solostar                       15U each morning  Fiasp Flextouch                      1:13 ratio (typically 8Uper meal)                                                  sscale 1U per 40>125                                                  target  mg/dl     Using Accucheck Guide BG meter              Tests 4x/day              Does own I:C and ISF calculations  9/2019. She has worn a free sample LibrePersonal continuous glucose sensor              Started Freestyle Mayr CGM, then upgraded to Libre2 CGM with [place]    Recent Freestyle Libre2 data:               Previous [facility] labs  include:  Lab Results   Component Value Date    A1C 7.3 (H) 02/19/2021     (H) 02/19/2021    POTASSIUM 3.9 02/19/2021    CHLORIDE 111 (H) 02/19/2021    CO2 31 02/19/2021    ANIONGAP 3 02/19/2021    GLC 82 02/19/2021    BUN 14 02/19/2021    CR 0.89 02/19/2021    GFRESTIMATED 75 02/19/2021    GFRESTBLACK 87 02/19/2021    FINESSE 9.7 02/19/2021    CHOL 159 12/04/2020    TRIG 180 (H) 12/04/2020    HDL 65 12/04/2020    LDL 58 12/04/2020    NHDL 94 12/04/2020    UCRR 256 09/16/2020    MICROL 17 09/16/2020    UMALCR 6.56 09/16/2020     No history of vascular disease.  Takes simvastatin for hyperlipidemia management.  DM Complications:              Retinopathy                          Previous proliferative DR and bilateral laser PC surgeries                          Had cataracts, retinal detachment repair OD, higher occular pressures                          Significant vision loss, needs print magnification                          Sees Fer Davison and EBER Trevizo/ophthalmology, last eye exam with Dr. 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 Humberto Melendez/[facility] for general medicine evaluations.  Also sees Dr. Blanco/[facility] rheumatology        ROS: 10 point ROS neg other than the symptoms noted above in the HPI.     GENERAL: some fatigue; weight stable; denies fevers, chills, night sweats.   HEENT: minimal/no vision from right eye, dry eyes/mouth; no dysphagia, odonophagia, diplopia  THYROID:  no apparent hyper or hypothyroid symptoms  CV: no chest pain, pressure, palpitations  LUNGS: no SOB, FUENTES, cough, wheezing   ABDOMEN: rare morning nausea; no diarrhea, constipation, abdominal pain  EXTREMITIES: no rashes, ulcers, edema  NEUROLOGY: decreased sensation at feet; no headaches, denies changes in  "vision, tingling, extremitiy numbness   MSK: mild hand tendinitis pains; no muscle aches, weakness  SKIN: denies rashes or lesions/foot sores  :  no menses since stopping OCP medication 1/2021  PSYCH:  stable mood, no significant anxiety or depression  ENDOCRINE: intermittent hot flashes      Physical Exam   VS: /73   Pulse 82   Ht 1.613 m (5' 3.5\")   Wt 55.3 kg (122 lb)   BMI 21.27 kg/m    GENERAL: AXOX3, NAD, well dressed, answering questions appropriately, appears stated age.  ENT: no nose swelling or nasal discharge, mouth redness or gum changes.  EYES: eyes grossly normal to inspection, conjunctivae and sclerae normal, no exophthalmos or proptosis  THYROID:  no apparent nodules or goiter  CV:  RRR without murmurs  LUNGS: CTA posteriorly  ABDOMEN: abdomen normal size  EXTREMITIES: normal appearance of feet; no edema, +pedal pulses, no lesions  NEUROLOGY: CN grossly intact, no tremors  MSK: grossly intact  SKIN:  no apparent skin lesions, rash, or edema with visualized skin appearance  PSYCH: mentation appears normal, affect normal/bright, judgement and insight intact,   normal speech and appearance well groomed        LABS:     All pertinent notes, labs, and images personally reviewed by me.        A/P:  Encounter Diagnoses   Name Primary?     Type 1 diabetes mellitus with stable proliferative retinopathy, unspecified laterality (H) Yes     Type 1 diabetes mellitus with diabetic neuropathy (H)        Comments:  Reviewed the diabetes and health history  Reviewed and interpreted tests that I previously ordered.   Ordered appropriate tests for the endocrinology disease management.    Management options discussed and implemented after shared medical decision making with the patient.  The T1DM problem is chronic-stable    Plan:  Discussed general issues with the diabetes diagnosis and managemen  We discussed the hgbA1c test which reflects previous overall glucose levels or control  Discussed the " importance of blood glucose (BG) testing to assess glucose trends  Discussed use of the Accu-check Guide BG meter  Provided general overview of the multiple daily injection (MDI) plan using rapid acting mealtime               and longacting insulin medications  Reviewed recent Freestyle Mary CGM glucose sensor data, in detail.     Recommend:  Continue current insulin treatment and diabetes management, with Fiasp mealtime & Lantus:  Lower the Lantus insulin dose and take the Fiasp at beginning of the meal  Use the [place] merry as insulin dose calculator, discussed  If premeal sensor glucose at low end of range, reduce the Fiasp dose by 1-2 units  Continue use of the Freestyle Libre2 sensor  Continue the losartan daily dose  Continue current simvastatin lipid medication   Advise having fasting lipid panel testing and dilated eye examination at least annually     Keep regular follow-up appointments with PCP, GYN, rheumatology  Addressed patient questions today     Patient Instructions     Diabetes medication plan:  Lantus Solostar  14U each morning  Fiasp Flextouch                      Brunch 1:13      snacks with carb 1:13      Supper 1:12  Fiasp correction scale: 1U per 50 > 125   Glucose  Correction   125-174  +1 unit   175- 225  +2 units   226- 275  +3 units   276-325  +4 units   326-375  +5 units   376-425  + 6 units    Continue use of the Freestyle Libre2 sensors   Use sensor glucose level for Fiasp dosing  Contact Dr. Lowe and the CDE's if questions        Future labs ordered today:   Orders Placed This Encounter   Procedures     GLUCOSE MONITOR, 72 HOUR, PHYS INTERP     Radiology/Consults ordered today: None    Total time spent in with the patient evaluation:  25 min  Additional time spent reviewing pertinent lab tests and chart notes, and documentation:  10 min    Follow-up:  12/2021    TI Lowe MD, MS  Endocrinology  [facility]                Again, thank you for allowing me to participate in the  care of your patient.        Sincerely,        Saurabh Lowe MD

## 2021-08-13 NOTE — PATIENT INSTRUCTIONS
Diabetes medication plan:  Lantus Solostar  14U each morning  Fiasp Flextouch                      [place] 1:13      snacks with carb 1:13      Supper 1:12  Fiasp correction scale: 1U per 50 > 125   Glucose  Correction   125-174  +1 unit   175- 225  +2 units   226- 275  +3 units   276-325  +4 units   326-375  +5 units   376-425  + 6 units    Continue use of the Freestyle Libre2 sensors   Use sensor glucose level for Fiasp dosing  Contact Dr. Lowe and the CDE's if questions

## 2021-09-04 ENCOUNTER — HEALTH MAINTENANCE LETTER (OUTPATIENT)
Age: 51
End: 2021-09-04

## 2021-09-25 DIAGNOSIS — E10.3559 TYPE 1 DIABETES MELLITUS WITH STABLE PROLIFERATIVE RETINOPATHY, UNSPECIFIED LATERALITY (H): ICD-10-CM

## 2021-09-25 DIAGNOSIS — E10.3599 TYPE 1 DIABETES MELLITUS WITH PROLIFERATIVE RETINOPATHY, MACULAR EDEMA PRESENCE UNSPECIFIED, UNSPECIFIED LATERALITY, UNSPECIFIED PROLIFERATIVE RETINOPATHY TYPE (H): ICD-10-CM

## 2021-10-05 DIAGNOSIS — E10.3559 TYPE 1 DIABETES MELLITUS WITH STABLE PROLIFERATIVE RETINOPATHY, UNSPECIFIED LATERALITY (H): Primary | ICD-10-CM

## 2021-10-06 NOTE — TELEPHONE ENCOUNTER
Last Written Prescription Date:  9/14/2020  Last Fill Quantity: 2,  # refills: 11   Last office visit: 8/13/2021 with prescribing provider:  Dr. Lowe    Future Office Visit:   Next 5 appointments (look out 90 days)    Dec 21, 2021 11:30 AM  Return Visit with Saurabh Lowe MD  [facility] ([facility] ) [address] [place] [id number] [phone]

## 2021-11-16 ENCOUNTER — TRANSFERRED RECORDS (OUTPATIENT)
Dept: HEALTH INFORMATION MANAGEMENT | Facility: CLINIC | Age: 51
End: 2021-11-16
Payer: COMMERCIAL

## 2021-11-16 LAB
ALT SERPL-CCNC: 24 IU/L (ref 5–35)
AST SERPL-CCNC: 28 U/L (ref 5–34)
CREATININE (EXTERNAL): 0.8 MG/DL (ref 0.5–1.3)
GFR ESTIMATED (EXTERNAL): 80.4 ML/MIN/1.73M2

## 2021-11-24 ENCOUNTER — TELEPHONE (OUTPATIENT)
Dept: ENDOCRINOLOGY | Facility: CLINIC | Age: 51
End: 2021-11-24
Payer: COMMERCIAL

## 2021-11-26 DIAGNOSIS — E10.3599 TYPE 1 DIABETES MELLITUS WITH PROLIFERATIVE RETINOPATHY, MACULAR EDEMA PRESENCE UNSPECIFIED, UNSPECIFIED LATERALITY, UNSPECIFIED PROLIFERATIVE RETINOPATHY TYPE (H): ICD-10-CM

## 2021-11-26 NOTE — TELEPHONE ENCOUNTER
Fiasp insulin denial noted.  We also received this denial in 2020 and the appeal was successful.    Regarding: Fiasp insulin PA     I have previously seen Ms Perlita Chavez ( 70, ID# [id number]) for evaluation and management of Type 1 diabetes mellitus.  She has used the Fiasp insulin pens for management.  I was recently told they were denied by her insurance plan (again) and I am requesting another PA.     The Fiasp insulin has a more rapid onset of action than the Novolog or Humalog insulins.  This allows for better control of the postmeal glucose rise (hyperglycemia).  Ms Chavez has used Fiasp insulin and it has been much more effective for her diabetes glucose management.  The Fiasp is medically necessary.     Please assist with the insurance appeal, thanks.     TI Lowe MD, [facility]

## 2021-11-26 NOTE — TELEPHONE ENCOUNTER
PA Initiation    Medication: insulin aspart (FIASP FLEXTOUCH) 100 UNIT/ML pen-injector  Insurance Company: [employer] - Phone [phone] Fax [phone]  Pharmacy Filling the Rx: [id number] [place], [place] - [facility] [facility] [address]  Filling Pharmacy Phone: [phone]  Filling Pharmacy Fax:    Start Date: 11/26/2021    Central Prior Authorization Team   Phone: [phone]

## 2021-11-26 NOTE — TELEPHONE ENCOUNTER
Prior Authorization Approval    Authorization Effective Date: 11/21/2021  Authorization Expiration Date: 11/26/2022  Medication: insulin aspart (FIASP FLEXTOUCH) 100 UNIT/ML pen-injector  Approved Dose/Quantity: 15ml  Reference #:     Insurance Company: [employer] - Phone [phone] Fax [phone]  Which Pharmacy is filling the prescription (Not needed for infusion/clinic administered): [id number] [place] [address] AT [facility] [address]  Pharmacy Notified: Yes **Instructed pharmacy to notify patient when script is ready to /ship.**  Patient Notified: Yes

## 2021-11-28 DIAGNOSIS — E78.5 HYPERLIPIDEMIA LDL GOAL <100: ICD-10-CM

## 2021-11-29 ENCOUNTER — MYC MEDICAL ADVICE (OUTPATIENT)
Dept: INTERNAL MEDICINE | Facility: CLINIC | Age: 51
End: 2021-11-29
Payer: COMMERCIAL

## 2021-11-29 ENCOUNTER — TELEPHONE (OUTPATIENT)
Dept: INTERNAL MEDICINE | Facility: CLINIC | Age: 51
End: 2021-11-29
Payer: COMMERCIAL

## 2021-11-29 NOTE — TELEPHONE ENCOUNTER
Patient calls stating she is around day 11 of being sick  She's DM and rheumatoid arthritis  Nausea, vomiting and feels dehydrated.  This writer made recommendations of increasing fluids. No shortness of breath noted.  Fatigued     Patient is getting rapid testing today.  She was sent a [employer] message with symptom recommendation for covid if she is positive    Patient states coughing up lots of phlegm but no blood noted    Patient to send [employer] message back later with whether positive or not    Will route to provider for further recommendations if appropriate    thanks

## 2021-11-30 RX ORDER — INSULIN ASPART INJECTION 100 [IU]/ML
INJECTION, SOLUTION SUBCUTANEOUS
Qty: 15 ML | Refills: 5 | Status: SHIPPED | OUTPATIENT
Start: 2021-11-30 | End: 2022-10-14

## 2021-11-30 RX ORDER — SIMVASTATIN 40 MG
TABLET ORAL
Qty: 90 TABLET | Refills: 0 | Status: SHIPPED | OUTPATIENT
Start: 2021-11-30 | End: 2022-02-23

## 2021-11-30 NOTE — TELEPHONE ENCOUNTER
I do recommend getting a Covid test and advised patient to let us know about the results ,agree with RNs advise about sending recommendation if Covid positive

## 2021-12-21 ENCOUNTER — OFFICE VISIT (OUTPATIENT)
Dept: ENDOCRINOLOGY | Facility: CLINIC | Age: 51
End: 2021-12-21
Payer: COMMERCIAL

## 2021-12-21 VITALS
HEART RATE: 93 BPM | BODY MASS INDEX: 19.19 KG/M2 | HEIGHT: 64 IN | WEIGHT: 112.4 LBS | DIASTOLIC BLOOD PRESSURE: 76 MMHG | SYSTOLIC BLOOD PRESSURE: 127 MMHG

## 2021-12-21 DIAGNOSIS — E10.3599 TYPE 1 DIABETES MELLITUS WITH PROLIFERATIVE RETINOPATHY, MACULAR EDEMA PRESENCE UNSPECIFIED, UNSPECIFIED LATERALITY, UNSPECIFIED PROLIFERATIVE RETINOPATHY TYPE (H): Primary | ICD-10-CM

## 2021-12-21 DIAGNOSIS — E10.40 TYPE 1 DIABETES MELLITUS WITH DIABETIC NEUROPATHY (H): ICD-10-CM

## 2021-12-21 PROCEDURE — 99214 OFFICE O/P EST MOD 30 MIN: CPT | Performed by: INTERNAL MEDICINE

## 2021-12-21 PROCEDURE — 95251 CONT GLUC MNTR ANALYSIS I&R: CPT | Performed by: INTERNAL MEDICINE

## 2021-12-21 ASSESSMENT — MIFFLIN-ST. JEOR: SCORE: 1101.9

## 2021-12-21 NOTE — PROGRESS NOTES
Recent issues:  Diabetes follow-up evaluation  Taking the Fiasp and Lantus insulin plan, also using the Freestyle Libre2 CGM sensor  Had COVID-19 illness in mid 11/2021, feeling better now  Continues taking the Remicaid and low dose Prednisone 4 mg daily          1972. Diagnosis of diabetes mellitus age 2 1/2, living in [facility]  Initial treatment with NPH and Regular insulin medications  Had seen Dr. Sarah Sierra/[facility]  Switched to MDI insulin plan using Nv and Levemir insulin     8/19/14. Initial consult with me at my former clinic ([facility])  8/20/14. CDE RD evaluation, also tried demo OmniPod pump  Used Novolog Echo 1/2U pen, with I:C carb counting method  Tried Lantus BID dosing, then Tresiba (expensive), then Basaglar              Previously used Novolog insulin  5/2018. Switched to Fiasp 3/2018, then Fiasp non-formulary 2019  Meals typically brunch 10am and supper 9:30pm  Takes Prednisone for RA, current dose 4 mg QAM  Discussed hypoglycemia treatment options, jelly beans vs glucose tablets    Current DM meds:  Lantus Solostar                      14U each morning  Fiasp Flextouch                      1:13 ratio (typically 8Uper meal)                                                  sscale 1U per 40>125                                                  target  mg/dl     Has InsulinCalculator smart phone merry, but not used  Using Accucheck Guide BG meter              Tests 4x/day              Does own I:C and ISF calculations  9/2019. She has worn a free sample LibrePersonal continuous glucose sensor              Started Freestyle Mary CGM, then upgraded to Libre2 CGM with [place]    Recent Freestyle Libre2 data:          Previous  labs include:  Lab Results   Component Value Date    A1C 7.3 (H) 02/19/2021     (H) 02/19/2021    POTASSIUM 3.9 02/19/2021    CHLORIDE 111 (H) 02/19/2021    CO2 31 02/19/2021    ANIONGAP 3 02/19/2021    GLC 82 02/19/2021    BUN 14 02/19/2021    CR 0.89  02/19/2021    GFRESTIMATED 75 02/19/2021    GFRESTBLACK 87 02/19/2021    FINESSE 9.7 02/19/2021    CHOL 159 12/04/2020    TRIG 180 (H) 12/04/2020    HDL 65 12/04/2020    LDL 58 12/04/2020    NHDL 94 12/04/2020    UCRR 256 09/16/2020    MICROL 17 09/16/2020    UMALCR 6.56 09/16/2020     Lab Results   Component Value Date    TSH 2.48 02/19/2021    T4 0.96 02/18/2011     No history of vascular disease.  Takes simvastatin for hyperlipidemia management.  DM Complications:              Retinopathy                          Previous proliferative DR and bilateral laser PC surgeries                          Had cataracts, retinal detachment repair OD, higher occular pressures                          Significant vision loss, needs print magnification                          Sees Fer Davison and EBER Trevizo/ophthalmology, last eye exam with Dr. 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 Humberto Melendez/[facility] for general medicine evaluations.  Also sees Dr. Blanco/[facility] rheumatology      PMH/PSH:  Past Medical History:   Diagnosis Date     Diabetic retinopathy associated with type 1 diabetes mellitus (H)     proliferative DR and bilateral laser PC surgeries     Hyperlipidemia LDL goal < 130      RA (rheumatoid arthritis) (H)     seeing rheumatologist.     Sjoegren syndrome     seeing rheumatologist.     Type 1 diabetes mellitus (H)     retinopathy, neuropathy     Visual impairment      Past Surgical History:   Procedure Laterality Date     [facility] NONSPECIFIC PROCEDURE  8/00/97    T & A     Z NONSPECIFIC PROCEDURE  5/00/99    L eye surgery     ZZC NONSPECIFIC PROCEDURE  4/10/01    R cataract + IOL     ZZ NONSPECIFIC PROCEDURE  2002    bilat vitrectomy     ZZC NONSPECIFIC PROCEDURE  7/04    R  eye vitrectomy     [facility] NONSPECIFIC PROCEDURE  5/08    R vitrectomy & partial retinal detachment        Family Hx:  Family History   Problem Relation Age of Onset     Arthritis Mother         on celebrex,alive & healthy 2002     Breast Cancer Mother      Arthritis Father         on celebrex(2002)     Heart Disease Father         heart attack 1990, 60 years old (2002)     Diabetes Father      Cerebrovascular Disease Father         age 70     Thyroid Disease Sister         on thyroid med for couple of years , 35 yrs now(2002)     Cancer Paternal Grandmother         STOMACH     Cerebrovascular Disease Paternal Grandfather         age 90     Breast Cancer Cousin 55.00         Social Hx:  Social History     Socioeconomic History     Marital status: Single     Spouse name:      Number of children: 0     Years of education: Not on file     Highest education level: Not on file   Occupational History     Occupation: [employer]     Employer: [employer]   Tobacco Use     Smoking status: Never Smoker     Smokeless tobacco: Never Used   Substance and Sexual Activity     Alcohol use: Yes     Comment: 1 drink every 3 weeks     Drug use: No     Sexual activity: Not Currently     Partners: Male     Comment:    Other Topics Concern     Parent/sibling w/ CABG, MI or angioplasty before 65F 55M? Not Asked   Social History Narrative     Not on file     Social Determinants of Health     Financial Resource Strain: Not on file   Food Insecurity: Not on file   Transportation Needs: Not on file   Physical Activity: Not on file   Stress: Not on file   Social Connections: Not on file   Intimate Partner Violence: Not on file   Housing Stability: Not on file          MEDICATIONS:  has a current medication list which includes the following prescription(s): brimonidine, freestyle thea 2 sensor systm, freestyle thea 2 sensor, cyclosporine, dorzolamide-timolol, ferrous sulfate, folic acid, infliximab, insulin aspart,  "insulin glargine, losartan, methotrexate, fish oil triple strength, one daily multiple vitamin, prednisone, simvastatin, sulfasalazine, valacyclovir, ace/arb/arni not prescribed, aspirin, blood glucose, blood glucose, blood glucose monitoring, blood glucose, continuous blood glucose monitoring, diclofenac, insulin aspart, bd adelfo u/f, and norlyda.        ROS: 10 point ROS neg other than the symptoms noted above in the HPI.     GENERAL: some fatigue; weight stable; denies fevers, chills, night sweats.   HEENT: minimal/no vision from right eye, dry eyes/mouth; no dysphagia, odonophagia, diplopia  THYROID:  no apparent hyper or hypothyroid symptoms  CV: no chest pain, pressure, palpitations  LUNGS: no SOB, FUENTES, cough, wheezing   ABDOMEN: rare morning nausea; no diarrhea, constipation, abdominal pain  EXTREMITIES: no rashes, ulcers, edema  NEUROLOGY: decreased sensation at feet; no headaches, denies changes in vision, tingling, extremitiy numbness   MSK: mild hand tendinitis pains; no muscle aches, weakness  SKIN: denies rashes or lesions/foot sores  :  no menses since stopping OCP medication 1/2021  PSYCH:  stable mood, no significant anxiety or depression  ENDOCRINE: intermittent hot flashes      Physical Exam   VS: /76   Pulse 93   Ht 1.613 m (5' 3.5\")   Wt 51 kg (112 lb 6.4 oz)   BMI 19.60 kg/m     CONSTITUTIONAL: healthy, alert and NAD, well dressed, answering questions appropriately  ENT: no nose swelling or nasal discharge, mouth redness or gum changes.  EYES: eyes grossly normal to inspection, conjunctivae and sclerae normal, no exophthalmos or proptosis  THYROID:  no apparent nodules or goiter  CV:  RRR without murmurs  LUNGS: no audible wheeze, cough or visible cyanosis, no visible retractions or increased work of breathing  ABDOMEN: abdomen not evaluated  EXTREMITIES: no hand tremors, limited exam  NEUROLOGY: CN grossly intact, mentation intact and speech normal   SKIN:  no apparent skin lesions, " rash, or edema with visualized skin appearance  PSYCH: mentation appears normal, affect normal/bright, judgement and insight intact,   normal speech and appearance well groomed    LABS:     All pertinent notes, labs, and images personally reviewed by me.        A/P:  Encounter Diagnoses   Name Primary?     Type 1 diabetes mellitus with proliferative retinopathy, macular edema presence unspecified, unspecified laterality, unspecified proliferative retinopathy type (H) Yes     Type 1 diabetes mellitus with diabetic neuropathy (H)        Comments:  Reviewed the diabetes and health history  Recent SG trends fluctuate but good overall  Reviewed and interpreted tests that I previously ordered.   Ordered appropriate tests for the endocrinology disease management.    Management options discussed and implemented after shared medical decision making with the patient.  T1DM problem is chronic-stable    Plan:  Discussed general issues with the diabetes diagnosis and managemen  We discussed the hgbA1c test which reflects previous overall glucose levels or control  Discussed the importance of blood glucose (BG) testing to assess glucose trends  Discussed use of the Accu-check Guide BG meter  Provided general overview of the multiple daily injection (MDI) plan using rapid acting mealtime               and longacting insulin medications  Reviewed recent Freestyle Libre2 CGM glucose sensor data, in detail.     Recommend:  Continue current insulin treatment and diabetes management, with Fiasp mealtime & Lantus MDI plan  Use the InsulinCalculator [employer] merry as insulin dose calculator, discussed  If premeal sensor glucose at low end of range, reduce the Fiasp dose by 1-2 units  Continue use of the Freestyle Libre2 sensor  Goal target premeal glucose 80- 150 mg/dl  Plan repeat lab tests soon   Discussed option for labs at [facility], with her planned PCP appt   Lab orders placed  Discussed hypoglycemia management, use of  glucose tabs or jelly bean snack  Continue the losartan daily dose  Continue current simvastatin lipid medication   Advise having fasting lipid panel testing and dilated eye examination at least annually     Keep regular follow-up appointments with PCP, GYN, rheumatology  Addressed patient questions today     There are no Patient Instructions on file for this visit.    Future labs ordered today:   Orders Placed This Encounter   Procedures     GLUCOSE MONITOR, 72 HOUR, PHYS INTERP     TSH with free T4 reflex     Basic metabolic panel     Albumin Random Urine Quantitative with Creat Ratio     Hemoglobin A1c     Radiology/Consults ordered today: None    Total time spent in with the patient evaluation:  15 min  Additional time spent reviewing pertinent lab tests and chart notes, and documentation:  5 min    Follow-up:  3/2022, 12 p Return    TI Lowe MD, MS  Endocrinology  [facility]    CC:  CHUY Melendez

## 2021-12-21 NOTE — LETTER
12/21/2021         RE: Perlita Chavez  [address]  [facility] [address]        Dear Colleague,    Thank you for referring your patient, Perlita Chavez, to the [facility] [place]. Please see a copy of my visit note below.    Recent issues:  Diabetes follow-up evaluation  Taking the Fiasp and Lantus insulin plan, also using the Freestyle Libre2 CGM sensor  Had COVID-19 illness in mid 11/2021, feeling better now  Continues taking the Remicaid and low dose Prednisone 4 mg daily          1972. Diagnosis of diabetes mellitus age 2 1/2, living in [facility]  Initial treatment with NPH and Regular insulin medications  Had seen Dr. Sarah Sierra/[facility]  Switched to MDI insulin plan using Nv and Levemir insulin     8/19/14. Initial consult with me at my former clinic ([facility])  8/20/14. CDE RD evaluation, also tried demo OmniPod pump  Used Novolog Echo 1/2U pen, with I:C carb counting method  Tried Lantus BID dosing, then Tresiba (expensive), then Basaglar              Previously used Novolog insulin  5/2018. Switched to Fiasp 3/2018, then Fiasp non-formulary 2019  Meals typically brunch 10am and supper 9:30pm  Takes Prednisone for RA, current dose 4 mg QAM  Discussed hypoglycemia treatment options, jelly beans vs glucose tablets    Current DM meds:  Lantus Solostar                      14U each morning  Fiasp Flextouch                      1:13 ratio (typically 8Uper meal)                                                  sscale 1U per 40>125                                                  target  mg/dl     Has InsulinCalculator smart phone merry, but not used  Using Accucheck Guide BG meter              Tests 4x/day              Does own I:C and ISF calculations  9/2019. She has worn a free sample LibrePersonal continuous glucose sensor              Started Freestyle Mary CGM, then upgraded to Libre2 CGM with [employer]    Recent Freestyle Libre2 data:          Previous [facility]  labs include:  Lab Results   Component Value Date    A1C 7.3 (H) 02/19/2021     (H) 02/19/2021    POTASSIUM 3.9 02/19/2021    CHLORIDE 111 (H) 02/19/2021    CO2 31 02/19/2021    ANIONGAP 3 02/19/2021    GLC 82 02/19/2021    BUN 14 02/19/2021    CR 0.89 02/19/2021    GFRESTIMATED 75 02/19/2021    GFRESTBLACK 87 02/19/2021    FINESSE 9.7 02/19/2021    CHOL 159 12/04/2020    TRIG 180 (H) 12/04/2020    HDL 65 12/04/2020    LDL 58 12/04/2020    NHDL 94 12/04/2020    UCRR 256 09/16/2020    MICROL 17 09/16/2020    UMALCR 6.56 09/16/2020     Lab Results   Component Value Date    TSH 2.48 02/19/2021    T4 0.96 02/18/2011     No history of vascular disease.  Takes simvastatin for hyperlipidemia management.  DM Complications:              Retinopathy                          Previous proliferative DR and bilateral laser PC surgeries                          Had cataracts, retinal detachment repair OD, higher occular pressures                          Significant vision loss, needs print magnification                          Sees Fer Davison and EBER Trevizo/ophthalmology, last eye exam with Dr. 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 Humberto Melendez/[facility] for general medicine evaluations.  Also sees Dr. Blanco/[facility] rheumatology      PMH/PSH:  Past Medical History:   Diagnosis Date     Diabetic retinopathy associated with type 1 diabetes mellitus (H)     proliferative DR and bilateral laser PC surgeries     Hyperlipidemia LDL goal < 130      RA (rheumatoid arthritis) (H)     seeing rheumatologist.     Sjoegren syndrome     seeing rheumatologist.     Type 1 diabetes mellitus (H)     retinopathy, neuropathy     Visual impairment      Past Surgical History:   Procedure Laterality Date      ZZC NONSPECIFIC PROCEDURE  8/00/97    T & A     ZZC NONSPECIFIC PROCEDURE  5/00/99    L eye surgery     ZZC NONSPECIFIC PROCEDURE  4/10/01    R cataract + IOL     ZZC NONSPECIFIC PROCEDURE  2002    bilat vitrectomy     ZZC NONSPECIFIC PROCEDURE  7/04    R eye vitrectomy     ZZC NONSPECIFIC PROCEDURE  5/08    R vitrectomy & partial retinal detachment        Family Hx:  Family History   Problem Relation Age of Onset     Arthritis Mother         on celebrex,alive & healthy 2002     Breast Cancer Mother      Arthritis Father         on celebrex(2002)     Heart Disease Father         heart attack 1990, 60 years old (2002)     Diabetes Father      Cerebrovascular Disease Father         age 70     Thyroid Disease Sister         on thyroid med for couple of years , 35 yrs now(2002)     Cancer Paternal Grandmother         STOMACH     Cerebrovascular Disease Paternal Grandfather         age 90     Breast Cancer Cousin 55.00         Social Hx:  Social History     Socioeconomic History     Marital status: Single     Spouse name:      Number of children: 0     Years of education: Not on file     Highest education level: Not on file   Occupational History     Occupation: [employer]     Employer: [employer]   Tobacco Use     Smoking status: Never Smoker     Smokeless tobacco: Never Used   Substance and Sexual Activity     Alcohol use: Yes     Comment: 1 drink every 3 weeks     Drug use: No     Sexual activity: Not Currently     Partners: Male     Comment:    Other Topics Concern     Parent/sibling w/ CABG, MI or angioplasty before 65F 55M? Not Asked   Social History Narrative     Not on file     Social Determinants of Health     Financial Resource Strain: Not on file   Food Insecurity: Not on file   Transportation Needs: Not on file   Physical Activity: Not on file   Stress: Not on file   Social Connections: Not on file   Intimate Partner Violence: Not on file   Housing Stability: Not on file         "  MEDICATIONS:  has a current medication list which includes the following prescription(s): brimonidine, freestyle thea 2 sensor systm, freestyle thea 2 sensor, cyclosporine, dorzolamide-timolol, ferrous sulfate, folic acid, infliximab, insulin aspart, insulin glargine, losartan, methotrexate, fish oil triple strength, one daily multiple vitamin, prednisone, simvastatin, sulfasalazine, valacyclovir, ace/arb/arni not prescribed, aspirin, blood glucose, blood glucose, blood glucose monitoring, blood glucose, continuous blood glucose monitoring, diclofenac, insulin aspart, bd adelfo u/f, and norlyda.        ROS: 10 point ROS neg other than the symptoms noted above in the HPI.     GENERAL: some fatigue; weight stable; denies fevers, chills, night sweats.   HEENT: minimal/no vision from right eye, dry eyes/mouth; no dysphagia, odonophagia, diplopia  THYROID:  no apparent hyper or hypothyroid symptoms  CV: no chest pain, pressure, palpitations  LUNGS: no SOB, FUENTES, cough, wheezing   ABDOMEN: rare morning nausea; no diarrhea, constipation, abdominal pain  EXTREMITIES: no rashes, ulcers, edema  NEUROLOGY: decreased sensation at feet; no headaches, denies changes in vision, tingling, extremitiy numbness   MSK: mild hand tendinitis pains; no muscle aches, weakness  SKIN: denies rashes or lesions/foot sores  :  no menses since stopping OCP medication 1/2021  PSYCH:  stable mood, no significant anxiety or depression  ENDOCRINE: intermittent hot flashes      Physical Exam   VS: /76   Pulse 93   Ht 1.613 m (5' 3.5\")   Wt 51 kg (112 lb 6.4 oz)   BMI 19.60 kg/m     CONSTITUTIONAL: healthy, alert and NAD, well dressed, answering questions appropriately  ENT: no nose swelling or nasal discharge, mouth redness or gum changes.  EYES: eyes grossly normal to inspection, conjunctivae and sclerae normal, no exophthalmos or proptosis  THYROID:  no apparent nodules or goiter  CV:  RRR without murmurs  LUNGS: no audible wheeze, " cough or visible cyanosis, no visible retractions or increased work of breathing  ABDOMEN: abdomen not evaluated  EXTREMITIES: no hand tremors, limited exam  NEUROLOGY: CN grossly intact, mentation intact and speech normal   SKIN:  no apparent skin lesions, rash, or edema with visualized skin appearance  PSYCH: mentation appears normal, affect normal/bright, judgement and insight intact,   normal speech and appearance well groomed    LABS:     All pertinent notes, labs, and images personally reviewed by me.        A/P:  Encounter Diagnoses   Name Primary?     Type 1 diabetes mellitus with proliferative retinopathy, macular edema presence unspecified, unspecified laterality, unspecified proliferative retinopathy type (H) Yes     Type 1 diabetes mellitus with diabetic neuropathy (H)        Comments:  Reviewed the diabetes and health history  Recent SG trends fluctuate but good overall  Reviewed and interpreted tests that I previously ordered.   Ordered appropriate tests for the endocrinology disease management.    Management options discussed and implemented after shared medical decision making with the patient.  T1DM problem is chronic-stable    Plan:  Discussed general issues with the diabetes diagnosis and managemen  We discussed the hgbA1c test which reflects previous overall glucose levels or control  Discussed the importance of blood glucose (BG) testing to assess glucose trends  Discussed use of the Accu-check Guide BG meter  Provided general overview of the multiple daily injection (MDI) plan using rapid acting mealtime               and longacting insulin medications  Reviewed recent Freestyle Libre2 CGM glucose sensor data, in detail.     Recommend:  Continue current insulin treatment and diabetes management, with Fiasp mealtime & Lantus MDI plan  Use the InsulinCalculator [employer] merry as insulin dose calculator, discussed  If premeal sensor glucose at low end of range, reduce the Fiasp dose by 1-2  units  Continue use of the Freestyle Libre2 sensor  Goal target premeal glucose 80- 150 mg/dl  Plan repeat lab tests soon   Discussed option for labs at [facility] clinic, with her planned PCP appt   Lab orders placed  Discussed hypoglycemia management, use of glucose tabs or jelly bean snack  Continue the losartan daily dose  Continue current simvastatin lipid medication   Advise having fasting lipid panel testing and dilated eye examination at least annually     Keep regular follow-up appointments with PCP, GYN, rheumatology  Addressed patient questions today     There are no Patient Instructions on file for this visit.    Future labs ordered today:   Orders Placed This Encounter   Procedures     GLUCOSE MONITOR, 72 HOUR, PHYS INTERP     TSH with free T4 reflex     Basic metabolic panel     Albumin Random Urine Quantitative with Creat Ratio     Hemoglobin A1c     Radiology/Consults ordered today: None    Total time spent in with the patient evaluation:  15 min  Additional time spent reviewing pertinent lab tests and chart notes, and documentation:  5 min    Follow-up:  3/2022, 12 p Return    TI Lowe MD, MS  Endocrinology  [facility]    CC:  CHUY Melendez                Again, thank you for allowing me to participate in the care of your patient.        Sincerely,        Saurabh Lowe MD

## 2022-01-01 ENCOUNTER — TRANSFERRED RECORDS (OUTPATIENT)
Dept: MULTI SPECIALTY CLINIC | Facility: CLINIC | Age: 52
End: 2022-01-01
Payer: COMMERCIAL

## 2022-01-01 LAB — PAP SMEAR - HIM PATIENT REPORTED: NEGATIVE

## 2022-02-13 DIAGNOSIS — I10 ESSENTIAL HYPERTENSION: ICD-10-CM

## 2022-02-13 DIAGNOSIS — E10.3599 TYPE 1 DIABETES MELLITUS WITH PROLIFERATIVE RETINOPATHY, MACULAR EDEMA PRESENCE UNSPECIFIED, UNSPECIFIED LATERALITY, UNSPECIFIED PROLIFERATIVE RETINOPATHY TYPE (H): ICD-10-CM

## 2022-02-14 RX ORDER — LOSARTAN POTASSIUM 25 MG/1
TABLET ORAL
Qty: 90 TABLET | Refills: 0 | Status: SHIPPED | OUTPATIENT
Start: 2022-02-14 | End: 2022-05-16

## 2022-02-15 NOTE — TELEPHONE ENCOUNTER
Pending Prescriptions:                       Disp   Refills    losartan (COZAAR) 25 MG tablet [Pharmacy *90 tab*0            Sig: TAKE 1 TABLET(25 MG) BY MOUTH DAILY      Medication is being filled for 1 time refill only due to:  patricio has an upcoming appointment    Next 5 appointments (look out 90 days)    Feb 23, 2022  8:30 AM  (Arrive by 8:10 AM)  Adult Preventative Visit with Calos Melendez MD  [facility] ([facility] ) [address] [facility] [id number] [phone]

## 2022-02-19 ENCOUNTER — HEALTH MAINTENANCE LETTER (OUTPATIENT)
Age: 52
End: 2022-02-19

## 2022-02-23 ENCOUNTER — OFFICE VISIT (OUTPATIENT)
Dept: INTERNAL MEDICINE | Facility: CLINIC | Age: 52
End: 2022-02-23
Payer: COMMERCIAL

## 2022-02-23 VITALS
RESPIRATION RATE: 12 BRPM | DIASTOLIC BLOOD PRESSURE: 82 MMHG | TEMPERATURE: 96.6 F | HEIGHT: 64 IN | OXYGEN SATURATION: 96 % | BODY MASS INDEX: 19.53 KG/M2 | HEART RATE: 93 BPM | SYSTOLIC BLOOD PRESSURE: 136 MMHG | WEIGHT: 114.4 LBS

## 2022-02-23 DIAGNOSIS — I10 PRIMARY HYPERTENSION: ICD-10-CM

## 2022-02-23 DIAGNOSIS — D84.9 IMMUNOSUPPRESSED STATUS (H): ICD-10-CM

## 2022-02-23 DIAGNOSIS — M06.9 RHEUMATOID ARTHRITIS, INVOLVING UNSPECIFIED SITE, UNSPECIFIED WHETHER RHEUMATOID FACTOR PRESENT (H): ICD-10-CM

## 2022-02-23 DIAGNOSIS — Z00.00 ROUTINE HISTORY AND PHYSICAL EXAMINATION OF ADULT: Primary | ICD-10-CM

## 2022-02-23 DIAGNOSIS — E78.5 HYPERLIPIDEMIA LDL GOAL <100: ICD-10-CM

## 2022-02-23 DIAGNOSIS — E10.3599 TYPE 1 DIABETES MELLITUS WITH PROLIFERATIVE RETINOPATHY, MACULAR EDEMA PRESENCE UNSPECIFIED, UNSPECIFIED LATERALITY, UNSPECIFIED PROLIFERATIVE RETINOPATHY TYPE (H): ICD-10-CM

## 2022-02-23 LAB
ERYTHROCYTE [DISTWIDTH] IN BLOOD BY AUTOMATED COUNT: 12.9 % (ref 10–15)
HBA1C MFR BLD: 7.7 % (ref 0–5.6)
HCT VFR BLD AUTO: 39.6 % (ref 35–47)
HGB BLD-MCNC: 12.7 G/DL (ref 11.7–15.7)
MCH RBC QN AUTO: 33.1 PG (ref 26.5–33)
MCHC RBC AUTO-ENTMCNC: 32.1 G/DL (ref 31.5–36.5)
MCV RBC AUTO: 103 FL (ref 78–100)
PLATELET # BLD AUTO: 293 10E3/UL (ref 150–450)
RBC # BLD AUTO: 3.84 10E6/UL (ref 3.8–5.2)
WBC # BLD AUTO: 5.9 10E3/UL (ref 4–11)

## 2022-02-23 PROCEDURE — 99396 PREV VISIT EST AGE 40-64: CPT | Performed by: INTERNAL MEDICINE

## 2022-02-23 PROCEDURE — 84443 ASSAY THYROID STIM HORMONE: CPT | Performed by: INTERNAL MEDICINE

## 2022-02-23 PROCEDURE — 83036 HEMOGLOBIN GLYCOSYLATED A1C: CPT | Performed by: INTERNAL MEDICINE

## 2022-02-23 PROCEDURE — 36415 COLL VENOUS BLD VENIPUNCTURE: CPT | Performed by: INTERNAL MEDICINE

## 2022-02-23 PROCEDURE — 80061 LIPID PANEL: CPT | Performed by: INTERNAL MEDICINE

## 2022-02-23 PROCEDURE — 82043 UR ALBUMIN QUANTITATIVE: CPT | Performed by: INTERNAL MEDICINE

## 2022-02-23 PROCEDURE — 82248 BILIRUBIN DIRECT: CPT | Performed by: INTERNAL MEDICINE

## 2022-02-23 PROCEDURE — 80053 COMPREHEN METABOLIC PANEL: CPT | Performed by: INTERNAL MEDICINE

## 2022-02-23 PROCEDURE — 85027 COMPLETE CBC AUTOMATED: CPT | Performed by: INTERNAL MEDICINE

## 2022-02-23 RX ORDER — CYCLOSPORINE 0.5 MG/ML
EMULSION OPHTHALMIC
COMMUNITY

## 2022-02-23 RX ORDER — SIMVASTATIN 40 MG
TABLET ORAL
Qty: 90 TABLET | Refills: 3 | Status: SHIPPED | OUTPATIENT
Start: 2022-02-23 | End: 2023-02-20

## 2022-02-23 ASSESSMENT — ENCOUNTER SYMPTOMS
DIZZINESS: 0
COUGH: 0
HEMATOCHEZIA: 0
WEAKNESS: 0
CONSTIPATION: 0
ABDOMINAL PAIN: 0
PARESTHESIAS: 0
FEVER: 0
NERVOUS/ANXIOUS: 0
JOINT SWELLING: 0
BREAST MASS: 0
HEADACHES: 0
HEMATURIA: 0
DYSURIA: 0
CHILLS: 0
HEARTBURN: 0
SORE THROAT: 0
ARTHRALGIAS: 0
PALPITATIONS: 0
NAUSEA: 0
FREQUENCY: 0
MYALGIAS: 0
DIARRHEA: 0
EYE PAIN: 0
SHORTNESS OF BREATH: 0

## 2022-02-23 NOTE — Clinical Note
Please abstract the following data from this visit with this patient into the appropriate field in Epic:   Eye exam with ophthalmology on this date: Last pap- 01/ 22- [facility]- normal

## 2022-02-23 NOTE — PROGRESS NOTES
SUBJECTIVE:   CC: Perlita Chavez is an 51 year old woman who presents for preventive health visit.       Patient has been advised of split billing requirements and indicates understanding: Yes  Healthy Habits:     Getting at least 3 servings of Calcium per day:  Yes    Bi-annual eye exam:  Yes    Dental care twice a year:  Yes    Sleep apnea or symptoms of sleep apnea:  None    Diet:  Diabetic and Carbohydrate counting    Frequency of exercise:  4-5 days/week    Duration of exercise:  45-60 minutes    Taking medications regularly:  Yes    Medication side effects:  None    PHQ-2 Total Score: 0    Additional concerns today:  Yes    Last pap- 01/ 22- [facility]- normal        Hyperlipidemia Follow-Up      Are you regularly taking any medication or supplement to lower your cholesterol?   Yes- simvastatin     Are you having muscle aches or other side effects that you think could be caused by your cholesterol lowering medication?  No      Diabetes - Follows up with endocrinologist and is on Lantus       Hypertension Follow-up      Do you check your blood pressure regularly outside of the clinic? No     Are you following a low salt diet? Yes    Are your blood pressures ever more than 140 on the top number (systolic) OR more   than 90 on the bottom number (diastolic), for example 140/90? No      History of rheumatoid arthritis/treatment for prostate cancer, follows up with the rheumatologist and is on Remicade injections , methotrexate and sulfasalazine      Today's PHQ-2 Score:   PHQ-2 ( 1999 Pfizer) 2/23/2022   Q1: Little interest or pleasure in doing things 0   Q2: Feeling down, depressed or hopeless 0   PHQ-2 Score 0   PHQ-2 Total Score (12-17 Years)- Positive if 3 or more points; Administer PHQ-A if positive -   Q1: Little interest or pleasure in doing things Not at all   Q2: Feeling down, depressed or hopeless Not at all   PHQ-2 Score 0       Abuse: Current or Past (Physical, Sexual or Emotional) -  No  Do you feel safe in your environment? Yes    Have you ever done Advance Care Planning? (For example, a Health Directive, POLST, or a discussion with a medical provider or your loved ones about your wishes): Yes, advance care planning is on file.      Past Medical History:   Diagnosis Date     Diabetic retinopathy associated with type 1 diabetes mellitus (H)     proliferative DR and bilateral laser PC surgeries     Hyperlipidemia LDL goal < 130      RA (rheumatoid arthritis) (H)     seeing rheumatologist.     Sjoegren syndrome     seeing rheumatologist.     Type 1 diabetes mellitus (H)     retinopathy, neuropathy     Visual impairment      Past Surgical History:   Procedure Laterality Date     [facility] NONSPECIFIC PROCEDURE  8/00/97    T & A     [facility] NONSPECIFIC PROCEDURE  5/00/99    L eye surgery     [facility] NONSPECIFIC PROCEDURE  4/10/01    R cataract + IOL     [facility] NONSPECIFIC PROCEDURE  2002    bilat vitrectomy     [facility] NONSPECIFIC PROCEDURE  7/04    R eye vitrectomy     [facility] NONSPECIFIC PROCEDURE  5/08    R vitrectomy & partial retinal detachment        Current Outpatient Medications   Medication Sig Dispense Refill     brimonidine (ALPHAGAN) 0.2 % ophthalmic solution INT 1 GTT IN OU BID  3     Continuous Blood Gluc Sensor (FREESTYLE LOUIS 2 SENSOR SYSTM) MISC 1 Device continuous prn (Change every 14 days) For use with Freestyle Louis 2  for continuous blood glucose monitoring 2 each 11     Continuous Blood Gluc Sensor (FREESTYLE LOUIS 2 SENSOR) MISC CHANGE EVERY 14 DAYS 2 each 11     cycloSPORINE (RESTASIS) 0.05 % ophthalmic emulsion Restasis 0.05 % eye drops in a dropperette   INT 1 GTT IN OU BID       diclofenac (VOLTAREN) 75 MG EC tablet TK 1 T PO BID  2     dorzolamide-timolol (COSOPT) 2-0.5 % ophthalmic solution INSTILL 1 GTT IN OU QAM  6     Ferrous Sulfate (IRON PO)        FOLIC ACID PO Take 1 mg by mouth 2 times daily        InFLIXimab (REMICADE IV) Inject 300 mg into the vein Every 2 months        insulin aspart (FIASP FLEXTOUCH) 100 UNIT/ML pen-injector ADMINISTER 3 TO 8 UNITS UNDER THE SKIN THREE TIMES DAILY WITH MEALS 15 mL 5     insulin glargine (LANTUS SOLOSTAR) 100 UNIT/ML pen Inject 15 Units Subcutaneous every morning 15 mL 5     insulin pen needle (BD ELIUD U/F) 32G X 4 MM miscellaneous Use 4 pen needles daily or as directed. 400 each 0     losartan (COZAAR) 25 MG tablet TAKE 1 TABLET(25 MG) BY MOUTH DAILY 90 tablet 0     Methotrexate Sodium (METHOTREXATE PO) Take by mouth once a week 5 tablets once a week       Omega-3 Fatty Acids (FISH OIL TRIPLE STRENGTH) 1400 MG CAPS Take 2 tablets by mouth daily.       ONE DAILY MULTIPLE VITAMIN OR TABS one daily  0     predniSONE (DELTASONE) 5 MG tablet 4 mg   0     simvastatin (ZOCOR) 40 MG tablet TAKE 1 TABLET BY MOUTH AT BEDTIME TO LOWER CHOLESTEROL 90 tablet 0     sulfaSALAzine (AZULFIDINE) 500 MG tablet Take 500 mg by mouth 2 times daily 2 tablets twice daily       ValACYclovir (VALTREX) 500 MG tablet Take 500 mg by mouth daily  14 tablet prn     blood glucose (EDWIN CONTOUR) test strip 1 strip by In Vitro route 4 times daily (before meals and nightly) Checks 4-5 x daily (Patient not taking: Reported on 2/23/2022) 400 strip 3     blood glucose (NO BRAND SPECIFIED) test strip Use to test blood sugar 4 times daily or as directed, Accu-check Guide test strips, E10.9. (Patient not taking: Reported on 2/23/2022) 400 strip 0     blood glucose monitoring (NO BRAND SPECIFIED) meter device kit Use to test blood sugar 5 times daily or as directed, Accu-check Guide meter. (Patient not taking: Reported on 2/23/2022) 1 kit 1     blood glucose monitoring (NO BRAND SPECIFIED) test strip Use to test blood sugar 4 times daily or as directed, Accu-check Guide meter test strips (Patient not taking: Reported on 2/23/2022) 400 strip 3     continuous blood glucose monitoring (FREESTYLE LOUIS) sensor For use with Freestyle Louis Flash  for continuous monitioring of blood  glucose levels. Replace sensor every 14 days. (Patient not taking: Reported on 2/23/2022) 2 each 11       Family History   Problem Relation Age of Onset     Arthritis Mother         on celebrex,alive & healthy 2002     Breast Cancer Mother      Arthritis Father         on celebrex(2002)     Heart Disease Father         heart attack 1990, 60 years old (2002)     Diabetes Father      Cerebrovascular Disease Father         age 70     Thyroid Disease Sister         on thyroid med for couple of years , 35 yrs now(2002)     Cancer Paternal Grandmother         STOMACH     Cerebrovascular Disease Paternal Grandfather         age 90     Breast Cancer Cousin 55.00     Colorectal Cancer Cousin      Breast Cancer Other      Colorectal Cancer Other        Social History     Tobacco Use     Smoking status: Never Smoker     Smokeless tobacco: Never Used   Substance Use Topics     Alcohol use: Yes     Comment: 1 drink every 3 weeks     If you drink alcohol do you typically have >3 drinks per day or >7 drinks per week? No    Alcohol Use 2/23/2022   Prescreen: >3 drinks/day or >7 drinks/week? No   Prescreen: >3 drinks/day or >7 drinks/week? -   No flowsheet data found.    Reviewed orders with patient.  Reviewed health maintenance and updated orders accordingly - Yes  Lab work is in process    Breast Cancer Screening:     Pertinent mammograms are reviewed under the imaging tab.    History of abnormal Pap smear: NO - age 30-65 PAP every 5 years with negative HPV co-testing recommended     Reviewed and updated as needed this visit by clinical staff                  Reviewed and updated as needed this visit by Provider                     Review of Systems   Constitutional: Negative for chills and fever.   HENT: Negative for congestion, ear pain, hearing loss and sore throat.    Eyes: Negative for pain and visual disturbance.   Respiratory: Negative for cough and shortness of breath.    Cardiovascular: Negative for chest pain,  "palpitations and peripheral edema.   Gastrointestinal: Negative for abdominal pain, constipation, diarrhea, heartburn, hematochezia and nausea.   Breasts:  Negative for tenderness, breast mass and discharge.   Genitourinary: Negative for dysuria, frequency, genital sores, hematuria, pelvic pain, urgency, vaginal bleeding and vaginal discharge.   Musculoskeletal: Negative for arthralgias, joint swelling and myalgias.   Skin: Negative for rash.   Neurological: Negative for dizziness, weakness, headaches and paresthesias.   Psychiatric/Behavioral: Negative for mood changes. The patient is not nervous/anxious.      OBJECTIVE:   /82   Pulse 93   Temp (!) 96.6  F (35.9  C) (Axillary)   Resp 12   Ht 1.613 m (5' 3.5\")   Wt 51.9 kg (114 lb 6.4 oz)   SpO2 96%   BMI 19.95 kg/m    Physical Exam  GENERAL APPEARANCE: healthy, alert and no distress  NECK: no adenopathy, no asymmetry, masses, or scars and thyroid normal to palpation  RESP: lungs clear to auscultation - no rales, rhonchi or wheezes  BREAST: normal without masses, tenderness or nipple discharge and no palpable axillary masses or adenopathy  CV: regular rate and rhythm, normal S1 S2,   ABDOMEN: soft, nontender, no hepatosplenomegaly, no masses and bowel sounds normal  MS: no musculoskeletal defects are noted and gait is age appropriate without ataxia  NEURO: Normal strength and tone, sensory exam grossly normal, mentation intact and speech normal  DIABETIC FOOT EXAM: normal DP and PT pulses, no trophic changes or ulcerative lesions, normal sensory exam and normal monofilament exam  PSYCH: mentation appears normal and affect normal/bright    ASSESSMENT/PLAN:      (Z00.00) Routine history and physical examination of adult  (primary encounter diagnosis)  Plan: Lipid panel reflex to direct LDL Fasting,         Hepatic function panel, CBC with platelets,         Fecal colorectal cancer screen (FIT)             (E78.5) Hyperlipidemia LDL goal <100  Comment: " "check lipid panel  Plan: simvastatin (ZOCOR) 40 MG tablet refilled.explained clearly about the medication,insructions and side effects.            (M06.9) Rheumatoid arthritis, involving unspecified site, unspecified whether rheumatoid factor present (H)  (D84.9) Immunosuppressed status (H)  Plan: follows up with the rheumatologist and is on Remicade injections , methotrexate and sulfasalazine      (E10.3599) Type 1 diabetes mellitus with proliferative retinopathy, macular edema presence unspecified, unspecified laterality, unspecified proliferative retinopathy type (H)  Plan: follows up with endocrinologist       Patient has been advised of split billing requirements and indicates understanding: Yes    COUNSELING:  Reviewed preventive health counseling, as reflected in patient instructions       Regular exercise       Healthy diet/nutrition     Discussed covid 19 booster , pt says she just had Ramicade inj yesterday and pt will schedule later .     Estimated body mass index is 19.6 kg/m  as calculated from the following:    Height as of 12/21/21: 1.613 m (5' 3.5\").    Weight as of 12/21/21: 51 kg (112 lb 6.4 oz).        She reports that she has never smoked. She has never used smokeless tobacco.    Please abstract the following data from this visit with this patient into the appropriate field in Epic:   Eye exam with ophthalmology on this date: Last pap- 01/ 22- [facility]- normal          Counseling Resources:  ATP IV Guidelines  Pooled Cohorts Equation Calculator  Breast Cancer Risk Calculator  BRCA-Related Cancer Risk Assessment: FHS-7 Tool  FRAX Risk Assessment  ICSI Preventive Guidelines  Dietary Guidelines for Americans, 2010  USDA's MyPlate  ASA Prophylaxis  Lung CA Screening    Calos Melendez MD  [facility]  "

## 2022-02-23 NOTE — NURSING NOTE
"/82   Pulse 93   Temp (!) 96.6  F (35.9  C) (Axillary)   Resp 12   Ht 1.613 m (5' 3.5\")   Wt 51.9 kg (114 lb 6.4 oz)   SpO2 96%   BMI 19.95 kg/m    Patient in for Female Px.  "

## 2022-02-24 LAB
ALBUMIN SERPL-MCNC: 3.8 G/DL (ref 3.4–5)
ALP SERPL-CCNC: 68 U/L (ref 40–150)
ALT SERPL W P-5'-P-CCNC: 37 U/L (ref 0–50)
ANION GAP SERPL CALCULATED.3IONS-SCNC: 6 MMOL/L (ref 3–14)
AST SERPL W P-5'-P-CCNC: 31 U/L (ref 0–45)
BILIRUB DIRECT SERPL-MCNC: 0.2 MG/DL (ref 0–0.2)
BILIRUB SERPL-MCNC: 0.7 MG/DL (ref 0.2–1.3)
BUN SERPL-MCNC: 13 MG/DL (ref 7–30)
CALCIUM SERPL-MCNC: 9.7 MG/DL (ref 8.5–10.1)
CHLORIDE BLD-SCNC: 105 MMOL/L (ref 94–109)
CHOLEST SERPL-MCNC: 150 MG/DL
CO2 SERPL-SCNC: 29 MMOL/L (ref 20–32)
CREAT SERPL-MCNC: 0.89 MG/DL (ref 0.52–1.04)
CREAT UR-MCNC: 391 MG/DL
FASTING STATUS PATIENT QL REPORTED: NO
GFR SERPL CREATININE-BSD FRML MDRD: 78 ML/MIN/1.73M2
GLUCOSE BLD-MCNC: 147 MG/DL (ref 70–99)
HDLC SERPL-MCNC: 65 MG/DL
LDLC SERPL CALC-MCNC: 67 MG/DL
MICROALBUMIN UR-MCNC: 32 MG/L
MICROALBUMIN/CREAT UR: 8.18 MG/G CR (ref 0–25)
NONHDLC SERPL-MCNC: 85 MG/DL
POTASSIUM BLD-SCNC: 4.3 MMOL/L (ref 3.4–5.3)
PROT SERPL-MCNC: 8.8 G/DL (ref 6.8–8.8)
SODIUM SERPL-SCNC: 140 MMOL/L (ref 133–144)
TRIGL SERPL-MCNC: 89 MG/DL
TSH SERPL DL<=0.005 MIU/L-ACNC: 3.33 MU/L (ref 0.4–4)

## 2022-02-26 DIAGNOSIS — E78.5 HYPERLIPIDEMIA LDL GOAL <100: ICD-10-CM

## 2022-02-28 RX ORDER — SIMVASTATIN 40 MG
TABLET ORAL
Qty: 90 TABLET | Refills: 3 | OUTPATIENT
Start: 2022-02-28

## 2022-03-08 ENCOUNTER — OFFICE VISIT (OUTPATIENT)
Dept: ENDOCRINOLOGY | Facility: CLINIC | Age: 52
End: 2022-03-08
Payer: COMMERCIAL

## 2022-03-08 VITALS
SYSTOLIC BLOOD PRESSURE: 133 MMHG | HEART RATE: 83 BPM | DIASTOLIC BLOOD PRESSURE: 76 MMHG | WEIGHT: 115.6 LBS | BODY MASS INDEX: 20.16 KG/M2

## 2022-03-08 DIAGNOSIS — E10.40 TYPE 1 DIABETES MELLITUS WITH DIABETIC NEUROPATHY (H): ICD-10-CM

## 2022-03-08 DIAGNOSIS — E10.3599 TYPE 1 DIABETES MELLITUS WITH PROLIFERATIVE RETINOPATHY, MACULAR EDEMA PRESENCE UNSPECIFIED, UNSPECIFIED LATERALITY, UNSPECIFIED PROLIFERATIVE RETINOPATHY TYPE (H): Primary | ICD-10-CM

## 2022-03-08 PROCEDURE — 95251 CONT GLUC MNTR ANALYSIS I&R: CPT | Performed by: INTERNAL MEDICINE

## 2022-03-08 PROCEDURE — 99213 OFFICE O/P EST LOW 20 MIN: CPT | Performed by: INTERNAL MEDICINE

## 2022-03-08 NOTE — LETTER
3/8/2022         RE: Perlita Chavez  [address]  [facility] [address]        Dear Colleague,    Thank you for referring your patient, Perlita Chavez, to the [facility] [place]. Please see a copy of my visit note below.    Recent issues:  Diabetes follow-up evaluation  Taking the Fiasp and Lantus insulin plan, also using the Freestyle Libre2 CGM sensor  Planning to get her COVID-19 booster soon  Noticing left middle finger contracture movements  Continues taking the Remicaid and (same) low dose Prednisone 4 mg daily          1972. Diagnosis of diabetes mellitus age 2 1/2, living in [facility]  Initial treatment with NPH and Regular insulin medications  Had seen Dr. Sarah Sirera/ Endocrinology  Switched to MDI insulin plan using Nv and Levemir insulin     8/19/14. Initial consult with me at my former clinic ([facility])  8/20/14. CDE RD evaluation, also tried demo OmniPod pump  Used Novolog Echo 1/2U pen, with I:C carb counting method  Tried Lantus BID dosing, then Tresiba (expensive), then Basaglar              Previously used Novolog insulin  5/2018. Switched to Fiasp 3/2018, then Fiasp non-formulary 2019  Meals typically brunch 10am and supper 9:30pm  Takes Prednisone for RA, current dose 4 mg QAM  Discussed hypoglycemia treatment options, jelly beans vs glucose tablets    Current DM meds:  Lantus Solostar                      14U each morning  Fiasp Flextouch                      1:13 ratio (typically 8Uper meal)                                                  sscale 1U per 40>125                                                  target  mg/dl     Has InsulinCalculator smart phone merry, but not used  Using Accucheck Guide BG meter              Tests 4x/day              Does own I:C and ISF calculations  9/2019. She has worn a free sample LibrePersonal continuous glucose sensor              Started Freestyle Mary CGM, then upgraded to Libre2 CGM with Diana    Recent  Freestyle Libre2 data:          Previous [facility] labs include:  Lab Results   Component Value Date    A1C 7.7 (H) 02/23/2022     02/23/2022    POTASSIUM 4.3 02/23/2022    CHLORIDE 105 02/23/2022    CO2 29 02/23/2022    ANIONGAP 6 02/23/2022     (H) 02/23/2022    BUN 13 02/23/2022    CR 0.89 02/23/2022    GFRESTIMATED 78 02/23/2022    GFRESTBLACK 87 02/19/2021    FINESSE 9.7 02/23/2022    CHOL 150 02/23/2022    TRIG 89 02/23/2022    HDL 65 02/23/2022    LDL 67 02/23/2022    NHDL 85 02/23/2022    UCRR 391 02/23/2022    MICROL 32 02/23/2022    UMALCR 8.18 02/23/2022     Lab Results   Component Value Date    TSH 3.33 02/23/2022    T4 0.96 02/18/2011     No history of vascular disease.  Takes simvastatin for hyperlipidemia management.  DM Complications:              Retinopathy                          Previous proliferative DR and bilateral laser PC surgeries                          Had cataracts, retinal detachment repair OD, higher occular pressures                          Significant vision loss, needs print magnification                          Sees Fer Davison and EBER Trevizo/ophthalmology, last eye exam with Dr. 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 Humberto Melendez/ [facility] for general medicine evaluations.  Also sees Dr. Blanco/[facility] rheumatology      PMH/PSH:  Past Medical History:   Diagnosis Date     Diabetic retinopathy associated with type 1 diabetes mellitus (H)     proliferative DR and bilateral laser PC surgeries     Hyperlipidemia LDL goal < 130      RA (rheumatoid arthritis) (H)     seeing rheumatologist.     Sjoegren syndrome     seeing rheumatologist.     Type 1 diabetes mellitus (H)     retinopathy, neuropathy     Visual impairment      Past Surgical  History:   Procedure Laterality Date     Z NONSPECIFIC PROCEDURE  8/00/97    T & A     ZZC NONSPECIFIC PROCEDURE  5/00/99    L eye surgery     ZZC NONSPECIFIC PROCEDURE  4/10/01    R cataract + IOL     ZC NONSPECIFIC PROCEDURE  2002    bilat vitrectomy     ZC NONSPECIFIC PROCEDURE  7/04    R eye vitrectomy     ZZC NONSPECIFIC PROCEDURE  5/08    R vitrectomy & partial retinal detachment        Family Hx:  Family History   Problem Relation Age of Onset     Arthritis Mother         on celebrex,alive & healthy 2002     Breast Cancer Mother      Arthritis Father         on celebrex(2002)     Heart Disease Father         heart attack 1990, 60 years old (2002)     Diabetes Father      Cerebrovascular Disease Father         age 70     Thyroid Disease Sister         on thyroid med for couple of years , 35 yrs now(2002)     Cancer Paternal Grandmother         STOMACH     Cerebrovascular Disease Paternal Grandfather         age 90     Breast Cancer Cousin 55.00     Colorectal Cancer Cousin      Breast Cancer Other      Colorectal Cancer Other          Social Hx:  Social History     Socioeconomic History     Marital status: Single     Spouse name:      Number of children: 0     Years of education: Not on file     Highest education level: Not on file   Occupational History     Occupation: [employer]     Employer: [employer]   Tobacco Use     Smoking status: Never Smoker     Smokeless tobacco: Never Used   Substance and Sexual Activity     Alcohol use: Yes     Comment: 1 drink every 3 weeks     Drug use: No     Sexual activity: Not Currently     Partners: Male     Comment:    Other Topics Concern     Parent/sibling w/ CABG, MI or angioplasty before 65F 55M? Not Asked   Social History Narrative     Not on file     Social Determinants of Health     Financial Resource Strain: Not on file   Food Insecurity: Not on file   Transportation Needs: Not on file   Physical Activity: Not on file   Stress: Not  on file   Social Connections: Not on file   Intimate Partner Violence: Not on file   Housing Stability: Not on file          MEDICATIONS:  has a current medication list which includes the following prescription(s): brimonidine, freestyle thea 2 sensor systm, freestyle thea 2 sensor, restasis, diclofenac, dorzolamide-timolol, ferrous sulfate, folic acid, infliximab, insulin aspart, insulin glargine, losartan, methotrexate, fish oil triple strength, one daily multiple vitamin, prednisone, simvastatin, sulfasalazine, valacyclovir, blood glucose, blood glucose, blood glucose monitoring, blood glucose, continuous blood glucose monitoring, and bd adelfo u/f.        ROS: 10 point ROS neg other than the symptoms noted above in the HPI.     GENERAL: some fatigue; weight stable; denies fevers, chills, night sweats.   HEENT: minimal/no vision from right eye, dry eyes/mouth; no dysphagia, odonophagia, diplopia  THYROID:  no apparent hyper or hypothyroid symptoms  CV: no chest pain, pressure, palpitations  LUNGS: no SOB, FUENTES, cough, wheezing   ABDOMEN: rare morning nausea; no diarrhea, constipation, abdominal pain  EXTREMITIES: no rashes, ulcers, edema  NEUROLOGY: decreased sensation at feet; no headaches, denies changes in vision, tingling, extremitiy numbness   MSK: mild hand tendinitis pains; no muscle aches, weakness  SKIN: scalp hair thinning; denies rashes or lesions/foot sores  :  no menses since stopping OCP medication 1/2021  PSYCH:  stable mood, no significant anxiety or depression  ENDOCRINE: intermittent hot flashes    Physical Exam   VS: /76   Pulse 83   Wt 52.4 kg (115 lb 9.6 oz)   BMI 20.16 kg/m     CONSTITUTIONAL: healthy, alert and NAD, well dressed, answering questions appropriately  ENT: no nose swelling or nasal discharge, mouth redness or gum changes.  EYES: eyes grossly normal to inspection, conjunctivae and sclerae normal, no exophthalmos or proptosis  THYROID:  no apparent nodules or  goiter  LUNGS: no audible wheeze, cough or visible cyanosis, no visible retractions or increased work of breathing  ABDOMEN: abdomen normal size  EXTREMITIES: slowed flexion with left middle finger; no hand tremors, limited exam  NEUROLOGY: CN grossly intact, mentation intact and speech normal   SKIN:  no apparent skin lesions, rash, or edema with visualized skin appearance  PSYCH: mentation appears normal, affect normal/bright, judgement and insight intact,   normal speech and appearance well groomed    LABS:     All pertinent notes, labs, and images personally reviewed by me.        A/P:  Encounter Diagnoses   Name Primary?     Type 1 diabetes mellitus with proliferative retinopathy, macular edema presence unspecified, unspecified laterality, unspecified proliferative retinopathy type (H) Yes     Type 1 diabetes mellitus with diabetic neuropathy (H)        Comments:  Reviewed the diabetes and health history  Recent SG trends fluctuate but good overall  Reviewed and interpreted tests that I previously ordered.   Ordered appropriate tests for the endocrinology disease management.    Management options discussed and implemented after shared medical decision making with the patient.  T1DM problem is chronic-stable    Plan:  Discussed general issues with the diabetes diagnosis and managemen  We discussed the hgbA1c test which reflects previous overall glucose levels or control  Discussed the importance of blood glucose (BG) testing to assess glucose trends  Discussed use of the Accu-check Guide BG meter  Provided general overview of the multiple daily injection (MDI) plan using rapid acting mealtime               and longacting insulin medications  Reviewed recent Freestyle Libre2 CGM glucose sensor data, in detail.     Recommend:  Continue current insulin treatment and diabetes management, with Fiasp mealtime & Lantus MDI plan   Lower Lantus dose 14U to 13U daily   Change mealtime Fiasp 1:13 to 1:12 ratio    Use the  InsulinCalculator [employer] merry as insulin dose calculator, discussed  If premeal sensor glucose at low end of range, reduce the Fiasp dose by 1-2 units  Continue use of the Freestyle Libre2 sensor  Goal target premeal glucose 80- 150 mg/dl  We have discussed hypoglycemia management, use of glucose tabs or jelly bean snack  No labs ordered at this time  Continue the losartan daily dose  Continue current simvastatin lipid medication   Advise having fasting lipid panel testing and dilated eye examination at least annually     Keep regular follow-up appointments with PCP, GYN, rheumatology  Addressed patient questions today     There are no Patient Instructions on file for this visit.    Future labs ordered today:   Orders Placed This Encounter   Procedures     GLUCOSE MONITOR, 72 HOUR, PHYS INTERP     Radiology/Consults ordered today: None    Total time spent in with the patient evaluation:  16 min  Additional time spent reviewing pertinent lab tests and chart notes, and documentation:  5 min    Follow-up:  6/2022, Return    TI Lowe MD, MS  Endocrinology  [facility]                    Again, thank you for allowing me to participate in the care of your patient.        Sincerely,        Saurabh Lowe MD

## 2022-03-08 NOTE — PROGRESS NOTES
Recent issues:  Diabetes follow-up evaluation  Taking the Fiasp and Lantus insulin plan, also using the Freestyle Libre2 CGM sensor  Planning to get her COVID-19 booster soon  Noticing left middle finger contracture movements  Continues taking the Remicaid and (same) low dose Prednisone 4 mg daily          1972. Diagnosis of diabetes mellitus age 2 1/2, living in [facility]  Initial treatment with NPH and Regular insulin medications  Had seen Dr. Sarah Sierra/[facility]  Switched to MDI insulin plan using Nv and Levemir insulin     8/19/14. Initial consult with me at my former clinic ([facility])  8/20/14. CDE RD evaluation, also tried demo OmniPod pump  Used Novolog Echo 1/2U pen, with I:C carb counting method  Tried Lantus BID dosing, then Tresiba (expensive), then Basaglar              Previously used Novolog insulin  5/2018. Switched to Fiasp 3/2018, then Fiasp non-formulary 2019  Meals typically brunch 10am and supper 9:30pm  Takes Prednisone for RA, current dose 4 mg QAM  Discussed hypoglycemia treatment options, jelly beans vs glucose tablets    Current DM meds:  Lantus Solostar                      14U each morning  Fiasp Flextouch                      1:13 ratio (typically 8Uper meal)                                                  sscale 1U per 40>125                                                  target  mg/dl     Has InsulinCalculator smart phone merry, but not used  Using Accucheck Guide BG meter              Tests 4x/day              Does own I:C and ISF calculations  9/2019. She has worn a free sample LibrePersonal continuous glucose sensor              Started Freestyle Mary CGM, then upgraded to Libre2 CGM with [place]    Recent Freestyle Libre2 data:          Previous [facility] labs include:  Lab Results   Component Value Date    A1C 7.7 (H) 02/23/2022     02/23/2022    POTASSIUM 4.3 02/23/2022    CHLORIDE 105 02/23/2022    CO2 29 02/23/2022    ANIONGAP 6 02/23/2022     (H)  02/23/2022    BUN 13 02/23/2022    CR 0.89 02/23/2022    GFRESTIMATED 78 02/23/2022    GFRESTBLACK 87 02/19/2021    FINESSE 9.7 02/23/2022    CHOL 150 02/23/2022    TRIG 89 02/23/2022    HDL 65 02/23/2022    LDL 67 02/23/2022    NHDL 85 02/23/2022    UCRR 391 02/23/2022    MICROL 32 02/23/2022    UMALCR 8.18 02/23/2022     Lab Results   Component Value Date    TSH 3.33 02/23/2022    T4 0.96 02/18/2011     No history of vascular disease.  Takes simvastatin for hyperlipidemia management.  DM Complications:              Retinopathy                          Previous proliferative DR and bilateral laser PC surgeries                          Had cataracts, retinal detachment repair OD, higher occular pressures                          Significant vision loss, needs print magnification                          Sees Fer Davison and EBER Trevizo/ophthalmology, last eye exam with Dr. Davison 1/2019              Neuropathy:                          Decreased sensation in feet        Single, lives in [place], [place]; works at [employer] at [employer]  Exercises at [employer], [employer] exercise classes  Sees Dr. Humberto Melendez/[facility] for general medicine evaluations.  Also sees Dr. Blanco/[facility] rheumatology      PMH/PSH:  Past Medical History:   Diagnosis Date     Diabetic retinopathy associated with type 1 diabetes mellitus (H)     proliferative DR and bilateral laser PC surgeries     Hyperlipidemia LDL goal < 130      RA (rheumatoid arthritis) (H)     seeing rheumatologist.     Sjoegren syndrome     seeing rheumatologist.     Type 1 diabetes mellitus (H)     retinopathy, neuropathy     Visual impairment      Past Surgical History:   Procedure Laterality Date     ZZC NONSPECIFIC PROCEDURE  8/00/97    T & A     ZZC NONSPECIFIC PROCEDURE  5/00/99    L eye surgery     ZZC NONSPECIFIC PROCEDURE  4/10/01    R cataract + IOL     ZZC NONSPECIFIC PROCEDURE  2002    bilat vitrectomy      [facility] NONSPECIFIC PROCEDURE  7/04    R eye vitrectomy     ZZ NONSPECIFIC PROCEDURE  5/08    R vitrectomy & partial retinal detachment        Family Hx:  Family History   Problem Relation Age of Onset     Arthritis Mother         on celebrex,alive & healthy 2002     Breast Cancer Mother      Arthritis Father         on celebrex(2002)     Heart Disease Father         heart attack 1990, 60 years old (2002)     Diabetes Father      Cerebrovascular Disease Father         age 70     Thyroid Disease Sister         on thyroid med for couple of years , 35 yrs now(2002)     Cancer Paternal Grandmother         STOMACH     Cerebrovascular Disease Paternal Grandfather         age 90     Breast Cancer Cousin 55.00     Colorectal Cancer Cousin      Breast Cancer Other      Colorectal Cancer Other          Social Hx:  Social History     Socioeconomic History     Marital status: Single     Spouse name:      Number of children: 0     Years of education: Not on file     Highest education level: Not on file   Occupational History     Occupation: [employer]     Employer: [employer]   Tobacco Use     Smoking status: Never Smoker     Smokeless tobacco: Never Used   Substance and Sexual Activity     Alcohol use: Yes     Comment: 1 drink every 3 weeks     Drug use: No     Sexual activity: Not Currently     Partners: Male     Comment:    Other Topics Concern     Parent/sibling w/ CABG, MI or angioplasty before 65F 55M? Not Asked   Social History Narrative     Not on file     Social Determinants of Health     Financial Resource Strain: Not on file   Food Insecurity: Not on file   Transportation Needs: Not on file   Physical Activity: Not on file   Stress: Not on file   Social Connections: Not on file   Intimate Partner Violence: Not on file   Housing Stability: Not on file          MEDICATIONS:  has a current medication list which includes the following prescription(s): brimonidine, freestyle thea 2 sensor systm,  freestyle thea 2 sensor, restasis, diclofenac, dorzolamide-timolol, ferrous sulfate, folic acid, infliximab, insulin aspart, insulin glargine, losartan, methotrexate, fish oil triple strength, one daily multiple vitamin, prednisone, simvastatin, sulfasalazine, valacyclovir, blood glucose, blood glucose, blood glucose monitoring, blood glucose, continuous blood glucose monitoring, and bd adelfo u/f.        ROS: 10 point ROS neg other than the symptoms noted above in the HPI.     GENERAL: some fatigue; weight stable; denies fevers, chills, night sweats.   HEENT: minimal/no vision from right eye, dry eyes/mouth; no dysphagia, odonophagia, diplopia  THYROID:  no apparent hyper or hypothyroid symptoms  CV: no chest pain, pressure, palpitations  LUNGS: no SOB, FUENTES, cough, wheezing   ABDOMEN: rare morning nausea; no diarrhea, constipation, abdominal pain  EXTREMITIES: no rashes, ulcers, edema  NEUROLOGY: decreased sensation at feet; no headaches, denies changes in vision, tingling, extremitiy numbness   MSK: mild hand tendinitis pains; no muscle aches, weakness  SKIN: scalp hair thinning; denies rashes or lesions/foot sores  :  no menses since stopping OCP medication 1/2021  PSYCH:  stable mood, no significant anxiety or depression  ENDOCRINE: intermittent hot flashes    Physical Exam   VS: /76   Pulse 83   Wt 52.4 kg (115 lb 9.6 oz)   BMI 20.16 kg/m     CONSTITUTIONAL: healthy, alert and NAD, well dressed, answering questions appropriately  ENT: no nose swelling or nasal discharge, mouth redness or gum changes.  EYES: eyes grossly normal to inspection, conjunctivae and sclerae normal, no exophthalmos or proptosis  THYROID:  no apparent nodules or goiter  LUNGS: no audible wheeze, cough or visible cyanosis, no visible retractions or increased work of breathing  ABDOMEN: abdomen normal size  EXTREMITIES: slowed flexion with left middle finger; no hand tremors, limited exam  NEUROLOGY: CN grossly intact, mentation  intact and speech normal   SKIN:  no apparent skin lesions, rash, or edema with visualized skin appearance  PSYCH: mentation appears normal, affect normal/bright, judgement and insight intact,   normal speech and appearance well groomed    LABS:     All pertinent notes, labs, and images personally reviewed by me.        A/P:  Encounter Diagnoses   Name Primary?     Type 1 diabetes mellitus with proliferative retinopathy, macular edema presence unspecified, unspecified laterality, unspecified proliferative retinopathy type (H) Yes     Type 1 diabetes mellitus with diabetic neuropathy (H)        Comments:  Reviewed the diabetes and health history  Recent SG trends fluctuate but good overall  Reviewed and interpreted tests that I previously ordered.   Ordered appropriate tests for the endocrinology disease management.    Management options discussed and implemented after shared medical decision making with the patient.  T1DM problem is chronic-stable    Plan:  Discussed general issues with the diabetes diagnosis and managemen  We discussed the hgbA1c test which reflects previous overall glucose levels or control  Discussed the importance of blood glucose (BG) testing to assess glucose trends  Discussed use of the Accu-check Guide BG meter  Provided general overview of the multiple daily injection (MDI) plan using rapid acting mealtime               and longacting insulin medications  Reviewed recent Freestyle Libre2 CGM glucose sensor data, in detail.     Recommend:  Continue current insulin treatment and diabetes management, with Fiasp mealtime & Lantus MDI plan   Lower Lantus dose 14U to 13U daily   Change mealtime Fiasp 1:13 to 1:12 ratio    Use the InsulinCalculator [employer] merry as insulin dose calculator, discussed  If premeal sensor glucose at low end of range, reduce the Fiasp dose by 1-2 units  Continue use of the Freestyle Libre2 sensor  Goal target premeal glucose 80- 150 mg/dl  We have discussed hypoglycemia  management, use of glucose tabs or jelly bean snack  No labs ordered at this time  Continue the losartan daily dose  Continue current simvastatin lipid medication   Advise having fasting lipid panel testing and dilated eye examination at least annually     Keep regular follow-up appointments with PCP, GYN, rheumatology  Addressed patient questions today     There are no Patient Instructions on file for this visit.    Future labs ordered today:   Orders Placed This Encounter   Procedures     GLUCOSE MONITOR, 72 HOUR, PHYS INTERP     Radiology/Consults ordered today: None    Total time spent in with the patient evaluation:  16 min  Additional time spent reviewing pertinent lab tests and chart notes, and documentation:  5 min    Follow-up:  6/2022, Return    TI Lowe MD, MS  Endocrinology  [facility]

## 2022-03-10 ENCOUNTER — IMMUNIZATION (OUTPATIENT)
Dept: NURSING | Facility: CLINIC | Age: 52
End: 2022-03-10
Payer: COMMERCIAL

## 2022-03-10 PROCEDURE — 0053A COVID-19,PF,PFIZER (12+ YRS): CPT

## 2022-03-10 PROCEDURE — 91305 COVID-19,PF,PFIZER (12+ YRS): CPT

## 2022-03-15 ENCOUNTER — TRANSFERRED RECORDS (OUTPATIENT)
Dept: HEALTH INFORMATION MANAGEMENT | Facility: CLINIC | Age: 52
End: 2022-03-15
Payer: COMMERCIAL

## 2022-03-15 LAB
ALT SERPL-CCNC: 29 IU/L (ref 5–35)
AST SERPL-CCNC: 35 U/L (ref 5–34)
CREATININE (EXTERNAL): 0.88 MG/DL (ref 0.5–1.3)
GFR ESTIMATED (EXTERNAL): 72 ML/MIN/1.73M2

## 2022-03-28 ENCOUNTER — MEDICAL CORRESPONDENCE (OUTPATIENT)
Dept: HEALTH INFORMATION MANAGEMENT | Facility: CLINIC | Age: 52
End: 2022-03-28
Payer: COMMERCIAL

## 2022-04-16 ENCOUNTER — HEALTH MAINTENANCE LETTER (OUTPATIENT)
Age: 52
End: 2022-04-16

## 2022-07-12 ENCOUNTER — OFFICE VISIT (OUTPATIENT)
Dept: ENDOCRINOLOGY | Facility: CLINIC | Age: 52
End: 2022-07-12
Payer: COMMERCIAL

## 2022-07-12 VITALS
DIASTOLIC BLOOD PRESSURE: 73 MMHG | BODY MASS INDEX: 20.01 KG/M2 | HEIGHT: 64 IN | SYSTOLIC BLOOD PRESSURE: 151 MMHG | WEIGHT: 117.2 LBS | HEART RATE: 82 BPM

## 2022-07-12 DIAGNOSIS — E10.40 TYPE 1 DIABETES MELLITUS WITH DIABETIC NEUROPATHY (H): Primary | ICD-10-CM

## 2022-07-12 DIAGNOSIS — E10.3599 TYPE 1 DIABETES MELLITUS WITH PROLIFERATIVE RETINOPATHY, MACULAR EDEMA PRESENCE UNSPECIFIED, UNSPECIFIED LATERALITY, UNSPECIFIED PROLIFERATIVE RETINOPATHY TYPE (H): ICD-10-CM

## 2022-07-12 PROCEDURE — 99214 OFFICE O/P EST MOD 30 MIN: CPT | Performed by: INTERNAL MEDICINE

## 2022-07-12 RX ORDER — INSULIN GLARGINE 100 [IU]/ML
13-15 INJECTION, SOLUTION SUBCUTANEOUS EVERY MORNING
Qty: 15 ML | Refills: 5 | Status: SHIPPED | OUTPATIENT
Start: 2022-07-12 | End: 2023-04-27

## 2022-07-12 RX ORDER — PEN NEEDLE, DIABETIC 32GX 5/32"
NEEDLE, DISPOSABLE MISCELLANEOUS
Qty: 400 EACH | Refills: 3 | Status: SHIPPED | OUTPATIENT
Start: 2022-07-12 | End: 2023-04-27

## 2022-07-12 NOTE — PROGRESS NOTES
Recent issues:  Diabetes follow-up evaluation, but 20 min late for today's appt time  Taking the Fiasp and Lantus insulin plan, also using the Freestyle Libre2 CGM sensor  Planning to get her COVID-19 booster soon  Noticing left middle finger contracture movements  Continues taking the Remicaid and (same) low dose Prednisone 4 mg daily          1972. Diagnosis of diabetes mellitus age 2 1/2, living in [facility]  Initial treatment with NPH and Regular insulin medications  Had seen Dr. Sarah Sierra/[facility]  Switched to MDI insulin plan using Nv and Levemir insulin     8/19/14. Initial consult with me at my former clinic ([facility])  8/20/14. CDE RD evaluation, also tried demo OmniPod pump  Used Novolog Echo 1/2U pen, with I:C carb counting method  Tried Lantus BID dosing, then Tresiba (expensive), then Basaglar              Previously used Novolog insulin  5/2018. Switched to Fiasp 3/2018, then Fiasp non-formulary 2019  Meals typically brunch 10am and supper 9:30pm  Takes Prednisone for RA, current dose 4 mg QAM  Discussed hypoglycemia treatment options, jelly beans vs glucose tablets    Current DM meds:  Lantus Solostar                      13U each morning  Fiasp Flextouch                      1:13 ratio (typically 8Uper meal)                                                  sscale 1U per 40>125                                                  target  mg/dl     Has InsulinCalculator smart phone merry, but not used  Using Accucheck Guide BG meter              Tests 4x/day              Does own I:C and ISF calculations  9/2019. She has worn a free sample LibrePersonal continuous glucose sensor              Started Freestyle Mary CGM, then upgraded to Libre2 CGM with [employer]    Recent Freestyle Libre2 data:   14d avg 206 mg/dl   MN-6 201, 6-12p 180, 12-6p 203, 6-12a 242    Previous [facility] labs include:  Lab Results   Component Value Date    A1C 7.7 (H) 02/23/2022     02/23/2022    POTASSIUM 4.3  02/23/2022    CHLORIDE 105 02/23/2022    CO2 29 02/23/2022    ANIONGAP 6 02/23/2022     (H) 02/23/2022    BUN 13 02/23/2022    CR 0.89 02/23/2022    GFRESTIMATED 78 02/23/2022    GFRESTBLACK 87 02/19/2021    FINESSE 9.7 02/23/2022    CHOL 150 02/23/2022    TRIG 89 02/23/2022    HDL 65 02/23/2022    LDL 67 02/23/2022    NHDL 85 02/23/2022    UCRR 391 02/23/2022    MICROL 32 02/23/2022    UMALCR 8.18 02/23/2022     Lab Results   Component Value Date    TSH 3.33 02/23/2022    T4 0.96 02/18/2011     No history of vascular disease.  Takes simvastatin for hyperlipidemia management.  DM Complications:              Retinopathy                          Previous proliferative DR and bilateral laser PC surgeries                          Had cataracts, retinal detachment repair OD, higher occular pressures                          Significant vision loss, needs print magnification                          Sees Fer Davison and EBER Trevizo/ophthalmology, last eye exam with Dr. 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 Humberto Melendez/[facility] for general medicine evaluations.  Also sees Dr. lBanco/[facility]      PMH/PSH:  Past Medical History:   Diagnosis Date     Diabetic retinopathy associated with type 1 diabetes mellitus (H)     proliferative DR and bilateral laser PC surgeries     Hyperlipidemia LDL goal < 130      RA (rheumatoid arthritis) (H)     seeing rheumatologist.     Sjoegren syndrome     seeing rheumatologist.     Type 1 diabetes mellitus (H)     retinopathy, neuropathy     Visual impairment      Past Surgical History:   Procedure Laterality Date     [facility] NONSPECIFIC PROCEDURE  8/00/97    T & A     [facility] NONSPECIFIC PROCEDURE  5/00/99    L eye surgery     [facility]  NONSPECIFIC PROCEDURE  4/10/01    R cataract + IOL     [facility] NONSPECIFIC PROCEDURE  2002    bilat vitrectomy     Z NONSPECIFIC PROCEDURE  7/04    R eye vitrectomy     Z NONSPECIFIC PROCEDURE  5/08    R vitrectomy & partial retinal detachment        Family Hx:  Family History   Problem Relation Age of Onset     Arthritis Mother         on celebrex,alive & healthy 2002     Breast Cancer Mother      Arthritis Father         on celebrex(2002)     Heart Disease Father         heart attack 1990, 60 years old (2002)     Diabetes Father      Cerebrovascular Disease Father         age 70     Thyroid Disease Sister         on thyroid med for couple of years , 35 yrs now(2002)     Cancer Paternal Grandmother         STOMACH     Cerebrovascular Disease Paternal Grandfather         age 90     Breast Cancer Cousin 55.00     Colorectal Cancer Cousin      Breast Cancer Other      Colorectal Cancer Other          Social Hx:  Social History     Socioeconomic History     Marital status: Single     Spouse name:      Number of children: 0     Years of education: Not on file     Highest education level: Not on file   Occupational History     Occupation: [employer]     Employer: [employer]   Tobacco Use     Smoking status: Never Smoker     Smokeless tobacco: Never Used   Substance and Sexual Activity     Alcohol use: Yes     Comment: 1 drink every 3 weeks     Drug use: No     Sexual activity: Not Currently     Partners: Male     Comment:    Other Topics Concern     Parent/sibling w/ CABG, MI or angioplasty before 65F 55M? Not Asked   Social History Narrative     Not on file     Social Determinants of Health     Financial Resource Strain: Not on file   Food Insecurity: Not on file   Transportation Needs: Not on file   Physical Activity: Not on file   Stress: Not on file   Social Connections: Not on file   Intimate Partner Violence: Not on file   Housing Stability: Not on file          MEDICATIONS:  has a  "current medication list which includes the following prescription(s): freestyle thea 2 sensor, lantus solostar, bd adelfo u/f, blood glucose, blood glucose, blood glucose monitoring, blood glucose, brimonidine, continuous blood glucose monitoring, restasis, diclofenac, dorzolamide-timolol, ferrous sulfate, folic acid, infliximab, insulin aspart, losartan, methotrexate, fish oil triple strength, one daily multiple vitamin, prednisone, simvastatin, sulfasalazine, and valacyclovir.        ROS: 10 point ROS neg other than the symptoms noted above in the HPI.     GENERAL: some fatigue; weight stable; denies fevers, chills, night sweats.   HEENT: minimal/no vision from right eye, dry eyes/mouth; no dysphagia, odonophagia, diplopia  THYROID:  no apparent hyper or hypothyroid symptoms  CV: no chest pain, pressure, palpitations  LUNGS: no SOB, FUENTES, cough, wheezing   ABDOMEN: rare morning nausea; no diarrhea, constipation, abdominal pain  EXTREMITIES: no rashes, ulcers, edema  NEUROLOGY: decreased sensation at feet; no headaches, denies changes in vision, tingling, extremitiy numbness   MSK: mild hand tendinitis pains; no muscle aches, weakness  SKIN: scalp hair thinning; denies rashes or lesions/foot sores  :  no menses since stopping OCP medication 1/2021  PSYCH:  stable mood, no significant anxiety or depression  ENDOCRINE: intermittent hot flashes    Physical Exam   VS: BP (!) 151/73   Pulse 82   Ht 1.613 m (5' 3.5\")   Wt 53.2 kg (117 lb 3.2 oz)   BMI 20.44 kg/m     CONSTITUTIONAL: healthy, alert and NAD, well dressed, answering questions appropriately  ENT: no nose swelling or nasal discharge, mouth redness or gum changes.  EYES: eyes grossly normal to inspection, conjunctivae and sclerae normal, no exophthalmos or proptosis  THYROID:  no apparent nodules or goiter  LUNGS: no audible wheeze, cough or visible cyanosis, no visible retractions or increased work of breathing  ABDOMEN: abdomen normal size  EXTREMITIES: " slowed flexion with left middle finger; no hand tremors, limited exam  NEUROLOGY: CN grossly intact, mentation intact and speech normal   SKIN:  no apparent skin lesions, rash, or edema with visualized skin appearance  PSYCH: mentation appears normal, affect normal/bright, judgement and insight intact,   normal speech and appearance well groomed    LABS:     All pertinent notes, labs, and images personally reviewed by me.        A/P:  Encounter Diagnoses   Name Primary?     Type 1 diabetes mellitus with stable proliferative retinopathy, unspecified laterality (H)      Type 1 diabetes mellitus with proliferative retinopathy, macular edema presence unspecified, unspecified laterality, unspecified proliferative retinopathy type (H)        Comments:  Reviewed the diabetes and health history  Recent SG trends indicate higher afternoon and evening trends  Reviewed and interpreted tests that I previously ordered.   Ordered appropriate tests for the endocrinology disease management.    Management options discussed and implemented after shared medical decision making with the patient.  T1DM problem is chronic-stable    Plan:  Discussed general issues with the diabetes diagnosis and managemen  We discussed the hgbA1c test which reflects previous overall glucose levels or control  Discussed the importance of blood glucose (BG) testing to assess glucose trends  Discussed use of the Accu-check Guide BG meter  Provided general overview of the multiple daily injection (MDI) plan using rapid acting mealtime               and longacting insulin medications  Reviewed benefits of using the Freestyle Libre2 CGM glucose sensor      Recommend:  Continue current insulin treatment and diabetes management, with Fiasp mealtime & Lantus MDI plan  Increase the work midday snack (Sargento snack pack) + latte Fiasp insulin dose 3U to 4U.  Use the InsulinCalculator [employer] merry as insulin dose calculator, discussed  If premeal sensor glucose at low  end of range, reduce the Fiasp dose by 1-2 units  Continue use of the Freestyle Libre2 sensor  Goal target premeal glucose 80- 150 mg/dl  We have discussed hypoglycemia management, use of glucose tabs or jelly bean snack  Plan preappt labs in 9/2022   Lab orders placed  Continue the losartan daily dose  Continue current simvastatin lipid medication   Advise having fasting lipid panel testing and dilated eye examination at least annually     Patient to follow up with their Primary Care Provider regarding the elevated blood pressure.  Keep regular follow-up appointments with PCP, GYN, rheumatology  Addressed patient questions today     There are no Patient Instructions on file for this visit.    Future labs ordered today:   Orders Placed This Encounter   Procedures     Hemoglobin A1c     Vitamin D Deficiency     Basic metabolic panel     Radiology/Consults ordered today: None    Total time spent in with the patient evaluation:  20 min  Additional time spent reviewing pertinent lab tests and chart notes, and documentation:  5 min    Follow-up:  10/2022    TI Lowe MD, MS  Endocrinology  [facility]    CC:  CHUY Melendez

## 2022-07-12 NOTE — LETTER
7/12/2022         RE: Perlita Chavez  [address]  [facility] [address]        Dear Colleague,    Thank you for referring your patient, Perlita Chavez, to the [facility] [place]. Please see a copy of my visit note below.    Recent issues:  Diabetes follow-up evaluation, but 20 min late for today's appt time  Taking the Fiasp and Lantus insulin plan, also using the Freestyle Libre2 CGM sensor  Planning to get her COVID-19 booster soon  Noticing left middle finger contracture movements  Continues taking the Remicaid and (same) low dose Prednisone 4 mg daily          1972. Diagnosis of diabetes mellitus age 2 1/2, living in [facility]  Initial treatment with NPH and Regular insulin medications  Had seen Dr. Sarah Sierra/ Endocrinology  Switched to MDI insulin plan using Nv and Levemir insulin     8/19/14. Initial consult with me at my former clinic ([facility])  8/20/14. CDE RD evaluation, also tried demo OmniPod pump  Used Novolog Echo 1/2U pen, with I:C carb counting method  Tried Lantus BID dosing, then Tresiba (expensive), then Basaglar              Previously used Novolog insulin  5/2018. Switched to Fiasp 3/2018, then Fiasp non-formulary 2019  Meals typically brunch 10am and supper 9:30pm  Takes Prednisone for RA, current dose 4 mg QAM  Discussed hypoglycemia treatment options, jelly beans vs glucose tablets    Current DM meds:  Lantus Solostar                      13U each morning  Fiasp Flextouch                      1:13 ratio (typically 8Uper meal)                                                  sscale 1U per 40>125                                                  target  mg/dl     Has InsulinCalculator smart phone merry, but not used  Using Accucheck Guide BG meter              Tests 4x/day              Does own I:C and ISF calculations  9/2019. She has worn a free sample LibrePersonal continuous glucose sensor              Started Freestyle Mary CGM, then upgraded  to Libre2 CGM with [place]    Recent Freestyle Libre2 data:   14d avg 206 mg/dl   MN-6 201, 6-12p 180, 12-6p 203, 6-12a 242    Previous [facility] labs include:  Lab Results   Component Value Date    A1C 7.7 (H) 02/23/2022     02/23/2022    POTASSIUM 4.3 02/23/2022    CHLORIDE 105 02/23/2022    CO2 29 02/23/2022    ANIONGAP 6 02/23/2022     (H) 02/23/2022    BUN 13 02/23/2022    CR 0.89 02/23/2022    GFRESTIMATED 78 02/23/2022    GFRESTBLACK 87 02/19/2021    FINESSE 9.7 02/23/2022    CHOL 150 02/23/2022    TRIG 89 02/23/2022    HDL 65 02/23/2022    LDL 67 02/23/2022    NHDL 85 02/23/2022    UCRR 391 02/23/2022    MICROL 32 02/23/2022    UMALCR 8.18 02/23/2022     Lab Results   Component Value Date    TSH 3.33 02/23/2022    T4 0.96 02/18/2011     No history of vascular disease.  Takes simvastatin for hyperlipidemia management.  DM Complications:              Retinopathy                          Previous proliferative DR and bilateral laser PC surgeries                          Had cataracts, retinal detachment repair OD, higher occular pressures                          Significant vision loss, needs print magnification                          Sees Fer Davison and EBER Trevizo/ophthalmology, last eye exam with Dr. 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 Humberto Melendez/ [facility] for general medicine evaluations.  Also sees Dr. Blanco/[facility] rheumatology      PMH/PSH:  Past Medical History:   Diagnosis Date     Diabetic retinopathy associated with type 1 diabetes mellitus (H)     proliferative DR and bilateral laser PC surgeries     Hyperlipidemia LDL goal < 130      RA (rheumatoid arthritis) (H)     seeing rheumatologist.     Sjoegren syndrome     seeing rheumatologist.     Type  1 diabetes mellitus (H)     retinopathy, neuropathy     Visual impairment      Past Surgical History:   Procedure Laterality Date     [facility] NONSPECIFIC PROCEDURE  8/00/97    T & A     Z NONSPECIFIC PROCEDURE  5/00/99    L eye surgery     Z NONSPECIFIC PROCEDURE  4/10/01    R cataract + IOL     [facility] NONSPECIFIC PROCEDURE  2002    bilat vitrectomy     Z NONSPECIFIC PROCEDURE  7/04    R eye vitrectomy     Z NONSPECIFIC PROCEDURE  5/08    R vitrectomy & partial retinal detachment        Family Hx:  Family History   Problem Relation Age of Onset     Arthritis Mother         on celebrex,alive & healthy 2002     Breast Cancer Mother      Arthritis Father         on celebrex(2002)     Heart Disease Father         heart attack 1990, 60 years old (2002)     Diabetes Father      Cerebrovascular Disease Father         age 70     Thyroid Disease Sister         on thyroid med for couple of years , 35 yrs now(2002)     Cancer Paternal Grandmother         STOMACH     Cerebrovascular Disease Paternal Grandfather         age 90     Breast Cancer Cousin 55.00     Colorectal Cancer Cousin      Breast Cancer Other      Colorectal Cancer Other          Social Hx:  Social History     Socioeconomic History     Marital status: Single     Spouse name:      Number of children: 0     Years of education: Not on file     Highest education level: Not on file   Occupational History     Occupation: [employer]     Employer: [employer]   Tobacco Use     Smoking status: Never Smoker     Smokeless tobacco: Never Used   Substance and Sexual Activity     Alcohol use: Yes     Comment: 1 drink every 3 weeks     Drug use: No     Sexual activity: Not Currently     Partners: Male     Comment:    Other Topics Concern     Parent/sibling w/ CABG, MI or angioplasty before 65F 55M? Not Asked   Social History Narrative     Not on file     Social Determinants of Health     Financial Resource Strain: Not on file   Food Insecurity:  "Not on file   Transportation Needs: Not on file   Physical Activity: Not on file   Stress: Not on file   Social Connections: Not on file   Intimate Partner Violence: Not on file   Housing Stability: Not on file          MEDICATIONS:  has a current medication list which includes the following prescription(s): freestyle thea 2 sensor, lantus solostar, bd adelfo u/f, blood glucose, blood glucose, blood glucose monitoring, blood glucose, brimonidine, continuous blood glucose monitoring, restasis, diclofenac, dorzolamide-timolol, ferrous sulfate, folic acid, infliximab, insulin aspart, losartan, methotrexate, fish oil triple strength, one daily multiple vitamin, prednisone, simvastatin, sulfasalazine, and valacyclovir.        ROS: 10 point ROS neg other than the symptoms noted above in the HPI.     GENERAL: some fatigue; weight stable; denies fevers, chills, night sweats.   HEENT: minimal/no vision from right eye, dry eyes/mouth; no dysphagia, odonophagia, diplopia  THYROID:  no apparent hyper or hypothyroid symptoms  CV: no chest pain, pressure, palpitations  LUNGS: no SOB, FUENTES, cough, wheezing   ABDOMEN: rare morning nausea; no diarrhea, constipation, abdominal pain  EXTREMITIES: no rashes, ulcers, edema  NEUROLOGY: decreased sensation at feet; no headaches, denies changes in vision, tingling, extremitiy numbness   MSK: mild hand tendinitis pains; no muscle aches, weakness  SKIN: scalp hair thinning; denies rashes or lesions/foot sores  :  no menses since stopping OCP medication 1/2021  PSYCH:  stable mood, no significant anxiety or depression  ENDOCRINE: intermittent hot flashes    Physical Exam   VS: BP (!) 151/73   Pulse 82   Ht 1.613 m (5' 3.5\")   Wt 53.2 kg (117 lb 3.2 oz)   BMI 20.44 kg/m     CONSTITUTIONAL: healthy, alert and NAD, well dressed, answering questions appropriately  ENT: no nose swelling or nasal discharge, mouth redness or gum changes.  EYES: eyes grossly normal to inspection, conjunctivae and " sclerae normal, no exophthalmos or proptosis  THYROID:  no apparent nodules or goiter  LUNGS: no audible wheeze, cough or visible cyanosis, no visible retractions or increased work of breathing  ABDOMEN: abdomen normal size  EXTREMITIES: slowed flexion with left middle finger; no hand tremors, limited exam  NEUROLOGY: CN grossly intact, mentation intact and speech normal   SKIN:  no apparent skin lesions, rash, or edema with visualized skin appearance  PSYCH: mentation appears normal, affect normal/bright, judgement and insight intact,   normal speech and appearance well groomed    LABS:     All pertinent notes, labs, and images personally reviewed by me.        A/P:  Encounter Diagnoses   Name Primary?     Type 1 diabetes mellitus with stable proliferative retinopathy, unspecified laterality (H)      Type 1 diabetes mellitus with proliferative retinopathy, macular edema presence unspecified, unspecified laterality, unspecified proliferative retinopathy type (H)        Comments:  Reviewed the diabetes and health history  Recent SG trends indicate higher afternoon and evening trends  Reviewed and interpreted tests that I previously ordered.   Ordered appropriate tests for the endocrinology disease management.    Management options discussed and implemented after shared medical decision making with the patient.  T1DM problem is chronic-stable    Plan:  Discussed general issues with the diabetes diagnosis and managemen  We discussed the hgbA1c test which reflects previous overall glucose levels or control  Discussed the importance of blood glucose (BG) testing to assess glucose trends  Discussed use of the Accu-check Guide BG meter  Provided general overview of the multiple daily injection (MDI) plan using rapid acting mealtime               and longacting insulin medications  Reviewed benefits of using the Freestyle Libre2 CGM glucose sensor      Recommend:  Continue current insulin treatment and diabetes management,  with Fiasp mealtime & Lantus MDI plan  Increase the work midday snack (Sargento snack pack) + latte Fiasp insulin dose 3U to 4U.  Use the InsulinCalculator makerist merry as insulin dose calculator, discussed  If premeal sensor glucose at low end of range, reduce the Fiasp dose by 1-2 units  Continue use of the Freestyle Libre2 sensor  Goal target premeal glucose 80- 150 mg/dl  We have discussed hypoglycemia management, use of glucose tabs or jelly bean snack  Plan preappt labs in 9/2022   Lab orders placed  Continue the losartan daily dose  Continue current simvastatin lipid medication   Advise having fasting lipid panel testing and dilated eye examination at least annually     Patient to follow up with their Primary Care Provider regarding the elevated blood pressure.  Keep regular follow-up appointments with PCP, GYN, rheumatology  Addressed patient questions today     There are no Patient Instructions on file for this visit.    Future labs ordered today:   Orders Placed This Encounter   Procedures     Hemoglobin A1c     Vitamin D Deficiency     Basic metabolic panel     Radiology/Consults ordered today: None    Total time spent in with the patient evaluation:  20 min  Additional time spent reviewing pertinent lab tests and chart notes, and documentation:  5 min    Follow-up:  10/2022    TI Lowe MD, MS  Endocrinology  [facility]    CC:  CHUY Melendez                      Again, thank you for allowing me to participate in the care of your patient.        Sincerely,        Saurabh Lowe MD

## 2022-07-19 ENCOUNTER — TRANSFERRED RECORDS (OUTPATIENT)
Dept: INTERNAL MEDICINE | Facility: CLINIC | Age: 52
End: 2022-07-19

## 2022-07-19 LAB
ALT SERPL-CCNC: 33 IU/L (ref 5–35)
AST SERPL-CCNC: 35 U/L (ref 5–34)
CREATININE (EXTERNAL): 0.88 MG/DL (ref 0.5–1.3)
GFR ESTIMATED (EXTERNAL): 72 ML/MIN/1.73M2

## 2022-07-29 ENCOUNTER — TRANSFERRED RECORDS (OUTPATIENT)
Dept: HEALTH INFORMATION MANAGEMENT | Facility: CLINIC | Age: 52
End: 2022-07-29

## 2022-07-29 LAB — RETINOPATHY: NEGATIVE

## 2022-09-22 ENCOUNTER — LAB (OUTPATIENT)
Dept: LAB | Facility: CLINIC | Age: 52
End: 2022-09-22
Payer: COMMERCIAL

## 2022-09-22 ENCOUNTER — OFFICE VISIT (OUTPATIENT)
Dept: ENDOCRINOLOGY | Facility: CLINIC | Age: 52
End: 2022-09-22

## 2022-09-22 VITALS
DIASTOLIC BLOOD PRESSURE: 69 MMHG | WEIGHT: 119.5 LBS | BODY MASS INDEX: 20.4 KG/M2 | HEART RATE: 71 BPM | SYSTOLIC BLOOD PRESSURE: 135 MMHG | HEIGHT: 64 IN

## 2022-09-22 DIAGNOSIS — E10.3599 TYPE 1 DIABETES MELLITUS WITH PROLIFERATIVE RETINOPATHY, MACULAR EDEMA PRESENCE UNSPECIFIED, UNSPECIFIED LATERALITY, UNSPECIFIED PROLIFERATIVE RETINOPATHY TYPE (H): ICD-10-CM

## 2022-09-22 DIAGNOSIS — E10.3599 TYPE 1 DIABETES MELLITUS WITH PROLIFERATIVE RETINOPATHY, MACULAR EDEMA PRESENCE UNSPECIFIED, UNSPECIFIED LATERALITY, UNSPECIFIED PROLIFERATIVE RETINOPATHY TYPE (H): Primary | ICD-10-CM

## 2022-09-22 DIAGNOSIS — E10.40 TYPE 1 DIABETES MELLITUS WITH DIABETIC NEUROPATHY (H): ICD-10-CM

## 2022-09-22 LAB
ANION GAP SERPL CALCULATED.3IONS-SCNC: 5 MMOL/L (ref 3–14)
BUN SERPL-MCNC: 19 MG/DL (ref 7–30)
CALCIUM SERPL-MCNC: 9 MG/DL (ref 8.5–10.1)
CHLORIDE BLD-SCNC: 104 MMOL/L (ref 94–109)
CO2 SERPL-SCNC: 30 MMOL/L (ref 20–32)
CREAT SERPL-MCNC: 0.85 MG/DL (ref 0.52–1.04)
DEPRECATED CALCIDIOL+CALCIFEROL SERPL-MC: 37 UG/L (ref 20–75)
GFR SERPL CREATININE-BSD FRML MDRD: 82 ML/MIN/1.73M2
GLUCOSE BLD-MCNC: 171 MG/DL (ref 70–99)
HBA1C MFR BLD: 7.9 % (ref 0–5.6)
POTASSIUM BLD-SCNC: 3.7 MMOL/L (ref 3.4–5.3)
SODIUM SERPL-SCNC: 139 MMOL/L (ref 133–144)

## 2022-09-22 PROCEDURE — 83036 HEMOGLOBIN GLYCOSYLATED A1C: CPT

## 2022-09-22 PROCEDURE — 80048 BASIC METABOLIC PNL TOTAL CA: CPT

## 2022-09-22 PROCEDURE — 82306 VITAMIN D 25 HYDROXY: CPT

## 2022-09-22 PROCEDURE — 95251 CONT GLUC MNTR ANALYSIS I&R: CPT | Performed by: INTERNAL MEDICINE

## 2022-09-22 PROCEDURE — 99213 OFFICE O/P EST LOW 20 MIN: CPT | Performed by: INTERNAL MEDICINE

## 2022-09-22 PROCEDURE — 36415 COLL VENOUS BLD VENIPUNCTURE: CPT

## 2022-09-22 NOTE — LETTER
9/22/2022         RE: Perlita Chavez  [address]  [facility] [address]        Dear Colleague,    Thank you for referring your patient, Perlita Chaevz, to the [facility] [place]. Please see a copy of my visit note below.    Recent issues:  Diabetes follow-up evaluation  Taking the Fiasp and Lantus insulin plan, also using the Freestyle Libre2 CGM sensor  Persistent mild left middle finger contracture   Continues taking the Remicaid and (same) low dose Prednisone 4 mg daily          1972. Diagnosis of diabetes mellitus age 2 1/2, living in [facility]  Initial treatment with NPH and Regular insulin medications  Had seen Dr. Sarah Sierra/[facility]  Switched to MDI insulin plan using Nv and Levemir insulin     8/19/14. Initial consult with me at my former clinic ([facility])  8/20/14. CDE RD evaluation, also tried demo OmniPod pump  Used Novolog Echo 1/2U pen, with I:C carb counting method  Tried Lantus BID dosing, then Tresiba (expensive), then Basaglaru              Previously used Novolog insulin  5/2018. Switched to Fiasp 3/2018, then Fiasp non-formulary 2019  Meals typically brunch 10am and supper 9:30pm  Takes Prednisone for RA, current dose 4 mg QAM  Discussed hypoglycemia treatment options, jelly beans vs glucose tablets    Current DM meds:  Lantus Solostar                      13U each morning  Fiasp Flextouch                      1:13 ratio (typically 8Uper meal)                                                  sscale 1U per 40>125                                                  target  mg/dl     Has InsulinCalculator smart phone merry, but not used  Using Accucheck Guide BG meter              Tests 4x/day              Does own I:C and ISF calculations  9/2019. She has worn a free sample LibrePersonal continuous glucose sensor              Started Freestyle Mary CGM, then upgraded to Libre2 CGM with [employer]    Recent Freestyle Libre2 data:          Previous [facility]  labs include:  Lab Results   Component Value Date    A1C 7.9 (H) 09/22/2022     09/22/2022    POTASSIUM 3.7 09/22/2022    CHLORIDE 104 09/22/2022    CO2 30 09/22/2022    ANIONGAP 5 09/22/2022     (H) 09/22/2022    BUN 19 09/22/2022    CR 0.85 09/22/2022    GFRESTIMATED 82 09/22/2022    GFRESTBLACK 87 02/19/2021    FINESSE 9.0 09/22/2022    CHOL 150 02/23/2022    TRIG 89 02/23/2022    HDL 65 02/23/2022    LDL 67 02/23/2022    NHDL 85 02/23/2022    UCRR 391 02/23/2022    MICROL 32 02/23/2022    UMALCR 8.18 02/23/2022     Lab Results   Component Value Date    TSH 3.33 02/23/2022    T4 0.96 02/18/2011     No history of vascular disease.  Takes simvastatin for hyperlipidemia management.  DM Complications:              Retinopathy                          Previous proliferative DR and bilateral laser PC surgeries                          Had cataracts, retinal detachment repair OD, higher occular pressures                          Significant vision loss, needs print magnification                          Sees Fer Davison and EBER Trevizo/ophthalmology, last eye exam with Dr. 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  Also sees Dr. Blanco/[facility] rheumatology      PMH/PSH:  Past Medical History:   Diagnosis Date     Diabetic retinopathy associated with type 1 diabetes mellitus (H)     proliferative DR and bilateral laser PC surgeries     Hyperlipidemia LDL goal < 130      RA (rheumatoid arthritis) (H)     seeing rheumatologist.     Sjoegren syndrome     seeing rheumatologist.     Type 1 diabetes mellitus (H)     retinopathy, neuropathy     Visual impairment      Past Surgical History:   Procedure Laterality Date     [facility]  NONSPECIFIC PROCEDURE  8/00/97    T & A     Z NONSPECIFIC PROCEDURE  5/00/99    L eye surgery     Z NONSPECIFIC PROCEDURE  4/10/01    R cataract + IOL     Z NONSPECIFIC PROCEDURE  2002    bilat vitrectomy     [facility] NONSPECIFIC PROCEDURE  7/04    R eye vitrectomy     Z NONSPECIFIC PROCEDURE  5/08    R vitrectomy & partial retinal detachment        Family Hx:  Family History   Problem Relation Age of Onset     Arthritis Mother         on celebrex,alive & healthy 2002     Breast Cancer Mother      Arthritis Father         on celebrex(2002)     Heart Disease Father         heart attack 1990, 60 years old (2002)     Diabetes Father      Cerebrovascular Disease Father         age 70     Thyroid Disease Sister         on thyroid med for couple of years , 35 yrs now(2002)     Cancer Paternal Grandmother         STOMACH     Cerebrovascular Disease Paternal Grandfather         age 90     Breast Cancer Cousin 55.00     Colorectal Cancer Cousin      Breast Cancer Other      Colorectal Cancer Other          Social Hx:  Social History     Socioeconomic History     Marital status: Single     Spouse name:      Number of children: 0     Years of education: Not on file     Highest education level: Not on file   Occupational History     Occupation: [employer]     Employer: [employer]   Tobacco Use     Smoking status: Never Smoker     Smokeless tobacco: Never Used   Substance and Sexual Activity     Alcohol use: Yes     Comment: 1 drink every 3 weeks     Drug use: No     Sexual activity: Not Currently     Partners: Male     Comment:    Other Topics Concern     Parent/sibling w/ CABG, MI or angioplasty before 65F 55M? Not Asked   Social History Narrative     Not on file     Social Determinants of Health     Financial Resource Strain: Not on file   Food Insecurity: Not on file   Transportation Needs: Not on file   Physical Activity: Not on file   Stress: Not on file   Social Connections: Not on file  "  Intimate Partner Violence: Not on file   Housing Stability: Not on file          MEDICATIONS:  has a current medication list which includes the following prescription(s): brimonidine, freestyle thea 2 sensor, restasis, diclofenac, dorzolamide-timolol, ferrous sulfate, folic acid, infliximab, insulin aspart, lantus solostar, losartan, methotrexate, fish oil triple strength, one daily multiple vitamin, prednisone, simvastatin, sulfasalazine, valacyclovir, blood glucose, blood glucose, blood glucose monitoring, blood glucose, continuous blood glucose monitoring, and bd adelfo u/f.        ROS: 10 point ROS neg other than the symptoms noted above in the HPI.     GENERAL: some fatigue; weight stable; denies fevers, chills, night sweats.   HEENT: minimal/no vision from right eye, dry eyes/mouth; no dysphagia, odonophagia, diplopia  THYROID:  no apparent hyper or hypothyroid symptoms  CV: no chest pain, pressure, palpitations  LUNGS: no SOB, FUENTES, cough, wheezing   ABDOMEN: rare morning nausea; no diarrhea, constipation, abdominal pain  EXTREMITIES: no rashes, ulcers, edema  NEUROLOGY: decreased sensation at feet; no headaches, denies changes in vision, tingling, extremitiy numbness   MSK: mild hand tendinitis pains; no muscle aches, weakness  SKIN: scalp hair thinning; denies rashes or lesions/foot sores  :  no menses since stopping OCP medication 1/2021  PSYCH:  stable mood, no significant anxiety or depression  ENDOCRINE: intermittent hot flashes    Physical Exam   VS: /69   Pulse 71   Ht 1.613 m (5' 3.5\")   Wt 54.2 kg (119 lb 8 oz)   BMI 20.84 kg/m     CONSTITUTIONAL: healthy, alert and NAD, well dressed, answering questions appropriately  ENT: no nose swelling or nasal discharge, mouth redness or gum changes.  EYES: eyes grossly normal to inspection, conjunctivae and sclerae normal, no exophthalmos or proptosis  THYROID:  no apparent nodules or goiter  LUNGS: no audible wheeze, cough or visible cyanosis, no " visible retractions or increased work of breathing  ABDOMEN: abdomen normal size  EXTREMITIES: slowed flexion with left middle finger; no hand tremors, limited exam  NEUROLOGY: CN grossly intact, mentation intact and speech normal   SKIN:  no apparent skin lesions, rash, or edema with visualized skin appearance  PSYCH: mentation appears normal, affect normal/bright, judgement and insight intact,   normal speech and appearance well groomed    LABS:     All pertinent notes, labs, and images personally reviewed by me.        A/P:  Encounter Diagnoses   Name Primary?     Type 1 diabetes mellitus with proliferative retinopathy, macular edema presence unspecified, unspecified laterality, unspecified proliferative retinopathy type (H) Yes     Type 1 diabetes mellitus with diabetic neuropathy (H)        Comments:  Reviewed the diabetes and health history  Recent SG trends indicate higher afternoon and evening trends  Reviewed and interpreted tests that I previously ordered.   Ordered appropriate tests for the endocrinology disease management.    Management options discussed and implemented after shared medical decision making with the patient.  T1DM problem is chronic-stable    Plan:  Discussed general issues with the diabetes diagnosis and managemen  We discussed the hgbA1c test which reflects previous overall glucose levels or control  Discussed the importance of blood glucose (BG) testing to assess glucose trends  Discussed use of the Accu-check Guide BG meter  Provided general overview of the multiple daily injection (MDI) plan using rapid acting mealtime               and longacting insulin medications  Reviewed recent Freestyle Libre2 CGM glucose sensor trend data, in detail      Recommend:  Continue current insulin treatment and diabetes management, with Fiasp mealtime & Lantus MDI plan  Lantus Solostar                      13U each morning  Fiasp Flextouch                         Breakfast  1:13 (typically 8Uper  meal)   Supper   1:12                                                  sscale 1U per 40>125                                                  target  mg/dl    Increase the work midday snack ([employer] snack pack) + latte Fiasp insulin dose 3U to 4U.  Consider using the InsulinCalculator iPhone merry as insulin dose calculator, discussed  If premeal sensor glucose at low end of range, reduce the Fiasp dose by 1-2 units  Continue use of the Freestyle sensor   She is currently using Libre2 CGM with [place]   Consider upgrading the iPhone and then download Libre3 merry, change to Freestyle Libre3 CGM  Goal target premeal glucose 80- 150 mg/dl  We have discussed hypoglycemia management, use of glucose tabs or jelly bean snack  Needs repeat lab tests soon   Consider testing at PCP appt   Lab orders placed  Continue the losartan daily dose  Continue current simvastatin lipid medication   Advise having fasting lipid panel testing and dilated eye examination at least annually     Addressed patient questions today    There are no Patient Instructions on file for this visit.    Future labs ordered today:   Orders Placed This Encounter   Procedures     GLUCOSE MONITOR, 72 HOUR, PHYS INTERP     TSH     Hemoglobin A1c     Radiology/Consults ordered today: None    Total time spent in with the patient evaluation:  16 min  Additional time spent reviewing pertinent lab tests and chart notes, and documentation:  5 min    Follow-up:  1/2023, Return    TI Lowe MD, MS  Endocrinology  [facility]    CC:  CHUY Melendez                      Again, thank you for allowing me to participate in the care of your patient.        Sincerely,        Saurabh Lowe MD

## 2022-09-22 NOTE — PROGRESS NOTES
Recent issues:  Diabetes follow-up evaluation  Taking the Fiasp and Lantus insulin plan, also using the Freestyle Libre2 CGM sensor  Persistent mild left middle finger contracture   Continues taking the Remicaid and (same) low dose Prednisone 4 mg daily          1972. Diagnosis of diabetes mellitus age 2 1/2, living in [facility]  Initial treatment with NPH and Regular insulin medications  Had seen Dr. Sarah Sierra/[facility]  Switched to MDI insulin plan using Nv and Levemir insulin     8/19/14. Initial consult with me at my former clinic ([facility])  8/20/14. CDE RD evaluation, also tried demo OmniPod pump  Used Novolog Echo 1/2U pen, with I:C carb counting method  Tried Lantus BID dosing, then Tresiba (expensive), then Basaglaru              Previously used Novolog insulin  5/2018. Switched to Fiasp 3/2018, then Fiasp non-formulary 2019  Meals typically brunch 10am and supper 9:30pm  Takes Prednisone for RA, current dose 4 mg QAM  Discussed hypoglycemia treatment options, jelly beans vs glucose tablets    Current DM meds:  Lantus Solostar                      13U each morning  Fiasp Flextouch                      1:13 ratio (typically 8Uper meal)                                                  sscale 1U per 40>125                                                  target  mg/dl     Has InsulinCalculator smart phone merry, but not used  Using Accucheck Guide BG meter              Tests 4x/day              Does own I:C and ISF calculations  9/2019. She has worn a free sample LibrePersonal continuous glucose sensor              Started Freestyle Mary CGM, then upgraded to Libre2 CGM with [place]    Recent Freestyle Libre2 data:          Previous  labs include:  Lab Results   Component Value Date    A1C 7.9 (H) 09/22/2022     09/22/2022    POTASSIUM 3.7 09/22/2022    CHLORIDE 104 09/22/2022    CO2 30 09/22/2022    ANIONGAP 5 09/22/2022     (H) 09/22/2022    BUN 19 09/22/2022    CR 0.85  09/22/2022    GFRESTIMATED 82 09/22/2022    GFRESTBLACK 87 02/19/2021    FINESSE 9.0 09/22/2022    CHOL 150 02/23/2022    TRIG 89 02/23/2022    HDL 65 02/23/2022    LDL 67 02/23/2022    NHDL 85 02/23/2022    UCRR 391 02/23/2022    MICROL 32 02/23/2022    UMALCR 8.18 02/23/2022     Lab Results   Component Value Date    TSH 3.33 02/23/2022    T4 0.96 02/18/2011     No history of vascular disease.  Takes simvastatin for hyperlipidemia management.  DM Complications:              Retinopathy                          Previous proliferative DR and bilateral laser PC surgeries                          Had cataracts, retinal detachment repair OD, higher occular pressures                          Significant vision loss, needs print magnification                          Sees Fer Davison and EBER Treivzo/ophthalmology, last eye exam with Dr. 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 Humberto Melendez/[facility] for general medicine evaluations.  Also sees Dr. Blanco/[facility] rheumatology      PMH/PSH:  Past Medical History:   Diagnosis Date     Diabetic retinopathy associated with type 1 diabetes mellitus (H)     proliferative DR and bilateral laser PC surgeries     Hyperlipidemia LDL goal < 130      RA (rheumatoid arthritis) (H)     seeing rheumatologist.     Sjoegren syndrome     seeing rheumatologist.     Type 1 diabetes mellitus (H)     retinopathy, neuropathy     Visual impairment      Past Surgical History:   Procedure Laterality Date     [facility] NONSPECIFIC PROCEDURE  8/00/97    T & A     [facility] NONSPECIFIC PROCEDURE  5/00/99    L eye surgery     [facility] NONSPECIFIC PROCEDURE  4/10/01    R cataract + IOL     Z NONSPECIFIC PROCEDURE  2002    bilat vitrectomy     [facility] NONSPECIFIC PROCEDURE  7/04    R eye  vitrectomy     [facility] NONSPECIFIC PROCEDURE  5/08    R vitrectomy & partial retinal detachment        Family Hx:  Family History   Problem Relation Age of Onset     Arthritis Mother         on celebrex,alive & healthy 2002     Breast Cancer Mother      Arthritis Father         on celebrex(2002)     Heart Disease Father         heart attack 1990, 60 years old (2002)     Diabetes Father      Cerebrovascular Disease Father         age 70     Thyroid Disease Sister         on thyroid med for couple of years , 35 yrs now(2002)     Cancer Paternal Grandmother         STOMACH     Cerebrovascular Disease Paternal Grandfather         age 90     Breast Cancer Cousin 55.00     Colorectal Cancer Cousin      Breast Cancer Other      Colorectal Cancer Other          Social Hx:  Social History     Socioeconomic History     Marital status: Single     Spouse name:      Number of children: 0     Years of education: Not on file     Highest education level: Not on file   Occupational History     Occupation: [employer]     Employer: [employer]   Tobacco Use     Smoking status: Never Smoker     Smokeless tobacco: Never Used   Substance and Sexual Activity     Alcohol use: Yes     Comment: 1 drink every 3 weeks     Drug use: No     Sexual activity: Not Currently     Partners: Male     Comment:    Other Topics Concern     Parent/sibling w/ CABG, MI or angioplasty before 65F 55M? Not Asked   Social History Narrative     Not on file     Social Determinants of Health     Financial Resource Strain: Not on file   Food Insecurity: Not on file   Transportation Needs: Not on file   Physical Activity: Not on file   Stress: Not on file   Social Connections: Not on file   Intimate Partner Violence: Not on file   Housing Stability: Not on file          MEDICATIONS:  has a current medication list which includes the following prescription(s): brimonidine, freestyle thea 2 sensor, restasis, diclofenac, dorzolamide-timolol,  "ferrous sulfate, folic acid, infliximab, insulin aspart, lantus solostar, losartan, methotrexate, fish oil triple strength, one daily multiple vitamin, prednisone, simvastatin, sulfasalazine, valacyclovir, blood glucose, blood glucose, blood glucose monitoring, blood glucose, continuous blood glucose monitoring, and bd adelfo u/f.        ROS: 10 point ROS neg other than the symptoms noted above in the HPI.     GENERAL: some fatigue; weight stable; denies fevers, chills, night sweats.   HEENT: minimal/no vision from right eye, dry eyes/mouth; no dysphagia, odonophagia, diplopia  THYROID:  no apparent hyper or hypothyroid symptoms  CV: no chest pain, pressure, palpitations  LUNGS: no SOB, FUENTES, cough, wheezing   ABDOMEN: rare morning nausea; no diarrhea, constipation, abdominal pain  EXTREMITIES: no rashes, ulcers, edema  NEUROLOGY: decreased sensation at feet; no headaches, denies changes in vision, tingling, extremitiy numbness   MSK: mild hand tendinitis pains; no muscle aches, weakness  SKIN: scalp hair thinning; denies rashes or lesions/foot sores  :  no menses since stopping OCP medication 1/2021  PSYCH:  stable mood, no significant anxiety or depression  ENDOCRINE: intermittent hot flashes    Physical Exam   VS: /69   Pulse 71   Ht 1.613 m (5' 3.5\")   Wt 54.2 kg (119 lb 8 oz)   BMI 20.84 kg/m     CONSTITUTIONAL: healthy, alert and NAD, well dressed, answering questions appropriately  ENT: no nose swelling or nasal discharge, mouth redness or gum changes.  EYES: eyes grossly normal to inspection, conjunctivae and sclerae normal, no exophthalmos or proptosis  THYROID:  no apparent nodules or goiter  LUNGS: no audible wheeze, cough or visible cyanosis, no visible retractions or increased work of breathing  ABDOMEN: abdomen normal size  EXTREMITIES: slowed flexion with left middle finger; no hand tremors, limited exam  NEUROLOGY: CN grossly intact, mentation intact and speech normal   SKIN:  no apparent " skin lesions, rash, or edema with visualized skin appearance  PSYCH: mentation appears normal, affect normal/bright, judgement and insight intact,   normal speech and appearance well groomed    LABS:     All pertinent notes, labs, and images personally reviewed by me.        A/P:  Encounter Diagnoses   Name Primary?     Type 1 diabetes mellitus with proliferative retinopathy, macular edema presence unspecified, unspecified laterality, unspecified proliferative retinopathy type (H) Yes     Type 1 diabetes mellitus with diabetic neuropathy (H)        Comments:  Reviewed the diabetes and health history  Recent SG trends indicate higher afternoon and evening trends  Reviewed and interpreted tests that I previously ordered.   Ordered appropriate tests for the endocrinology disease management.    Management options discussed and implemented after shared medical decision making with the patient.  T1DM problem is chronic-stable    Plan:  Discussed general issues with the diabetes diagnosis and managemen  We discussed the hgbA1c test which reflects previous overall glucose levels or control  Discussed the importance of blood glucose (BG) testing to assess glucose trends  Discussed use of the Accu-check Guide BG meter  Provided general overview of the multiple daily injection (MDI) plan using rapid acting mealtime               and longacting insulin medications  Reviewed recent Freestyle Libre2 CGM glucose sensor trend data, in detail      Recommend:  Continue current insulin treatment and diabetes management, with Fiasp mealtime & Lantus MDI plan  Lantus Solostar                      13U each morning  Fiasp Flextouch                         Breakfast  1:13 (typically 8Uper meal)   Supper   1:12                                                  sscale 1U per 40>125                                                  target  mg/dl    Increase the work midday snack ([employer] snack pack) + latte Fiasp insulin dose 3U to  4U.  Consider using the InsulinCalculator iPhone merry as insulin dose calculator, discussed  If premeal sensor glucose at low end of range, reduce the Fiasp dose by 1-2 units  Continue use of the Freestyle sensor   She is currently using Libre2 CGM with [place]   Consider upgrading the iPhone and then download Libre3 merry, change to Freestyle Libre3 CGM  Goal target premeal glucose 80- 150 mg/dl  We have discussed hypoglycemia management, use of glucose tabs or jelly bean snack  Needs repeat lab tests soon   Consider testing at PCP appt   Lab orders placed  Continue the losartan daily dose  Continue current simvastatin lipid medication   Advise having fasting lipid panel testing and dilated eye examination at least annually     Addressed patient questions today    There are no Patient Instructions on file for this visit.    Future labs ordered today:   Orders Placed This Encounter   Procedures     GLUCOSE MONITOR, 72 HOUR, PHYS INTERP     TSH     Hemoglobin A1c     Radiology/Consults ordered today: None    Total time spent in with the patient evaluation:  16 min  Additional time spent reviewing pertinent lab tests and chart notes, and documentation:  5 min    Follow-up:  1/2023, Return    TI Lowe MD, MS  Endocrinology  [facility]    CC:  CHUY Melendez

## 2022-10-11 ENCOUNTER — TRANSFERRED RECORDS (OUTPATIENT)
Dept: INTERNAL MEDICINE | Facility: CLINIC | Age: 52
End: 2022-10-11

## 2022-10-11 ENCOUNTER — TRANSFERRED RECORDS (OUTPATIENT)
Dept: HEALTH INFORMATION MANAGEMENT | Facility: CLINIC | Age: 52
End: 2022-10-11

## 2022-10-11 LAB
ALT SERPL-CCNC: 28 IU/L (ref 5–35)
AST SERPL-CCNC: 33 U/L (ref 5–34)
CREATININE (EXTERNAL): 0.9 MG/DL (ref 0.5–1.3)

## 2022-10-14 DIAGNOSIS — E10.3599 TYPE 1 DIABETES MELLITUS WITH PROLIFERATIVE RETINOPATHY, MACULAR EDEMA PRESENCE UNSPECIFIED, UNSPECIFIED LATERALITY, UNSPECIFIED PROLIFERATIVE RETINOPATHY TYPE (H): ICD-10-CM

## 2022-10-14 RX ORDER — INSULIN ASPART INJECTION 100 [IU]/ML
INJECTION, SOLUTION SUBCUTANEOUS
Qty: 15 ML | Refills: 5 | Status: SHIPPED | OUTPATIENT
Start: 2022-10-14 | End: 2024-04-03

## 2022-10-14 NOTE — TELEPHONE ENCOUNTER
Last Written Prescription Date:  11/30/2021  Last Fill Quantity: 15,  # refills: 5   Last office visit: 9/22/2022 with prescribing provider:     Future Office Visit:

## 2022-10-14 NOTE — TELEPHONE ENCOUNTER
Requested Prescriptions   Pending Prescriptions Disp Refills     insulin aspart (FIASP FLEXTOUCH) 100 UNIT/ML pen-injector 15 mL 5     Sig: ADMINISTER 3 TO 8 UNITS UNDER THE SKIN THREE TIMES DAILY WITH MEALS Strength: 100 UNIT/ML       There is no refill protocol information for this order        Next appt: 1/18/23  Will send refills per protocol

## 2022-10-16 ENCOUNTER — HEALTH MAINTENANCE LETTER (OUTPATIENT)
Age: 52
End: 2022-10-16

## 2022-11-11 DIAGNOSIS — I10 ESSENTIAL HYPERTENSION: ICD-10-CM

## 2022-11-11 DIAGNOSIS — E10.3599 TYPE 1 DIABETES MELLITUS WITH PROLIFERATIVE RETINOPATHY, MACULAR EDEMA PRESENCE UNSPECIFIED, UNSPECIFIED LATERALITY, UNSPECIFIED PROLIFERATIVE RETINOPATHY TYPE (H): ICD-10-CM

## 2022-11-14 RX ORDER — LOSARTAN POTASSIUM 25 MG/1
TABLET ORAL
Qty: 90 TABLET | Refills: 0 | Status: SHIPPED | OUTPATIENT
Start: 2022-11-14 | End: 2023-02-20

## 2022-12-29 ENCOUNTER — TRANSFERRED RECORDS (OUTPATIENT)
Dept: HEALTH INFORMATION MANAGEMENT | Facility: CLINIC | Age: 52
End: 2022-12-29

## 2022-12-29 LAB — RETINOPATHY: POSITIVE

## 2023-01-18 ENCOUNTER — OFFICE VISIT (OUTPATIENT)
Dept: ENDOCRINOLOGY | Facility: CLINIC | Age: 53
End: 2023-01-18
Payer: COMMERCIAL

## 2023-01-18 VITALS
HEIGHT: 64 IN | WEIGHT: 117.1 LBS | BODY MASS INDEX: 19.99 KG/M2 | HEART RATE: 91 BPM | DIASTOLIC BLOOD PRESSURE: 74 MMHG | SYSTOLIC BLOOD PRESSURE: 133 MMHG

## 2023-01-18 DIAGNOSIS — E10.40 TYPE 1 DIABETES MELLITUS WITH DIABETIC NEUROPATHY (H): ICD-10-CM

## 2023-01-18 DIAGNOSIS — E10.3599 TYPE 1 DIABETES MELLITUS WITH PROLIFERATIVE RETINOPATHY, MACULAR EDEMA PRESENCE UNSPECIFIED, UNSPECIFIED LATERALITY, UNSPECIFIED PROLIFERATIVE RETINOPATHY TYPE (H): Primary | ICD-10-CM

## 2023-01-18 PROCEDURE — 95251 CONT GLUC MNTR ANALYSIS I&R: CPT | Performed by: INTERNAL MEDICINE

## 2023-01-18 PROCEDURE — 99214 OFFICE O/P EST MOD 30 MIN: CPT | Performed by: INTERNAL MEDICINE

## 2023-01-18 RX ORDER — LATANOPROST 50 UG/ML
SOLUTION/ DROPS OPHTHALMIC
COMMUNITY
Start: 2023-01-16

## 2023-01-18 NOTE — PROGRESS NOTES
"Recent issues:  Diabetes follow-up evaluation  Taking the Fiasp and Lantus insulin plan, also using the Freestyle Libre2 CGM sensor  Persistent mild left middle finger contracture   Continues taking the Remicaid and (same) low dose Prednisone 3 mg daily  [place] of a left eye \"haze\" problem, saw ophthalmologist and started a new eye drop  Plans jury duty 1/30/23 1972. Diagnosis of diabetes mellitus age 2 1/2, living in [facility]  Initial treatment with NPH and Regular insulin medications  Had seen Dr. Sarah Sierra/ Endocrinology  Switched to MDI insulin plan using Nv and Levemir insulin     8/19/14. Initial consult with me at my former clinic ([facility])  8/20/14. CDE RD evaluation, also tried demo OmniPod pump  Used Novolog Echo 1/2U pen, with I:C carb counting method  Tried Lantus BID dosing, then Tresiba (expensive), then Basaglaru              Previously used Novolog insulin  5/2018. Switched to Fiasp 3/2018, then Fiasp non-formulary 2019  Meals typically brunch 10am and supper 9:30pm  Takes Prednisone for RA, current dose 3 mg QAM  Discussed hypoglycemia treatment options, jelly beans vs glucose tablets    Current DM meds:  Lantus Solostar                      13U each morning  Fiasp Flextouch                         Breakfast  1:13 (typically 6-7Uper meal)   Supper   1:12                                                  sscale 1U per 40>125                                                  target  mg/dl     Has InsulinCalculator smart phone merry, but not used  Using Accucheck Guide BG meter              Tests 4x/day              Does own I:C and ISF calculations  9/2019. She has worn a free sample LibrePersonal continuous glucose sensor              Started Freestyle Mary CGM, then upgraded to Libre2 CGM with [place]    Recent Freestyle Libre2 data:          Previous  labs include:  Lab Results   Component Value Date    A1C 7.9 (H) 09/22/2022     09/22/2022    POTASSIUM 3.7 09/22/2022 "    CHLORIDE 104 09/22/2022    CO2 30 09/22/2022    ANIONGAP 5 09/22/2022     (H) 09/22/2022    BUN 19 09/22/2022    CR 0.85 09/22/2022    GFRESTIMATED 82 09/22/2022    GFRESTBLACK 87 02/19/2021    FINESSE 9.0 09/22/2022    CHOL 150 02/23/2022    TRIG 89 02/23/2022    HDL 65 02/23/2022    LDL 67 02/23/2022    NHDL 85 02/23/2022    UCRR 391 02/23/2022    MICROL 32 02/23/2022    UMALCR 8.18 02/23/2022     Lab Results   Component Value Date    TSH 3.33 02/23/2022    T4 0.96 02/18/2011     No history of vascular disease.  Takes simvastatin for hyperlipidemia management.  DM Complications:              Retinopathy                          Previous proliferative DR and bilateral laser PC surgeries                          Had cataracts, retinal detachment repair OD, higher occular pressures                          Significant vision loss, needs print magnification                          Sees Fer Davison and LUCY Ahmadi/ophthalmology              Neuropathy:                          Decreased sensation in feet          Single, lives in [place], [place]; works at [employer] at [place]  Exercises at [employer] [place] [address] [place], enjoys exercise classes  Sees Dr. Humberto Melendez/[facility] for general medicine evaluations.  Also sees Dr. Blanco/[facility] rheumatology      PMH/PSH:  Past Medical History:   Diagnosis Date     Diabetic retinopathy associated with type 1 diabetes mellitus (H)     proliferative DR and bilateral laser PC surgeries     Hyperlipidemia LDL goal < 130      RA (rheumatoid arthritis) (H)     seeing rheumatologist.     Sjoegren syndrome     seeing rheumatologist.     Type 1 diabetes mellitus (H)     retinopathy, neuropathy     Visual impairment      Past Surgical History:   Procedure Laterality Date     [facility] NONSPECIFIC PROCEDURE  8/00/97    T & A     Z NONSPECIFIC PROCEDURE  5/00/99    L eye surgery     [facility] NONSPECIFIC PROCEDURE  4/10/01    R cataract + IOL     [facility]  NONSPECIFIC PROCEDURE  2002    bilat vitrectomy     [facility] NONSPECIFIC PROCEDURE  7/04    R eye vitrectomy     [facility] NONSPECIFIC PROCEDURE  5/08    R vitrectomy & partial retinal detachment        Family Hx:  Family History   Problem Relation Age of Onset     Arthritis Mother         on celebrex,alive & healthy 2002     Breast Cancer Mother      Arthritis Father         on celebrex(2002)     Heart Disease Father         heart attack 1990, 60 years old (2002)     Diabetes Father      Cerebrovascular Disease Father         age 70     Thyroid Disease Sister         on thyroid med for couple of years , 35 yrs now(2002)     Cancer Paternal Grandmother         STOMACH     Cerebrovascular Disease Paternal Grandfather         age 90     Breast Cancer Cousin 55.00     Colorectal Cancer Cousin      Breast Cancer Other      Colorectal Cancer Other          Social Hx:  Social History     Socioeconomic History     Marital status: Single     Spouse name:      Number of children: 0     Years of education: Not on file     Highest education level: Not on file   Occupational History     Occupation: [employer]     Employer: [employer]   Tobacco Use     Smoking status: Never     Smokeless tobacco: Never   Substance and Sexual Activity     Alcohol use: Yes     Comment: 1 drink every 3 weeks     Drug use: No     Sexual activity: Not Currently     Partners: Male     Comment:    Other Topics Concern     Parent/sibling w/ CABG, MI or angioplasty before 65F 55M? Not Asked   Social History Narrative     Not on file     Social Determinants of Health     Financial Resource Strain: Not on file   Food Insecurity: Not on file   Transportation Needs: Not on file   Physical Activity: Not on file   Stress: Not on file   Social Connections: Not on file   Intimate Partner Violence: Not on file   Housing Stability: Not on file          MEDICATIONS:  has a current medication list which includes the following prescription(s):  "brimonidine, freestyle thea 2 sensor, restasis, diclofenac, dorzolamide-timolol, ferrous sulfate, folic acid, infliximab, insulin aspart, lantus solostar, latanoprost, losartan, methotrexate, fish oil triple strength, one daily multiple vitamin, prednisone, simvastatin, sulfasalazine, valacyclovir, blood glucose, blood glucose, blood glucose monitoring, blood glucose, continuous blood glucose monitoring, and bd adelfo u/f.        ROS: 10 point ROS neg other than the symptoms noted above in the HPI.     GENERAL: some fatigue; weight stable; denies fevers, chills, night sweats.   HEENT: minimal/no vision from right eye, dry eyes/mouth; no dysphagia, odonophagia, diplopia  THYROID:  no apparent hyper or hypothyroid symptoms  CV: no chest pain, pressure, palpitations  LUNGS: no SOB, FUENTES, cough, wheezing   ABDOMEN: rare morning nausea; no diarrhea, constipation, abdominal pain  EXTREMITIES: no rashes, ulcers, edema  NEUROLOGY: decreased sensation at feet; no headaches, denies changes in vision, tingling, extremitiy numbness   MSK: mild hand tendinitis pains; no muscle aches, weakness  SKIN: scalp hair thinning; denies rashes or lesions/foot sores  :  no menses since stopping OCP medication 1/2021  PSYCH:  stable mood, no significant anxiety or depression  ENDOCRINE: intermittent hot flashes    Physical Exam   VS: /74   Pulse 91   Ht 1.613 m (5' 3.5\")   Wt 53.1 kg (117 lb 1.6 oz)   BMI 20.42 kg/m     CONSTITUTIONAL: healthy, alert and NAD, well dressed, answering questions appropriately  ENT: no nose swelling or nasal discharge, mouth redness or gum changes.  EYES: eyes grossly normal to inspection, conjunctivae and sclerae normal, no exophthalmos or proptosis  THYROID:  no apparent nodules or goiter  LUNGS: no audible wheeze, cough or visible cyanosis, no visible retractions or increased work of breathing  ABDOMEN: abdomen normal size  EXTREMITIES: slowed flexion with left middle finger; no hand tremors, " limited exam  NEUROLOGY: CN grossly intact, mentation intact and speech normal   SKIN:  no apparent skin lesions, rash, or edema with visualized skin appearance  PSYCH: mentation appears normal, affect normal/bright, judgement and insight intact,   normal speech and appearance well groomed    LABS:     All pertinent notes, labs, and images personally reviewed by me.        A/P:  Encounter Diagnoses   Name Primary?     Type 1 diabetes mellitus with proliferative retinopathy, macular edema presence unspecified, unspecified laterality, unspecified proliferative retinopathy type (H) Yes     Type 1 diabetes mellitus with diabetic neuropathy (H)        Comments:  Reviewed the diabetes and health history  Recent SG trends indicate higher afternoon and evening trends  Reviewed and interpreted tests that I previously ordered.   Ordered appropriate tests for the endocrinology disease management.    Management options discussed and implemented after shared medical decision making with the patient.  T1DM problem is chronic-stable    Plan:  Discussed general issues with the diabetes diagnosis and managemen  We discussed the hgbA1c test which reflects previous overall glucose levels or control  Discussed the importance of blood glucose (BG) testing to assess glucose trends  Discussed use of the Accu-check Guide BG meter  Provided general overview of the multiple daily injection (MDI) plan using rapid acting mealtime               and longacting insulin medications  Reviewed recent Freestyle Libre2 CGM glucose sensor trend data, in detail      Recommend:  Continue current insulin treatment and diabetes management, with Fiasp mealtime & Lantus MDI plan  Lantus Solostar                      12U each morning  Fiasp Flextouch                         Breakfast  1:12 (typically 6-7Uper meal)   Supper   1:12                                                  sscale 1U per 40>125                                                  target BG  125 mg/dl    Increase the work midday snack ([place] snack pack) + latte Fiasp insulin dose 3U to 4U.  Consider using the InsulinCalculator iPhone merry as insulin dose calculator, discussed  If premeal sensor glucose at low end of range, reduce the Fiasp dose by 1-2 units  Continue use of the Freestyle sensor   She is currently using Libre2 CGM with [place]   Consider upgrading the iPhone and then download Libre3 merry, change to Freestyle Libre3 CGM  Goal target premeal glucose 80- 150 mg/dl  We have discussed hypoglycemia management, use of glucose tabs or jelly bean snack  Needs repeat lab tests soon   Consider testing at PCP appt   Lab orders placed  Continue the losartan daily dose  Continue current simvastatin lipid medication   Advise having fasting lipid panel testing and dilated eye examination at least annually     Addressed patient questions today    There are no Patient Instructions on file for this visit.    Future labs ordered today:   Orders Placed This Encounter   Procedures     GLUCOSE MONITOR, 72 HOUR, PHYS INTERP     Hemoglobin A1c     Basic metabolic panel     Albumin Random Urine Quantitative with Creat Ratio     TSH with free T4 reflex     Radiology/Consults ordered today: None    Total time spent on day of encounter:  22 min    Follow-up:  4/27/23, Return    TI Lowe MD, MS  Endocrinology  [facility]    CC:  CHUY Melendez

## 2023-01-18 NOTE — LETTER
"    1/18/2023         RE: Perlita Chavez  [address]  [facility] [address]        Dear Colleague,    Thank you for referring your patient, Perlita Chavez, to the [facility] [place]. Please see a copy of my visit note below.    Recent issues:  Diabetes follow-up evaluation  Taking the Fiasp and Lantus insulin plan, also using the Freestyle Libre2 CGM sensor  Persistent mild left middle finger contracture   Continues taking the Remicaid and (same) low dose Prednisone 3 mg daily  [place] of a left eye \"haze\" problem, saw ophthalmologist and started a new eye drop  Plans jury duty 1/30/23 1972. Diagnosis of diabetes mellitus age 2 1/2, living in [facility]  Initial treatment with NPH and Regular insulin medications  Had seen Dr. Sarah Sierra/ Endocrinology  Switched to MDI insulin plan using Nv and Levemir insulin     8/19/14. Initial consult with me at my former clinic ([facility])  8/20/14. CDE RD evaluation, also tried demo OmniPod pump  Used Novolog Echo 1/2U pen, with I:C carb counting method  Tried Lantus BID dosing, then Tresiba (expensive), then Basaglaru              Previously used Novolog insulin  5/2018. Switched to Fiasp 3/2018, then Fiasp non-formulary 2019  Meals typically brunch 10am and supper 9:30pm  Takes Prednisone for RA, current dose 3 mg QAM  Discussed hypoglycemia treatment options, jelly beans vs glucose tablets    Current DM meds:  Lantus Solostar                      13U each morning  Fiasp Flextouch                         Breakfast  1:13 (typically 6-7Uper meal)   Supper   1:12                                                  sscale 1U per 40>125                                                  target  mg/dl     Has InsulinCalculator smart phone merry, but not used  Using Accucheck Guide BG meter              Tests 4x/day              Does own I:C and ISF calculations  9/2019. She has worn a free sample LibrePersonal continuous glucose " sensor              Started Freestyle Mary CGM, then upgraded to Libre2 CGM with [place]    Recent Freestyle Libre2 data:          Previous [facility] labs include:  Lab Results   Component Value Date    A1C 7.9 (H) 09/22/2022     09/22/2022    POTASSIUM 3.7 09/22/2022    CHLORIDE 104 09/22/2022    CO2 30 09/22/2022    ANIONGAP 5 09/22/2022     (H) 09/22/2022    BUN 19 09/22/2022    CR 0.85 09/22/2022    GFRESTIMATED 82 09/22/2022    GFRESTBLACK 87 02/19/2021    FINESSE 9.0 09/22/2022    CHOL 150 02/23/2022    TRIG 89 02/23/2022    HDL 65 02/23/2022    LDL 67 02/23/2022    NHDL 85 02/23/2022    UCRR 391 02/23/2022    MICROL 32 02/23/2022    UMALCR 8.18 02/23/2022     Lab Results   Component Value Date    TSH 3.33 02/23/2022    T4 0.96 02/18/2011     No history of vascular disease.  Takes simvastatin for hyperlipidemia management.  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 [place], enjoys exercise classes  Sees Dr. Humberto Melendez/ [facility] for general medicine evaluations.  Also sees Dr. Blanco/[facility] rheumatology      PMH/PSH:  Past Medical History:   Diagnosis Date     Diabetic retinopathy associated with type 1 diabetes mellitus (H)     proliferative DR and bilateral laser PC surgeries     Hyperlipidemia LDL goal < 130      RA (rheumatoid arthritis) (H)     seeing rheumatologist.     Sjoegren syndrome     seeing rheumatologist.     Type 1 diabetes mellitus (H)      retinopathy, neuropathy     Visual impairment      Past Surgical History:   Procedure Laterality Date     [facility] NONSPECIFIC PROCEDURE  8/00/97    T & A     [facility] NONSPECIFIC PROCEDURE  5/00/99    L eye surgery     [facility] NONSPECIFIC PROCEDURE  4/10/01    R cataract + IOL     [facility] NONSPECIFIC PROCEDURE  2002    bilat vitrectomy     [facility] NONSPECIFIC PROCEDURE  7/04    R eye vitrectomy     [facility] NONSPECIFIC PROCEDURE  5/08    R vitrectomy & partial retinal detachment        Family Hx:  Family History   Problem Relation Age of Onset     Arthritis Mother         on celebrex,alive & healthy 2002     Breast Cancer Mother      Arthritis Father         on celebrex(2002)     Heart Disease Father         heart attack 1990, 60 years old (2002)     Diabetes Father      Cerebrovascular Disease Father         age 70     Thyroid Disease Sister         on thyroid med for couple of years , 35 yrs now(2002)     Cancer Paternal Grandmother         STOMACH     Cerebrovascular Disease Paternal Grandfather         age 90     Breast Cancer Cousin 55.00     Colorectal Cancer Cousin      Breast Cancer Other      Colorectal Cancer Other          Social Hx:  Social History     Socioeconomic History     Marital status: Single     Spouse name:      Number of children: 0     Years of education: Not on file     Highest education level: Not on file   Occupational History     Occupation: [employer]     Employer: [employer]   Tobacco Use     Smoking status: Never     Smokeless tobacco: Never   Substance and Sexual Activity     Alcohol use: Yes     Comment: 1 drink every 3 weeks     Drug use: No     Sexual activity: Not Currently     Partners: Male     Comment:    Other Topics Concern     Parent/sibling w/ CABG, MI or angioplasty before 65F 55M? Not Asked   Social History Narrative     Not on file     Social Determinants of Health     Financial Resource Strain: Not on file   Food Insecurity: Not on file   Transportation Needs: Not  "on file   Physical Activity: Not on file   Stress: Not on file   Social Connections: Not on file   Intimate Partner Violence: Not on file   Housing Stability: Not on file          MEDICATIONS:  has a current medication list which includes the following prescription(s): brimonidine, freestyle thea 2 sensor, restasis, diclofenac, dorzolamide-timolol, ferrous sulfate, folic acid, infliximab, insulin aspart, lantus solostar, latanoprost, losartan, methotrexate, fish oil triple strength, one daily multiple vitamin, prednisone, simvastatin, sulfasalazine, valacyclovir, blood glucose, blood glucose, blood glucose monitoring, blood glucose, continuous blood glucose monitoring, and bd adelfo u/f.        ROS: 10 point ROS neg other than the symptoms noted above in the HPI.     GENERAL: some fatigue; weight stable; denies fevers, chills, night sweats.   HEENT: minimal/no vision from right eye, dry eyes/mouth; no dysphagia, odonophagia, diplopia  THYROID:  no apparent hyper or hypothyroid symptoms  CV: no chest pain, pressure, palpitations  LUNGS: no SOB, FUENTES, cough, wheezing   ABDOMEN: rare morning nausea; no diarrhea, constipation, abdominal pain  EXTREMITIES: no rashes, ulcers, edema  NEUROLOGY: decreased sensation at feet; no headaches, denies changes in vision, tingling, extremitiy numbness   MSK: mild hand tendinitis pains; no muscle aches, weakness  SKIN: scalp hair thinning; denies rashes or lesions/foot sores  :  no menses since stopping OCP medication 1/2021  PSYCH:  stable mood, no significant anxiety or depression  ENDOCRINE: intermittent hot flashes    Physical Exam   VS: /74   Pulse 91   Ht 1.613 m (5' 3.5\")   Wt 53.1 kg (117 lb 1.6 oz)   BMI 20.42 kg/m     CONSTITUTIONAL: healthy, alert and NAD, well dressed, answering questions appropriately  ENT: no nose swelling or nasal discharge, mouth redness or gum changes.  EYES: eyes grossly normal to inspection, conjunctivae and sclerae normal, no " exophthalmos or proptosis  THYROID:  no apparent nodules or goiter  LUNGS: no audible wheeze, cough or visible cyanosis, no visible retractions or increased work of breathing  ABDOMEN: abdomen normal size  EXTREMITIES: slowed flexion with left middle finger; no hand tremors, limited exam  NEUROLOGY: CN grossly intact, mentation intact and speech normal   SKIN:  no apparent skin lesions, rash, or edema with visualized skin appearance  PSYCH: mentation appears normal, affect normal/bright, judgement and insight intact,   normal speech and appearance well groomed    LABS:     All pertinent notes, labs, and images personally reviewed by me.        A/P:  Encounter Diagnoses   Name Primary?     Type 1 diabetes mellitus with proliferative retinopathy, macular edema presence unspecified, unspecified laterality, unspecified proliferative retinopathy type (H) Yes     Type 1 diabetes mellitus with diabetic neuropathy (H)        Comments:  Reviewed the diabetes and health history  Recent SG trends indicate higher afternoon and evening trends  Reviewed and interpreted tests that I previously ordered.   Ordered appropriate tests for the endocrinology disease management.    Management options discussed and implemented after shared medical decision making with the patient.  T1DM problem is chronic-stable    Plan:  Discussed general issues with the diabetes diagnosis and managemen  We discussed the hgbA1c test which reflects previous overall glucose levels or control  Discussed the importance of blood glucose (BG) testing to assess glucose trends  Discussed use of the Accu-check Guide BG meter  Provided general overview of the multiple daily injection (MDI) plan using rapid acting mealtime               and longacting insulin medications  Reviewed recent Freestyle Libre2 CGM glucose sensor trend data, in detail      Recommend:  Continue current insulin treatment and diabetes management, with Fiasp mealtime & Lantus MDI plan  Lantus  Solostar                      12U each morning  Fiasp Flextouch                         Breakfast  1:12 (typically 6-7Uper meal)   Supper   1:12                                                  sscale 1U per 40>125                                                  target  mg/dl    Increase the work midday snack ([place] snack pack) + latte Fiasp insulin dose 3U to 4U.  Consider using the InsulinCalculator iPhone merry as insulin dose calculator, discussed  If premeal sensor glucose at low end of range, reduce the Fiasp dose by 1-2 units  Continue use of the Freestyle sensor   She is currently using Libre2 CGM with [place]   Consider upgrading the iPhone and then download Libre3 merry, change to Freestyle Libre3 CGM  Goal target premeal glucose 80- 150 mg/dl  We have discussed hypoglycemia management, use of glucose tabs or jelly bean snack  Needs repeat lab tests soon   Consider testing at PCP appt   Lab orders placed  Continue the losartan daily dose  Continue current simvastatin lipid medication   Advise having fasting lipid panel testing and dilated eye examination at least annually     Addressed patient questions today    There are no Patient Instructions on file for this visit.    Future labs ordered today:   Orders Placed This Encounter   Procedures     GLUCOSE MONITOR, 72 HOUR, PHYS INTERP     Hemoglobin A1c     Basic metabolic panel     Albumin Random Urine Quantitative with Creat Ratio     TSH with free T4 reflex     Radiology/Consults ordered today: None    Total time spent on day of encounter:  22 min    Follow-up:  4/27/23, Return    TI Lowe MD, MS  Endocrinology  [facility]    CC:  CHUY Melendez                      Again, thank you for allowing me to participate in the care of your patient.        Sincerely,        Saurabh Lowe MD

## 2023-02-07 ENCOUNTER — TRANSFERRED RECORDS (OUTPATIENT)
Dept: HEALTH INFORMATION MANAGEMENT | Facility: CLINIC | Age: 53
End: 2023-02-07

## 2023-02-07 LAB
ALT SERPL-CCNC: 22 IU/L (ref 5–35)
AST SERPL-CCNC: 28 U/L (ref 5–34)
CREATININE (EXTERNAL): 0.92 MG/DL (ref 0.5–1.3)

## 2023-02-17 DIAGNOSIS — E10.3599 TYPE 1 DIABETES MELLITUS WITH PROLIFERATIVE RETINOPATHY, MACULAR EDEMA PRESENCE UNSPECIFIED, UNSPECIFIED LATERALITY, UNSPECIFIED PROLIFERATIVE RETINOPATHY TYPE (H): ICD-10-CM

## 2023-02-17 DIAGNOSIS — E78.5 HYPERLIPIDEMIA LDL GOAL <100: ICD-10-CM

## 2023-02-17 DIAGNOSIS — I10 ESSENTIAL HYPERTENSION: ICD-10-CM

## 2023-02-20 RX ORDER — LOSARTAN POTASSIUM 25 MG/1
TABLET ORAL
Qty: 90 TABLET | Refills: 0 | Status: SHIPPED | OUTPATIENT
Start: 2023-02-20 | End: 2023-05-24

## 2023-02-20 RX ORDER — SIMVASTATIN 40 MG
TABLET ORAL
Qty: 90 TABLET | Refills: 0 | Status: SHIPPED | OUTPATIENT
Start: 2023-02-20 | End: 2023-05-24

## 2023-04-01 ENCOUNTER — HEALTH MAINTENANCE LETTER (OUTPATIENT)
Age: 53
End: 2023-04-01

## 2023-04-24 ENCOUNTER — TELEPHONE (OUTPATIENT)
Dept: ENDOCRINOLOGY | Facility: CLINIC | Age: 53
End: 2023-04-24

## 2023-04-24 NOTE — TELEPHONE ENCOUNTER
Message reviewed.  If we have a sample Libre2 CGM sensor, OK to offer it to patient if she is willing to stop by the clinic to pick it up.    TI Lowe MD, MS  Endocrinology  [facility]

## 2023-04-24 NOTE — TELEPHONE ENCOUNTER
[facility] Call Center    Phone Message    May a detailed message be left on voicemail: yes     Reason for Call: Other: Patient called.  She had an issue with her sensor.  it ended early and she is without a sensor.  She bumped it and it came out this morning.  She has a refill set for delivery tomorrow. Wants to know if we can give her one from the clinic today. Thank you.     Action Taken: Endo    Travel Screening: Not Applicable

## 2023-04-25 NOTE — TELEPHONE ENCOUNTER
Called pt and lm on self identified vm that RN will leave a Libre2 sensor at  if she still wants to come and  (even though she is expecting the delivery today). Chel Hall RN

## 2023-04-27 ENCOUNTER — OFFICE VISIT (OUTPATIENT)
Dept: ENDOCRINOLOGY | Facility: CLINIC | Age: 53
End: 2023-04-27
Payer: COMMERCIAL

## 2023-04-27 ENCOUNTER — LAB (OUTPATIENT)
Dept: LAB | Facility: CLINIC | Age: 53
End: 2023-04-27
Payer: COMMERCIAL

## 2023-04-27 VITALS
SYSTOLIC BLOOD PRESSURE: 151 MMHG | BODY MASS INDEX: 19.72 KG/M2 | HEART RATE: 81 BPM | WEIGHT: 115.5 LBS | HEIGHT: 64 IN | DIASTOLIC BLOOD PRESSURE: 83 MMHG

## 2023-04-27 DIAGNOSIS — E10.40 TYPE 1 DIABETES MELLITUS WITH DIABETIC NEUROPATHY (H): ICD-10-CM

## 2023-04-27 DIAGNOSIS — E10.3599 TYPE 1 DIABETES MELLITUS WITH PROLIFERATIVE RETINOPATHY, MACULAR EDEMA PRESENCE UNSPECIFIED, UNSPECIFIED LATERALITY, UNSPECIFIED PROLIFERATIVE RETINOPATHY TYPE (H): Primary | ICD-10-CM

## 2023-04-27 DIAGNOSIS — E10.3599 TYPE 1 DIABETES MELLITUS WITH PROLIFERATIVE RETINOPATHY, MACULAR EDEMA PRESENCE UNSPECIFIED, UNSPECIFIED LATERALITY, UNSPECIFIED PROLIFERATIVE RETINOPATHY TYPE (H): ICD-10-CM

## 2023-04-27 LAB — HBA1C MFR BLD: 7.3 % (ref 0–5.6)

## 2023-04-27 PROCEDURE — 99214 OFFICE O/P EST MOD 30 MIN: CPT | Performed by: INTERNAL MEDICINE

## 2023-04-27 PROCEDURE — 36415 COLL VENOUS BLD VENIPUNCTURE: CPT

## 2023-04-27 PROCEDURE — 82570 ASSAY OF URINE CREATININE: CPT

## 2023-04-27 PROCEDURE — 83036 HEMOGLOBIN GLYCOSYLATED A1C: CPT

## 2023-04-27 PROCEDURE — 80048 BASIC METABOLIC PNL TOTAL CA: CPT

## 2023-04-27 PROCEDURE — 95251 CONT GLUC MNTR ANALYSIS I&R: CPT | Performed by: INTERNAL MEDICINE

## 2023-04-27 PROCEDURE — 82043 UR ALBUMIN QUANTITATIVE: CPT

## 2023-04-27 PROCEDURE — 84443 ASSAY THYROID STIM HORMONE: CPT

## 2023-04-27 RX ORDER — INSULIN GLARGINE 100 [IU]/ML
11-13 INJECTION, SOLUTION SUBCUTANEOUS EVERY MORNING
Qty: 15 ML | Refills: 5 | Status: SHIPPED | OUTPATIENT
Start: 2023-04-27 | End: 2024-06-18

## 2023-04-27 RX ORDER — PEN NEEDLE, DIABETIC 32GX 5/32"
NEEDLE, DISPOSABLE MISCELLANEOUS
Qty: 400 EACH | Refills: 3 | Status: SHIPPED | OUTPATIENT
Start: 2023-04-27 | End: 2024-06-18

## 2023-04-27 NOTE — PROGRESS NOTES
Recent issues:  Diabetes follow-up evaluation  Taking the Fiasp and Lantus insulin plan, also using the Freestyle Libre2 CGM sensor  Continues taking the Remicaid and (same) low dose Prednisone 3 mg daily          1972. Diagnosis of diabetes mellitus age 2 1/2, living in [facility]  Initial treatment with NPH and Regular insulin medications  Had seen Dr. Sarah Sierra/[facility]  Switched to MDI insulin plan using Nv and Levemir insulin     8/19/14. Initial consult with me at my former clinic ([facility])  8/20/14. CDE RD evaluation, also tried demo OmniPod pump  Used Novolog Echo 1/2U pen, with I:C carb counting method  Tried Lantus BID dosing, then Tresiba (expensive), then Basaglaru              Previously used Novolog insulin  5/2018. Switched to Fiasp 3/2018, then Fiasp non-formulary 2019  Meals typically brunch 10am and supper 9:30pm  Takes Prednisone for RA, current dose 3 mg QAM  Discussed hypoglycemia treatment options, jelly beans vs glucose tablets    Current DM meds:  Lantus Solostar                      11U each morning  Fiasp Flextouch                         Breakfast  1:12 (typically 6-7Uper meal)   Supper   1:12                                                  sscale 1U per 40>125                                                  target  mg/dl    Frequently takes Fiasp postmeal    Has InsulinCalculator smart phone merry, but not used  Using Accucheck Guide BG meter              Has tested up to 4x/day              Does own I:C and ISF calculations  9/2019. She has worn a free sample LibrePersonal continuous glucose sensor              Started Freestyle Mary CGM, then upgraded to Libre2 CGM with [place]    Recent Freestyle Libre2 data:          Previous  labs include:  Lab Results   Component Value Date    A1C 7.3 (H) 04/27/2023     09/22/2022    POTASSIUM 3.7 09/22/2022    CHLORIDE 104 09/22/2022    CO2 30 09/22/2022    ANIONGAP 5 09/22/2022     (H) 09/22/2022    BUN 19  09/22/2022    CR 0.85 09/22/2022    GFRESTIMATED 82 09/22/2022    GFRESTBLACK 87 02/19/2021    FINESSE 9.0 09/22/2022    CHOL 150 02/23/2022    TRIG 89 02/23/2022    HDL 65 02/23/2022    LDL 67 02/23/2022    NHDL 85 02/23/2022    UCRR 391 02/23/2022    MICROL 32 02/23/2022    UMALCR 8.18 02/23/2022     Lab Results   Component Value Date    TSH 3.33 02/23/2022    T4 0.96 02/18/2011     No history of vascular disease.  Takes simvastatin for hyperlipidemia management.  DM Complications:              Retinopathy                          Previous proliferative DR and bilateral laser PC surgeries                          Had cataracts, retinal detachment repair OD, higher occular pressures                          Significant vision loss, needs print magnification                          Sees Fer Davison and LUCY Ahmadi/ophthalmology              Neuropathy:                          Decreased sensation in feet          Single, lives in [place], [place]; works at [employer] job at [employer]  Exercises at [employer], enjoys exercise classes  Sees Dr. Humberto Melendez/[facility] for general medicine evaluations.  Also sees Dr. Blanco/[facility] rheumatology      PMH/PSH:  Past Medical History:   Diagnosis Date     Diabetic retinopathy associated with type 1 diabetes mellitus (H)     proliferative DR and bilateral laser PC surgeries     Hyperlipidemia LDL goal < 130      RA (rheumatoid arthritis) (H)     seeing rheumatologist.     Sjoegren syndrome     seeing rheumatologist.     Type 1 diabetes mellitus (H)     retinopathy, neuropathy     Visual impairment      Past Surgical History:   Procedure Laterality Date     [facility] NONSPECIFIC PROCEDURE  8/00/97    T & A     [facility] NONSPECIFIC PROCEDURE  5/00/99    L eye surgery     [facility] NONSPECIFIC PROCEDURE  4/10/01    R cataract + IOL     [facility] NONSPECIFIC PROCEDURE  2002    bilat vitrectomy     [facility] NONSPECIFIC PROCEDURE  7/04    R eye vitrectomy     [facility]  NONSPECIFIC PROCEDURE  5/08    R vitrectomy & partial retinal detachment        Family Hx:  Family History   Problem Relation Age of Onset     Arthritis Mother         on celebrex,alive & healthy 2002     Breast Cancer Mother      Arthritis Father         on celebrex(2002)     Heart Disease Father         heart attack 1990, 60 years old (2002)     Diabetes Father      Cerebrovascular Disease Father         age 70     Thyroid Disease Sister         on thyroid med for couple of years , 35 yrs now(2002)     Cancer Paternal Grandmother         STOMACH     Cerebrovascular Disease Paternal Grandfather         age 90     Breast Cancer Cousin 55.00     Colorectal Cancer Cousin      Breast Cancer Other      Colorectal Cancer Other          Social Hx:  Social History     Socioeconomic History     Marital status: Single     Spouse name:      Number of children: 0     Years of education: Not on file     Highest education level: Not on file   Occupational History     Occupation: [employer]     Employer: [employer]   Tobacco Use     Smoking status: Never     Smokeless tobacco: Never   Vaping Use     Vaping status: Not on file   Substance and Sexual Activity     Alcohol use: Yes     Comment: 1 drink every 3 weeks     Drug use: No     Sexual activity: Not Currently     Partners: Male     Comment:    Other Topics Concern     Parent/sibling w/ CABG, MI or angioplasty before 65F 55M? Not Asked   Social History Narrative     Not on file     Social Determinants of Health     Financial Resource Strain: Not on file   Food Insecurity: Not on file   Transportation Needs: Not on file   Physical Activity: Not on file   Stress: Not on file   Social Connections: Not on file   Intimate Partner Violence: Not on file   Housing Stability: Not on file          MEDICATIONS:  has a current medication list which includes the following prescription(s): brimonidine, freestyle thea 2 sensor, restasis, diclofenac,  "dorzolamide-timolol, ferrous sulfate, folic acid, infliximab, insulin aspart, lantus solostar, bd adelof u/f, latanoprost, losartan, methotrexate, fish oil triple strength, one daily multiple vitamin, prednisone, simvastatin, sulfasalazine, valacyclovir, blood glucose, blood glucose, blood glucose monitoring, blood glucose, and continuous blood glucose monitoring.        ROS: 10 point ROS neg other than the symptoms noted above in the HPI.     GENERAL: some fatigue; weight stable; denies fevers, chills, night sweats.   HEENT: minimal/no vision from right eye, dry eyes/mouth; no dysphagia, odonophagia, diplopia  THYROID:  no apparent hyper or hypothyroid symptoms  CV: no chest pain, pressure, palpitations  LUNGS: no SOB, FUENTES, cough, wheezing   ABDOMEN: rare morning nausea; no diarrhea, constipation, abdominal pain  EXTREMITIES: no rashes, ulcers, edema  NEUROLOGY: decreased sensation at feet; no headaches, denies changes in vision, tingling, extremitiy numbness   MSK: mild hand tendinitis pains; no muscle aches, weakness  SKIN: scalp hair thinning; denies rashes or lesions/foot sores  :  no menses since stopping OCP medication 1/2021  PSYCH:  stable mood, no significant anxiety or depression  ENDOCRINE: intermittent hot flashes    Physical Exam   VS: BP (!) 151/83   Pulse 81   Ht 1.613 m (5' 3.5\")   Wt 52.4 kg (115 lb 8 oz)   BMI 20.14 kg/m     CONSTITUTIONAL: healthy, alert and NAD, well dressed, answering questions appropriately  ENT: no nose swelling or nasal discharge, mouth redness or gum changes.  EYES: eyes grossly normal to inspection, conjunctivae and sclerae normal, no exophthalmos or proptosis  THYROID:  no apparent nodules or goiter  LUNGS: no audible wheeze, cough or visible cyanosis, no visible retractions or increased work of breathing  ABDOMEN: abdomen normal size  EXTREMITIES: slowed flexion with left middle finger; no hand tremors, limited exam  NEUROLOGY: CN grossly intact, mentation intact and " speech normal   SKIN:  no apparent skin lesions, rash, or edema with visualized skin appearance  PSYCH: mentation appears normal, affect normal/bright, judgement and insight intact,   normal speech and appearance well groomed    LABS:     All pertinent notes, labs, and images personally reviewed by me.        A/P:  Encounter Diagnoses   Name Primary?     Type 1 diabetes mellitus with proliferative retinopathy, macular edema presence unspecified, unspecified laterality, unspecified proliferative retinopathy type (H) Yes     Type 1 diabetes mellitus with diabetic neuropathy (H)        Comments:  Reviewed the diabetes and health history  Recent SG trends indicate higher afternoon and evening trends  Reviewed and interpreted tests that I previously ordered.   Ordered appropriate tests for the endocrinology disease management.    Management options discussed and implemented after shared medical decision making with the patient.  T1DM problem is chronic-stable    Plan:  Discussed general issues with the diabetes diagnosis and managemen  We discussed the hgbA1c test which reflects previous overall glucose levels or control  Discussed the importance of blood glucose (BG) testing to assess glucose trends  Discussed use of the Accu-check Guide BG meter  Provided general overview of the multiple daily injection (MDI) plan using rapid acting mealtime               and longacting insulin medications  Reviewed recent Freestyle Libre2 CGM glucose sensor trend data, in detail      Recommend:  Continue current Fiasp mealtime & Lantus insulin MDI plan  Lantus Solostar                      11U each morning  Fiasp Flextouch                         Breakfast  1:14 (typically 6-7Uper meal)   Supper   1:12                                                  sscale 1U per 40>125                                                  target  mg/dl    Change dosing routine for meals with predictable quantity (Such as morning muffin or breakfast  sandwich)   Dose Fiasp premeal  Consider using the InsulinCalculator iPhone merry as insulin dose calculator, discussed  If premeal sensor glucose at low end of range, reduce the Fiasp dose by 1-2 units  Continue use of the Freestyle sensor   She is currently using Libre2 CGM with [place]   Consider upgrading the iPhone and then download Libre3 merry, change to Freestyle Libre3 CGM  Goal target premeal glucose 80- 150 mg/dl  We have discussed hypoglycemia management, use of glucose tabs or jelly bean snack  Needs repeat lab tests soon   Consider testing at PCP appt   Lab orders placed  Continue the losartan daily dose  Continue current simvastatin lipid medication   Advise having fasting lipid panel testing and dilated eye examination at least annually     Addressed patient questions today    There are no Patient Instructions on file for this visit.    Future labs ordered today:   Orders Placed This Encounter   Procedures     GLUCOSE MONITOR, 72 HOUR, PHYS INTERP     Hemoglobin A1c     Basic metabolic panel     ALT     Albumin Random Urine Quantitative with Creat Ratio     TSH     Radiology/Consults ordered today: None    Total time spent on day of encounter:  35 min    Follow-up:  8/14/23 at 11:30 am    TI Lowe MD, MS  Endocrinology  [facility]    CC:  CHUY Melendez

## 2023-04-27 NOTE — LETTER
4/27/2023         RE: Perlita Chavez  [address]  [place] [facility] [address]        Dear Colleague,    Thank you for referring your patient, Perlita Chavez, to the [facility] [place]. Please see a copy of my visit note below.    Recent issues:  Diabetes follow-up evaluation  Taking the Fiasp and Lantus insulin plan, also using the Freestyle Libre2 CGM sensor  Continues taking the Remicaid and (same) low dose Prednisone 3 mg daily          1972. Diagnosis of diabetes mellitus age 2 1/2, living in [facility]  Initial treatment with NPH and Regular insulin medications  Had seen Dr. Sarah Sierra/ Endocrinology  Switched to MDI insulin plan using Nv and Levemir insulin     8/19/14. Initial consult with me at my former clinic ([facility])  8/20/14. CDE RD evaluation, also tried demo OmniPod pump  Used Novolog Echo 1/2U pen, with I:C carb counting method  Tried Lantus BID dosing, then Tresiba (expensive), then Basaglaru              Previously used Novolog insulin  5/2018. Switched to Fiasp 3/2018, then Fiasp non-formulary 2019  Meals typically brunch 10am and supper 9:30pm  Takes Prednisone for RA, current dose 3 mg QAM  Discussed hypoglycemia treatment options, jelly beans vs glucose tablets    Current DM meds:  Lantus Solostar                      11U each morning  Fiasp Flextouch                         Breakfast  1:12 (typically 6-7Uper meal)   Supper   1:12                                                  sscale 1U per 40>125                                                  target  mg/dl    Frequently takes Fiasp postmeal    Has InsulinCalculator smart phone merry, but not used  Using Accucheck Guide BG meter              Has tested up to 4x/day              Does own I:C and ISF calculations  9/2019. She has worn a free sample LibrePersonal continuous glucose sensor              Started Freestyle Mary CGM, then upgraded to Libre2 CGM with [place]    Recent Freestyle Libre2  data:          Previous  labs include:  Lab Results   Component Value Date    A1C 7.3 (H) 04/27/2023     09/22/2022    POTASSIUM 3.7 09/22/2022    CHLORIDE 104 09/22/2022    CO2 30 09/22/2022    ANIONGAP 5 09/22/2022     (H) 09/22/2022    BUN 19 09/22/2022    CR 0.85 09/22/2022    GFRESTIMATED 82 09/22/2022    GFRESTBLACK 87 02/19/2021    FINESSE 9.0 09/22/2022    CHOL 150 02/23/2022    TRIG 89 02/23/2022    HDL 65 02/23/2022    LDL 67 02/23/2022    NHDL 85 02/23/2022    UCRR 391 02/23/2022    MICROL 32 02/23/2022    UMALCR 8.18 02/23/2022     Lab Results   Component Value Date    TSH 3.33 02/23/2022    T4 0.96 02/18/2011     No history of vascular disease.  Takes simvastatin for hyperlipidemia management.  DM Complications:              Retinopathy                          Previous proliferative DR and bilateral laser PC surgeries                          Had cataracts, retinal detachment repair OD, higher occular pressures                          Significant vision loss, needs print magnification                          Sees Fer Davison and LUCY Ahmadi/ophthalmology              Neuropathy:                          Decreased sensation in feet          Single, lives in [place], [place]; works at retail job at [employer]  Exercises at [employer], enjoys exercise classes  Sees Dr. Humberto Melendez/ [facility] for general medicine evaluations.  Also sees Dr. Blanco/[facility] rheumatology      PMH/PSH:  Past Medical History:   Diagnosis Date     Diabetic retinopathy associated with type 1 diabetes mellitus (H)     proliferative DR and bilateral laser PC surgeries     Hyperlipidemia LDL goal < 130      RA (rheumatoid arthritis) (H)     seeing rheumatologist.     Sjoegren syndrome     seeing rheumatologist.     Type 1 diabetes mellitus (H)     retinopathy, neuropathy     Visual impairment      Past Surgical History:   Procedure Laterality Date     [facility] NONSPECIFIC  PROCEDURE  8/00/97    T & A     [facility] NONSPECIFIC PROCEDURE  5/00/99    L eye surgery     [facility] NONSPECIFIC PROCEDURE  4/10/01    R cataract + IOL     [facility] NONSPECIFIC PROCEDURE  2002    bilat vitrectomy     [facility] NONSPECIFIC PROCEDURE  7/04    R eye vitrectomy     [facility] NONSPECIFIC PROCEDURE  5/08    R vitrectomy & partial retinal detachment        Family Hx:  Family History   Problem Relation Age of Onset     Arthritis Mother         on celebrex,alive & healthy 2002     Breast Cancer Mother      Arthritis Father         on celebrex(2002)     Heart Disease Father         heart attack 1990, 60 years old (2002)     Diabetes Father      Cerebrovascular Disease Father         age 70     Thyroid Disease Sister         on thyroid med for couple of years , 35 yrs now(2002)     Cancer Paternal Grandmother         STOMACH     Cerebrovascular Disease Paternal Grandfather         age 90     Breast Cancer Cousin 55.00     Colorectal Cancer Cousin      Breast Cancer Other      Colorectal Cancer Other          Social Hx:  Social History     Socioeconomic History     Marital status: Single     Spouse name:      Number of children: 0     Years of education: Not on file     Highest education level: Not on file   Occupational History     Occupation: [employer]     Employer: [employer]   Tobacco Use     Smoking status: Never     Smokeless tobacco: Never   Vaping Use     Vaping status: Not on file   Substance and Sexual Activity     Alcohol use: Yes     Comment: 1 drink every 3 weeks     Drug use: No     Sexual activity: Not Currently     Partners: Male     Comment:    Other Topics Concern     Parent/sibling w/ CABG, MI or angioplasty before 65F 55M? Not Asked   Social History Narrative     Not on file     Social Determinants of Health     Financial Resource Strain: Not on file   Food Insecurity: Not on file   Transportation Needs: Not on file   Physical Activity: Not on file   Stress: Not on file   Social  "Connections: Not on file   Intimate Partner Violence: Not on file   Housing Stability: Not on file          MEDICATIONS:  has a current medication list which includes the following prescription(s): brimonidine, freestyle thea 2 sensor, restasis, diclofenac, dorzolamide-timolol, ferrous sulfate, folic acid, infliximab, insulin aspart, lantus solostar, bd adelfo u/f, latanoprost, losartan, methotrexate, fish oil triple strength, one daily multiple vitamin, prednisone, simvastatin, sulfasalazine, valacyclovir, blood glucose, blood glucose, blood glucose monitoring, blood glucose, and continuous blood glucose monitoring.        ROS: 10 point ROS neg other than the symptoms noted above in the HPI.     GENERAL: some fatigue; weight stable; denies fevers, chills, night sweats.   HEENT: minimal/no vision from right eye, dry eyes/mouth; no dysphagia, odonophagia, diplopia  THYROID:  no apparent hyper or hypothyroid symptoms  CV: no chest pain, pressure, palpitations  LUNGS: no SOB, FUENTES, cough, wheezing   ABDOMEN: rare morning nausea; no diarrhea, constipation, abdominal pain  EXTREMITIES: no rashes, ulcers, edema  NEUROLOGY: decreased sensation at feet; no headaches, denies changes in vision, tingling, extremitiy numbness   MSK: mild hand tendinitis pains; no muscle aches, weakness  SKIN: scalp hair thinning; denies rashes or lesions/foot sores  :  no menses since stopping OCP medication 1/2021  PSYCH:  stable mood, no significant anxiety or depression  ENDOCRINE: intermittent hot flashes    Physical Exam   VS: BP (!) 151/83   Pulse 81   Ht 1.613 m (5' 3.5\")   Wt 52.4 kg (115 lb 8 oz)   BMI 20.14 kg/m     CONSTITUTIONAL: healthy, alert and NAD, well dressed, answering questions appropriately  ENT: no nose swelling or nasal discharge, mouth redness or gum changes.  EYES: eyes grossly normal to inspection, conjunctivae and sclerae normal, no exophthalmos or proptosis  THYROID:  no apparent nodules or goiter  LUNGS: no " audible wheeze, cough or visible cyanosis, no visible retractions or increased work of breathing  ABDOMEN: abdomen normal size  EXTREMITIES: slowed flexion with left middle finger; no hand tremors, limited exam  NEUROLOGY: CN grossly intact, mentation intact and speech normal   SKIN:  no apparent skin lesions, rash, or edema with visualized skin appearance  PSYCH: mentation appears normal, affect normal/bright, judgement and insight intact,   normal speech and appearance well groomed    LABS:     All pertinent notes, labs, and images personally reviewed by me.        A/P:  Encounter Diagnoses   Name Primary?     Type 1 diabetes mellitus with proliferative retinopathy, macular edema presence unspecified, unspecified laterality, unspecified proliferative retinopathy type (H) Yes     Type 1 diabetes mellitus with diabetic neuropathy (H)        Comments:  Reviewed the diabetes and health history  Recent SG trends indicate higher afternoon and evening trends  Reviewed and interpreted tests that I previously ordered.   Ordered appropriate tests for the endocrinology disease management.    Management options discussed and implemented after shared medical decision making with the patient.  T1DM problem is chronic-stable    Plan:  Discussed general issues with the diabetes diagnosis and managemen  We discussed the hgbA1c test which reflects previous overall glucose levels or control  Discussed the importance of blood glucose (BG) testing to assess glucose trends  Discussed use of the Accu-check Guide BG meter  Provided general overview of the multiple daily injection (MDI) plan using rapid acting mealtime               and longacting insulin medications  Reviewed recent Freestyle Libre2 CGM glucose sensor trend data, in detail      Recommend:  Continue current Fiasp mealtime & Lantus insulin MDI plan  Lantus Solostar                      11U each morning  Fiasp Flextouch                         Breakfast  1:14 (typically  6-7Uper meal)   Supper   1:12                                                  sscale 1U per 40>125                                                  target  mg/dl    Change dosing routine for meals with predictable quantity (Such as morning muffin or breakfast sandwich)   Dose Fiasp premeal  Consider using the InsulinCalculator [employer] merry as insulin dose calculator, discussed  If premeal sensor glucose at low end of range, reduce the Fiasp dose by 1-2 units  Continue use of the Freestyle sensor   She is currently using Libre2 CGM with [place]   Consider upgrading the iPhone and then download Libre3 merry, change to Freestyle Libre3 CGM  Goal target premeal glucose 80- 150 mg/dl  We have discussed hypoglycemia management, use of glucose tabs or jelly bean snack  Needs repeat lab tests soon   Consider testing at PCP appt   Lab orders placed  Continue the losartan daily dose  Continue current simvastatin lipid medication   Advise having fasting lipid panel testing and dilated eye examination at least annually     Addressed patient questions today    There are no Patient Instructions on file for this visit.    Future labs ordered today:   Orders Placed This Encounter   Procedures     GLUCOSE MONITOR, 72 HOUR, PHYS INTERP     Hemoglobin A1c     Basic metabolic panel     ALT     Albumin Random Urine Quantitative with Creat Ratio     TSH     Radiology/Consults ordered today: None    Total time spent on day of encounter:  35 min    Follow-up:  8/14/23 at 11:30 am    TI Lowe MD, MS  Endocrinology  [facility]    CC:  CHUY Melendez                      Again, thank you for allowing me to participate in the care of your patient.        Sincerely,        Saurabh Lowe MD

## 2023-04-28 LAB
ANION GAP SERPL CALCULATED.3IONS-SCNC: 10 MMOL/L (ref 7–15)
BUN SERPL-MCNC: 19.9 MG/DL (ref 6–20)
CALCIUM SERPL-MCNC: 9.7 MG/DL (ref 8.6–10)
CHLORIDE SERPL-SCNC: 103 MMOL/L (ref 98–107)
CREAT SERPL-MCNC: 0.95 MG/DL (ref 0.51–0.95)
CREAT UR-MCNC: 339 MG/DL
DEPRECATED HCO3 PLAS-SCNC: 29 MMOL/L (ref 22–29)
GFR SERPL CREATININE-BSD FRML MDRD: 72 ML/MIN/1.73M2
GLUCOSE SERPL-MCNC: 103 MG/DL (ref 70–99)
MICROALBUMIN UR-MCNC: 51.2 MG/L
MICROALBUMIN/CREAT UR: 15.1 MG/G CR (ref 0–25)
POTASSIUM SERPL-SCNC: 4.4 MMOL/L (ref 3.4–5.3)
SODIUM SERPL-SCNC: 142 MMOL/L (ref 136–145)
TSH SERPL DL<=0.005 MIU/L-ACNC: 3.37 UIU/ML (ref 0.3–4.2)

## 2023-05-23 ENCOUNTER — TRANSFERRED RECORDS (OUTPATIENT)
Dept: HEALTH INFORMATION MANAGEMENT | Facility: CLINIC | Age: 53
End: 2023-05-23
Payer: COMMERCIAL

## 2023-05-23 LAB — RETINOPATHY: NEGATIVE

## 2023-06-01 ENCOUNTER — HEALTH MAINTENANCE LETTER (OUTPATIENT)
Age: 53
End: 2023-06-01

## 2023-06-13 ENCOUNTER — TRANSFERRED RECORDS (OUTPATIENT)
Dept: HEALTH INFORMATION MANAGEMENT | Facility: CLINIC | Age: 53
End: 2023-06-13
Payer: COMMERCIAL

## 2023-06-13 LAB
ALT SERPL-CCNC: 21 IU/L (ref 5–35)
AST SERPL-CCNC: 28 U/L (ref 5–34)
CREATININE (EXTERNAL): 0.93 MG/DL (ref 0.5–1.3)
GFR ESTIMATED (EXTERNAL): 67.3 ML/MIN/1.73M2

## 2023-06-16 DIAGNOSIS — E10.3599 TYPE 1 DIABETES MELLITUS WITH PROLIFERATIVE RETINOPATHY, MACULAR EDEMA PRESENCE UNSPECIFIED, UNSPECIFIED LATERALITY, UNSPECIFIED PROLIFERATIVE RETINOPATHY TYPE (H): ICD-10-CM

## 2023-06-19 NOTE — TELEPHONE ENCOUNTER
Requested Prescriptions   Pending Prescriptions Disp Refills     Continuous Blood Gluc Sensor (FREESTYLE [place] 2 SENSOR) MISC [Pharmacy Med Name: FREESTYLE [place] 2 SENSOR  MISC]  11     Sig: CHANGE EVERY 14 DAYS.       There is no refill protocol information for this order        Last Written Prescription Date:  7/12/22  Last Fill Quantity: 2,  # refills: 11   Last office visit: 4/27/2023 ; last virtual visit: Visit date not found with prescribing provider:  Dr Lowe   Future Office Visit:  8/14/23    Refill Sent  Gregg Parker RN

## 2023-08-14 ENCOUNTER — OFFICE VISIT (OUTPATIENT)
Dept: ENDOCRINOLOGY | Facility: CLINIC | Age: 53
End: 2023-08-14
Payer: COMMERCIAL

## 2023-08-14 VITALS
HEIGHT: 64 IN | SYSTOLIC BLOOD PRESSURE: 170 MMHG | WEIGHT: 117.7 LBS | DIASTOLIC BLOOD PRESSURE: 75 MMHG | HEART RATE: 82 BPM | BODY MASS INDEX: 20.09 KG/M2

## 2023-08-14 DIAGNOSIS — E10.40 TYPE 1 DIABETES MELLITUS WITH DIABETIC NEUROPATHY (H): ICD-10-CM

## 2023-08-14 DIAGNOSIS — E10.3599 TYPE 1 DIABETES MELLITUS WITH PROLIFERATIVE RETINOPATHY, MACULAR EDEMA PRESENCE UNSPECIFIED, UNSPECIFIED LATERALITY, UNSPECIFIED PROLIFERATIVE RETINOPATHY TYPE (H): Primary | ICD-10-CM

## 2023-08-14 PROCEDURE — 95251 CONT GLUC MNTR ANALYSIS I&R: CPT | Performed by: INTERNAL MEDICINE

## 2023-08-14 PROCEDURE — 99214 OFFICE O/P EST MOD 30 MIN: CPT | Performed by: INTERNAL MEDICINE

## 2023-08-14 NOTE — PROGRESS NOTES
Recent issues:  Diabetes follow-up evaluation  Taking the Fiasp and Lantus insulin plan, also using the Freestyle Libre2 CGM sensor  Has seen an ophthalmology cornea specialist, told of dx dry eyes... now using lubricating eye drops & gel (preservative free)  Continues taking the Remicaid and (same) low dose Prednisone 3 mg daily          1972. Diagnosis of diabetes mellitus age 2 1/2, living in [facility]  Initial treatment with NPH and Regular insulin medications  Had seen Dr. Sarah Sierra/[facility]  Switched to MDI insulin plan using Nv and Levemir insulin     8/19/14. Initial consult with me at my former clinic ([facility])  8/20/14. CDE RD evaluation, also tried demo OmniPod pump  Used Novolog Echo 1/2U pen, with I:C carb counting method  Tried Lantus BID dosing, then Tresiba (expensive), then Basaglaru              Previously used Novolog insulin  5/2018. Switched to Fiasp 3/2018, then Fiasp non-formulary 2019  Meals typically brunch 10am and supper 9:30pm  Takes Prednisone for RA, current dose 3 mg QAM  Discussed hypoglycemia treatment options, jelly beans vs glucose tablets    Current DM meds:  Lantus Solostar                      12U each morning  Fiasp Flextouch                         Breakfast  1:12 (typically 6-7Uper meal)   Supper   1:12                                                  sscale 1U per 40>125                                                  target  mg/dl    Frequently takes Fiasp postmeal    Has InsulinCalculator smart phone merry, but not used  Using Accucheck Guide BG meter              Has tested up to 4x/day              Does own I:C and ISF calculations  9/2019. She has worn a free sample LibrePersonal continuous glucose sensor              Started Freestyle Mary CGM, then upgraded to Libre2 CGM with [place]    Recent Freestyle Libre2 data:          Previous [facility] labs include:  Lab Results   Component Value Date    A1C 7.3 (H) 04/27/2023     04/27/2023    POTASSIUM  4.4 04/27/2023    CHLORIDE 103 04/27/2023    CO2 29 04/27/2023    ANIONGAP 10 04/27/2023     (H) 04/27/2023    BUN 19.9 04/27/2023    CR 0.95 04/27/2023    GFRESTIMATED 72 04/27/2023    GFRESTBLACK 87 02/19/2021    FINESSE 9.7 04/27/2023    CHOL 150 02/23/2022    TRIG 89 02/23/2022    HDL 65 02/23/2022    LDL 67 02/23/2022    NHDL 85 02/23/2022    UCRR 339.0 04/27/2023    MICROL 51.2 04/27/2023    UMALCR 15.10 04/27/2023     Lab Results   Component Value Date    TSH 3.37 04/27/2023    T4 0.96 02/18/2011     No history of vascular disease.  Takes simvastatin for hyperlipidemia management.  DM Complications:              Retinopathy                          Previous proliferative DR and bilateral laser PC surgeries                          Had cataracts, retinal detachment repair OD, higher occular pressures                          Significant vision loss, needs print magnification                          Sees Fer Davison and LUCY Ahmadi/ophthalmology              Neuropathy:                          Decreased sensation in feet          Single, lives in [place], [place]; works at retail job at [employer]  Exercises at [employer] [facility], enjoys exercise classes  Sees Dr. Humberto Melendez/[facility] for general medicine evaluations.  Also sees Dr. Blanco/[facility] rheumatology      PMH/PSH:  Past Medical History:   Diagnosis Date    Diabetic retinopathy associated with type 1 diabetes mellitus (H)     proliferative DR and bilateral laser PC surgeries    Dry eyes     Hyperlipidemia LDL goal < 130     RA (rheumatoid arthritis) (H)     seeing rheumatologist.    Sjoegren syndrome     seeing rheumatologist.    Type 1 diabetes mellitus (H)     retinopathy, neuropathy    Visual impairment      Past Surgical History:   Procedure Laterality Date    [facility] NONSPECIFIC PROCEDURE  8/00/97    T & A    Z NONSPECIFIC PROCEDURE  5/00/99    L eye surgery    [facility] NONSPECIFIC PROCEDURE  4/10/01    R  cataract + IOL    [facility] NONSPECIFIC PROCEDURE  2002    bilat vitrectomy    [facility] NONSPECIFIC PROCEDURE  7/04    R eye vitrectomy    [facility] NONSPECIFIC PROCEDURE  5/08    R vitrectomy & partial retinal detachment        Family Hx:  Family History   Problem Relation Age of Onset    Arthritis Mother         on celebrex,alive & healthy 2002    Breast Cancer Mother     Arthritis Father         on celebrex(2002)    Heart Disease Father         heart attack 1990, 60 years old (2002)    Diabetes Father     Cerebrovascular Disease Father         age 70    Thyroid Disease Sister         on thyroid med for couple of years , 35 yrs now(2002)    Cancer Paternal Grandmother         STOMACH    Cerebrovascular Disease Paternal Grandfather         age 90    Breast Cancer Cousin 55.00    Colorectal Cancer Cousin     Breast Cancer Other     Colorectal Cancer Other          Social Hx:  Social History     Socioeconomic History    Marital status: Single     Spouse name:     Number of children: 0    Years of education: Not on file    Highest education level: Not on file   Occupational History    Occupation: [employer]     Employer: [employer]   Tobacco Use    Smoking status: Never    Smokeless tobacco: Never   Substance and Sexual Activity    Alcohol use: Yes     Comment: 1 drink every 3 weeks    Drug use: No    Sexual activity: Not Currently     Partners: Male     Comment:    Other Topics Concern    Parent/sibling w/ CABG, MI or angioplasty before 65F 55M? Not Asked   Social History Narrative    Not on file     Social Determinants of Health     Financial Resource Strain: Not on file   Food Insecurity: Not on file   Transportation Needs: Not on file   Physical Activity: Not on file   Stress: Not on file   Social Connections: Not on file   Intimate Partner Violence: Not on file   Housing Stability: Not on file          MEDICATIONS:  has a current medication list which includes the following prescription(s):  "brimonidine, freestyle thea 2 sensor, continuous blood glucose monitoring, restasis, dorzolamide-timolol, ferrous sulfate, folic acid, infliximab, insulin aspart, lantus solostar, latanoprost, losartan, methotrexate, fish oil triple strength, one daily multiple vitamin, prednisone, simvastatin, sulfasalazine, valacyclovir, blood glucose, blood glucose, blood glucose monitoring, blood glucose, diclofenac, and bd adelfo u/f.        ROS: 10 point ROS neg other than the symptoms noted above in the HPI.     GENERAL: some fatigue; weight stable; denies fevers, chills, night sweats.   HEENT: minimal/no vision from right eye, dry eyes/mouth; no dysphagia, odonophagia, diplopia  THYROID:  no apparent hyper or hypothyroid symptoms  CV: no chest pain, pressure, palpitations  LUNGS: no SOB, FUENTES, cough, wheezing   ABDOMEN: rare morning nausea; no diarrhea, constipation, abdominal pain  EXTREMITIES: no rashes, ulcers, edema  NEUROLOGY: decreased sensation at feet; no headaches, denies changes in vision, tingling, extremitiy numbness   MSK: mild hand tendinitis pains; no muscle aches, weakness  SKIN: scalp hair thinning; denies rashes or lesions/foot sores  :  no menses since stopping OCP medication 1/2021  PSYCH:  stable mood, no significant anxiety or depression  ENDOCRINE: intermittent hot flashes    Physical Exam   VS: BP (!) 170/75   Pulse 82   Ht 1.613 m (5' 3.5\")   Wt 53.4 kg (117 lb 11.2 oz)   BMI 20.52 kg/m     CONSTITUTIONAL: healthy, alert and NAD, well dressed, answering questions appropriately  ENT: no nose swelling or nasal discharge, mouth redness or gum changes.  EYES: eyes grossly normal to inspection, conjunctivae and sclerae normal, no exophthalmos or proptosis  THYROID:  no apparent nodules or goiter  LUNGS: no audible wheeze, cough or visible cyanosis, no visible retractions or increased work of breathing  ABDOMEN: abdomen normal size  EXTREMITIES: slowed flexion with left middle finger; no hand tremors, " limited exam  NEUROLOGY: CN grossly intact, mentation intact and speech normal   SKIN:  no apparent skin lesions, rash, or edema with visualized skin appearance  PSYCH: mentation appears normal, affect normal/bright, judgement and insight intact,   normal speech and appearance well groomed    LABS:     All pertinent notes, labs, and images personally reviewed by me.        A/P:  Encounter Diagnoses   Name Primary?    Type 1 diabetes mellitus with proliferative retinopathy, macular edema presence unspecified, unspecified laterality, unspecified proliferative retinopathy type (H) Yes    Type 1 diabetes mellitus with diabetic neuropathy (H)        Comments:  Reviewed the diabetes and health history  Recent SG trends indicate higher afternoon postprandial glucose levels  Reviewed and interpreted tests that I previously ordered.   Ordered appropriate tests for the endocrinology disease management.    Management options discussed and implemented after shared medical decision making with the patient.  T1DM problem is chronic-stable    Plan:  Discussed general issues with the diabetes diagnosis and managemen  We discussed the hgbA1c test which reflects previous overall glucose levels or control  Discussed the importance of blood glucose (BG) testing to assess glucose trends  Discussed use of the Accu-check Guide BG meter  Provided general overview of the multiple daily injection (MDI) plan using rapid acting mealtime               and longacting insulin medications  Reviewed recent Freestyle Libre2 CGM glucose sensor trend data, in detail      Recommend:  Continue current Fiasp mealtime & Lantus insulin MDI plan  Lantus Solostar                      12U each morning  Fiasp Flextouch                         Breakfast  1:12 (typically 6-7Uper meal)   Supper   1:12                                                  sscale 1U per 40>125                                                  target  mg/dl    Change dosing routine  for meals with predictable quantity (Such as morning muffin or breakfast sandwich), dose Fiasp premeal  Consider using the InsulinCalculator [employer]ela merry as insulin dose calculator, discussed  If premeal sensor glucose at low end of range, reduce the Fiasp dose by 1-2 units  Continue use of the Freestyle sensor   She is currently using Libre2 CGM with [place]   Consider upgrading Libre3 when [place] available  Goal target premeal glucose 80- 150 mg/dl  We have discussed hypoglycemia management, use of glucose tabs or jelly bean snack  Needs repeat lab tests in 11/2023   Consider testing at PCP appt   Lab orders placed  Continue the losartan daily dose  Continue current simvastatin lipid medication   Advise having fasting lipid panel testing and dilated eye examination at least annually     Addressed patient questions today    There are no Patient Instructions on file for this visit.    Future labs ordered today:   Orders Placed This Encounter   Procedures    GLUCOSE MONITOR, 72 HOUR, PHYS INTERP    Hemoglobin A1c    Basic metabolic panel    Vitamin D Deficiency     Radiology/Consults ordered today: None    Total time spent on day of encounter:  28 min    Follow-up:  12/19/23 at 8:30 am, 3/2024 mid morning    TI Lowe MD, MS  Endocrinology  [facility]

## 2023-08-14 NOTE — LETTER
8/14/2023         RE: Perlita Chavez  [address]  [place] [address]        Dear Colleague,    Thank you for referring your patient, Perlita Chavez, to the [facility] [place]. Please see a copy of my visit note below.    Recent issues:  Diabetes follow-up evaluation  Taking the Fiasp and Lantus insulin plan, also using the Freestyle Libre2 CGM sensor  Has seen an ophthalmology cornea specialist, told of dx dry eyes... now using lubricating eye drops & gel (preservative free)  Continues taking the Remicaid and (same) low dose Prednisone 3 mg daily          1972. Diagnosis of diabetes mellitus age 2 1/2, living in [facility]  Initial treatment with NPH and Regular insulin medications  Had seen Dr. Sarah Sierra/ Endocrinology  Switched to MDI insulin plan using Nv and Levemir insulin     8/19/14. Initial consult with me at my former clinic ([facility])  8/20/14. CDE RD evaluation, also tried demo OmniPod pump  Used Novolog Echo 1/2U pen, with I:C carb counting method  Tried Lantus BID dosing, then Tresiba (expensive), then Basaglaru              Previously used Novolog insulin  5/2018. Switched to Fiasp 3/2018, then Fiasp non-formulary 2019  Meals typically brunch 10am and supper 9:30pm  Takes Prednisone for RA, current dose 3 mg QAM  Discussed hypoglycemia treatment options, jelly beans vs glucose tablets    Current DM meds:  Lantus Solostar                      12U each morning  Fiasp Flextouch                         Breakfast  1:12 (typically 6-7Uper meal)   Supper   1:12                                                  sscale 1U per 40>125                                                  target  mg/dl    Frequently takes Fiasp postmeal    Has InsulinCalculator smart phone merry, but not used  Using Accucheck Guide BG meter              Has tested up to 4x/day              Does own I:C and ISF calculations  9/2019. She has worn a free sample LibrePersonal continuous  glucose sensor              Started Freestyle Mary CGM, then upgraded to Libre2 CGM with [place]    Recent Freestyle Libre2 data:          Previous [facility] labs include:  Lab Results   Component Value Date    A1C 7.3 (H) 04/27/2023     04/27/2023    POTASSIUM 4.4 04/27/2023    CHLORIDE 103 04/27/2023    CO2 29 04/27/2023    ANIONGAP 10 04/27/2023     (H) 04/27/2023    BUN 19.9 04/27/2023    CR 0.95 04/27/2023    GFRESTIMATED 72 04/27/2023    GFRESTBLACK 87 02/19/2021    FINESSE 9.7 04/27/2023    CHOL 150 02/23/2022    TRIG 89 02/23/2022    HDL 65 02/23/2022    LDL 67 02/23/2022    NHDL 85 02/23/2022    UCRR 339.0 04/27/2023    MICROL 51.2 04/27/2023    UMALCR 15.10 04/27/2023     Lab Results   Component Value Date    TSH 3.37 04/27/2023    T4 0.96 02/18/2011     No history of vascular disease.  Takes simvastatin for hyperlipidemia management.  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 Humberto Melendez[facility] for general medicine evaluations.  Also sees Dr. Blanco/[facility] rheumatology      PMH/PSH:  Past Medical History:   Diagnosis Date     Diabetic retinopathy associated with type 1 diabetes mellitus (H)     proliferative DR and bilateral laser PC surgeries     Dry eyes      Hyperlipidemia LDL goal < 130      RA (rheumatoid arthritis) (H)     seeing rheumatologist.     Sjoegren syndrome     seeing rheumatologist.     Type 1  diabetes mellitus (H)     retinopathy, neuropathy     Visual impairment      Past Surgical History:   Procedure Laterality Date     [facility] NONSPECIFIC PROCEDURE  8/00/97    T & A     Z NONSPECIFIC PROCEDURE  5/00/99    L eye surgery     Z NONSPECIFIC PROCEDURE  4/10/01    R cataract + IOL     [facility] NONSPECIFIC PROCEDURE  2002    bilat vitrectomy     Z NONSPECIFIC PROCEDURE  7/04    R eye vitrectomy     Z NONSPECIFIC PROCEDURE  5/08    R vitrectomy & partial retinal detachment        Family Hx:  Family History   Problem Relation Age of Onset     Arthritis Mother         on celebrex,alive & healthy 2002     Breast Cancer Mother      Arthritis Father         on celebrex(2002)     Heart Disease Father         heart attack 1990, 60 years old (2002)     Diabetes Father      Cerebrovascular Disease Father         age 70     Thyroid Disease Sister         on thyroid med for couple of years , 35 yrs now(2002)     Cancer Paternal Grandmother         STOMACH     Cerebrovascular Disease Paternal Grandfather         age 90     Breast Cancer Cousin 55.00     Colorectal Cancer Cousin      Breast Cancer Other      Colorectal Cancer Other          Social Hx:  Social History     Socioeconomic History     Marital status: Single     Spouse name:      Number of children: 0     Years of education: Not on file     Highest education level: Not on file   Occupational History     Occupation: [employer]     Employer: [employer] [place] [employer]   Tobacco Use     Smoking status: Never     Smokeless tobacco: Never   Substance and Sexual Activity     Alcohol use: Yes     Comment: 1 drink every 3 weeks     Drug use: No     Sexual activity: Not Currently     Partners: Male     Comment:    Other Topics Concern     Parent/sibling w/ CABG, MI or angioplasty before 65F 55M? Not Asked   Social History Narrative     Not on file     Social Determinants of Health     Financial Resource Strain: Not on file   Food Insecurity: Not on file  "  Transportation Needs: Not on file   Physical Activity: Not on file   Stress: Not on file   Social Connections: Not on file   Intimate Partner Violence: Not on file   Housing Stability: Not on file          MEDICATIONS:  has a current medication list which includes the following prescription(s): brimonidine, freestyle thea 2 sensor, continuous blood glucose monitoring, restasis, dorzolamide-timolol, ferrous sulfate, folic acid, infliximab, insulin aspart, lantus solostar, latanoprost, losartan, methotrexate, fish oil triple strength, one daily multiple vitamin, prednisone, simvastatin, sulfasalazine, valacyclovir, blood glucose, blood glucose, blood glucose monitoring, blood glucose, diclofenac, and bd adelfo u/f.        ROS: 10 point ROS neg other than the symptoms noted above in the HPI.     GENERAL: some fatigue; weight stable; denies fevers, chills, night sweats.   HEENT: minimal/no vision from right eye, dry eyes/mouth; no dysphagia, odonophagia, diplopia  THYROID:  no apparent hyper or hypothyroid symptoms  CV: no chest pain, pressure, palpitations  LUNGS: no SOB, FUENTES, cough, wheezing   ABDOMEN: rare morning nausea; no diarrhea, constipation, abdominal pain  EXTREMITIES: no rashes, ulcers, edema  NEUROLOGY: decreased sensation at feet; no headaches, denies changes in vision, tingling, extremitiy numbness   MSK: mild hand tendinitis pains; no muscle aches, weakness  SKIN: scalp hair thinning; denies rashes or lesions/foot sores  :  no menses since stopping OCP medication 1/2021  PSYCH:  stable mood, no significant anxiety or depression  ENDOCRINE: intermittent hot flashes    Physical Exam   VS: BP (!) 170/75   Pulse 82   Ht 1.613 m (5' 3.5\")   Wt 53.4 kg (117 lb 11.2 oz)   BMI 20.52 kg/m     CONSTITUTIONAL: healthy, alert and NAD, well dressed, answering questions appropriately  ENT: no nose swelling or nasal discharge, mouth redness or gum changes.  EYES: eyes grossly normal to inspection, conjunctivae " and sclerae normal, no exophthalmos or proptosis  THYROID:  no apparent nodules or goiter  LUNGS: no audible wheeze, cough or visible cyanosis, no visible retractions or increased work of breathing  ABDOMEN: abdomen normal size  EXTREMITIES: slowed flexion with left middle finger; no hand tremors, limited exam  NEUROLOGY: CN grossly intact, mentation intact and speech normal   SKIN:  no apparent skin lesions, rash, or edema with visualized skin appearance  PSYCH: mentation appears normal, affect normal/bright, judgement and insight intact,   normal speech and appearance well groomed    LABS:     All pertinent notes, labs, and images personally reviewed by me.        A/P:  Encounter Diagnoses   Name Primary?     Type 1 diabetes mellitus with proliferative retinopathy, macular edema presence unspecified, unspecified laterality, unspecified proliferative retinopathy type (H) Yes     Type 1 diabetes mellitus with diabetic neuropathy (H)        Comments:  Reviewed the diabetes and health history  Recent SG trends indicate higher afternoon postprandial glucose levels  Reviewed and interpreted tests that I previously ordered.   Ordered appropriate tests for the endocrinology disease management.    Management options discussed and implemented after shared medical decision making with the patient.  T1DM problem is chronic-stable    Plan:  Discussed general issues with the diabetes diagnosis and managemen  We discussed the hgbA1c test which reflects previous overall glucose levels or control  Discussed the importance of blood glucose (BG) testing to assess glucose trends  Discussed use of the Accu-check Guide BG meter  Provided general overview of the multiple daily injection (MDI) plan using rapid acting mealtime               and longacting insulin medications  Reviewed recent Freestyle Libre2 CGM glucose sensor trend data, in detail      Recommend:  Continue current Fiasp mealtime & Lantus insulin MDI plan  Lantus Solostar                       12U each morning  Fiasp Flextouch                         Breakfast  1:12 (typically 6-7Uper meal)   Supper   1:12                                                  sscale 1U per 40>125                                                  target  mg/dl    Change dosing routine for meals with predictable quantity (Such as morning muffin or breakfast sandwich), dose Fiasp premeal  Consider using the InsulinCalculator [employer] merry as insulin dose calculator, discussed  If premeal sensor glucose at low end of range, reduce the Fiasp dose by 1-2 units  Continue use of the Freestyle sensor   She is currently using Libre2 CGM with [place]   Consider upgrading Libre3 when [place] available  Goal target premeal glucose 80- 150 mg/dl  We have discussed hypoglycemia management, use of glucose tabs or jelly bean snack  Needs repeat lab tests in 11/2023   Consider testing at PCP appt   Lab orders placed  Continue the losartan daily dose  Continue current simvastatin lipid medication   Advise having fasting lipid panel testing and dilated eye examination at least annually     Addressed patient questions today    There are no Patient Instructions on file for this visit.    Future labs ordered today:   Orders Placed This Encounter   Procedures     GLUCOSE MONITOR, 72 HOUR, PHYS INTERP     Hemoglobin A1c     Basic metabolic panel     Vitamin D Deficiency     Radiology/Consults ordered today: None    Total time spent on day of encounter:  28 min    Follow-up:  12/19/23 at 8:30 am, 3/2024 mid morning    TI Lowe MD, MS  Endocrinology  [facility]                        Again, thank you for allowing me to participate in the care of your patient.        Sincerely,        Saurabh Lowe MD

## 2023-08-21 DIAGNOSIS — E10.3599 TYPE 1 DIABETES MELLITUS WITH PROLIFERATIVE RETINOPATHY, MACULAR EDEMA PRESENCE UNSPECIFIED, UNSPECIFIED LATERALITY, UNSPECIFIED PROLIFERATIVE RETINOPATHY TYPE (H): ICD-10-CM

## 2023-08-21 DIAGNOSIS — E78.5 HYPERLIPIDEMIA LDL GOAL <100: ICD-10-CM

## 2023-08-21 DIAGNOSIS — I10 ESSENTIAL HYPERTENSION: ICD-10-CM

## 2023-08-22 NOTE — TELEPHONE ENCOUNTER
Patient is overdue for appointment/lipid labs / med check,  last seen more than 1-1/2 years ago, patient needs appointment for refills- I can see her on 08/24/23 at 3 pm slot, check in time 2;40 pm , pl inform pt and schedule.

## 2023-08-23 RX ORDER — LOSARTAN POTASSIUM 25 MG/1
TABLET ORAL
Qty: 90 TABLET | Refills: 0 | Status: SHIPPED | OUTPATIENT
Start: 2023-08-23 | End: 2023-11-22

## 2023-08-23 RX ORDER — SIMVASTATIN 40 MG
TABLET ORAL
Qty: 90 TABLET | Refills: 0 | Status: SHIPPED | OUTPATIENT
Start: 2023-08-23 | End: 2023-11-22

## 2023-08-23 NOTE — TELEPHONE ENCOUNTER
Called patient and assisted in scheduling an appointment.    Next 5 appointments (look out 90 days)      Nov 21, 2023  9:00 AM  (Arrive by 8:40 AM)  Provider Visit with Calos Melendez MD  [facility] [address]  [address]  [facility] [address]  [phone]

## 2023-10-17 ENCOUNTER — TRANSFERRED RECORDS (OUTPATIENT)
Dept: HEALTH INFORMATION MANAGEMENT | Facility: CLINIC | Age: 53
End: 2023-10-17
Payer: COMMERCIAL

## 2023-10-17 LAB
ALT SERPL-CCNC: 21 U/L (ref 5–35)
AST SERPL-CCNC: 32 U/L (ref 5–34)
CREATININE (EXTERNAL): 0.89 MG/DL (ref 0.5–1.3)
GFR ESTIMATED (EXTERNAL): 70.8 ML/MIN/1.73M2

## 2023-11-04 ENCOUNTER — HEALTH MAINTENANCE LETTER (OUTPATIENT)
Age: 53
End: 2023-11-04

## 2023-11-10 ENCOUNTER — TRANSFERRED RECORDS (OUTPATIENT)
Dept: HEALTH INFORMATION MANAGEMENT | Facility: CLINIC | Age: 53
End: 2023-11-10
Payer: COMMERCIAL

## 2023-11-10 LAB — RETINOPATHY: POSITIVE

## 2023-11-22 ENCOUNTER — HOSPITAL ENCOUNTER (OUTPATIENT)
Dept: MAMMOGRAPHY | Facility: CLINIC | Age: 53
Discharge: HOME | End: 2023-11-22
Attending: INTERNAL MEDICINE | Admitting: INTERNAL MEDICINE
Payer: COMMERCIAL

## 2023-11-22 ENCOUNTER — OFFICE VISIT (OUTPATIENT)
Dept: INTERNAL MEDICINE | Facility: CLINIC | Age: 53
End: 2023-11-22
Payer: COMMERCIAL

## 2023-11-22 VITALS
OXYGEN SATURATION: 99 % | HEIGHT: 64 IN | BODY MASS INDEX: 19.65 KG/M2 | TEMPERATURE: 97.3 F | HEART RATE: 91 BPM | SYSTOLIC BLOOD PRESSURE: 126 MMHG | DIASTOLIC BLOOD PRESSURE: 76 MMHG | WEIGHT: 115.1 LBS | RESPIRATION RATE: 14 BRPM

## 2023-11-22 DIAGNOSIS — E10.3599 TYPE 1 DIABETES MELLITUS WITH PROLIFERATIVE RETINOPATHY, MACULAR EDEMA PRESENCE UNSPECIFIED, UNSPECIFIED LATERALITY, UNSPECIFIED PROLIFERATIVE RETINOPATHY TYPE (H): ICD-10-CM

## 2023-11-22 DIAGNOSIS — E78.5 HYPERLIPIDEMIA LDL GOAL <100: ICD-10-CM

## 2023-11-22 DIAGNOSIS — I10 PRIMARY HYPERTENSION: ICD-10-CM

## 2023-11-22 DIAGNOSIS — Z12.31 VISIT FOR SCREENING MAMMOGRAM: ICD-10-CM

## 2023-11-22 DIAGNOSIS — M06.9 RHEUMATOID ARTHRITIS, INVOLVING UNSPECIFIED SITE, UNSPECIFIED WHETHER RHEUMATOID FACTOR PRESENT (H): ICD-10-CM

## 2023-11-22 DIAGNOSIS — D84.9 IMMUNOSUPPRESSED STATUS (H): ICD-10-CM

## 2023-11-22 DIAGNOSIS — Z12.11 SCREEN FOR COLON CANCER: Primary | ICD-10-CM

## 2023-11-22 DIAGNOSIS — Z00.00 ROUTINE HISTORY AND PHYSICAL EXAMINATION OF ADULT: ICD-10-CM

## 2023-11-22 DIAGNOSIS — I10 ESSENTIAL HYPERTENSION: ICD-10-CM

## 2023-11-22 LAB
ERYTHROCYTE [DISTWIDTH] IN BLOOD BY AUTOMATED COUNT: 12.2 % (ref 10–15)
HBA1C MFR BLD: 7.3 % (ref 0–5.6)
HCT VFR BLD AUTO: 34.2 % (ref 35–47)
HGB BLD-MCNC: 11.2 G/DL (ref 11.7–15.7)
MCH RBC QN AUTO: 34.5 PG (ref 26.5–33)
MCHC RBC AUTO-ENTMCNC: 32.7 G/DL (ref 31.5–36.5)
MCV RBC AUTO: 105 FL (ref 78–100)
PLATELET # BLD AUTO: 259 10E3/UL (ref 150–450)
RBC # BLD AUTO: 3.25 10E6/UL (ref 3.8–5.2)
WBC # BLD AUTO: 6.3 10E3/UL (ref 4–11)

## 2023-11-22 PROCEDURE — 80053 COMPREHEN METABOLIC PANEL: CPT | Performed by: INTERNAL MEDICINE

## 2023-11-22 PROCEDURE — 83540 ASSAY OF IRON: CPT | Performed by: INTERNAL MEDICINE

## 2023-11-22 PROCEDURE — 77067 SCR MAMMO BI INCL CAD: CPT

## 2023-11-22 PROCEDURE — 36415 COLL VENOUS BLD VENIPUNCTURE: CPT | Performed by: INTERNAL MEDICINE

## 2023-11-22 PROCEDURE — 83036 HEMOGLOBIN GLYCOSYLATED A1C: CPT | Performed by: INTERNAL MEDICINE

## 2023-11-22 PROCEDURE — 84443 ASSAY THYROID STIM HORMONE: CPT | Performed by: INTERNAL MEDICINE

## 2023-11-22 PROCEDURE — 82043 UR ALBUMIN QUANTITATIVE: CPT | Performed by: INTERNAL MEDICINE

## 2023-11-22 PROCEDURE — 99396 PREV VISIT EST AGE 40-64: CPT | Performed by: INTERNAL MEDICINE

## 2023-11-22 PROCEDURE — 85027 COMPLETE CBC AUTOMATED: CPT | Performed by: INTERNAL MEDICINE

## 2023-11-22 PROCEDURE — 80061 LIPID PANEL: CPT | Performed by: INTERNAL MEDICINE

## 2023-11-22 PROCEDURE — 82306 VITAMIN D 25 HYDROXY: CPT | Performed by: INTERNAL MEDICINE

## 2023-11-22 PROCEDURE — 83550 IRON BINDING TEST: CPT | Performed by: INTERNAL MEDICINE

## 2023-11-22 PROCEDURE — 99213 OFFICE O/P EST LOW 20 MIN: CPT | Mod: 25 | Performed by: INTERNAL MEDICINE

## 2023-11-22 PROCEDURE — 82570 ASSAY OF URINE CREATININE: CPT | Performed by: INTERNAL MEDICINE

## 2023-11-22 RX ORDER — SIMVASTATIN 40 MG
TABLET ORAL
Qty: 90 TABLET | Refills: 0 | OUTPATIENT
Start: 2023-11-22

## 2023-11-22 RX ORDER — LOSARTAN POTASSIUM 25 MG/1
TABLET ORAL
Qty: 90 TABLET | Refills: 0 | OUTPATIENT
Start: 2023-11-22

## 2023-11-22 RX ORDER — LOSARTAN POTASSIUM 25 MG/1
25 TABLET ORAL DAILY
Qty: 90 TABLET | Refills: 1 | Status: SHIPPED | OUTPATIENT
Start: 2023-11-22 | End: 2024-05-24

## 2023-11-22 RX ORDER — SIMVASTATIN 40 MG
TABLET ORAL
Qty: 90 TABLET | Refills: 1 | Status: SHIPPED | OUTPATIENT
Start: 2023-11-22 | End: 2024-05-24

## 2023-11-22 NOTE — NURSING NOTE
"/76   Pulse 91   Temp 97.3  F (36.3  C) (Tympanic)   Resp 14   Ht 1.613 m (5' 3.5\")   Wt 52.2 kg (115 lb 1.6 oz)   LMP 11/20/2023   SpO2 99%   BMI 20.07 kg/m      "

## 2023-11-22 NOTE — PROGRESS NOTES
SUBJECTIVE:   Perlita is a 53 year old, presenting for the following:  Physical        11/22/2023     8:43 AM   Additional Questions   Roomed by ezio   Accompanied by self         11/22/2023     8:43 AM   Patient Reported Additional Medications   Patient reports taking the following new medications none       Healthy Habits:     Getting at least 3 servings of Calcium per day:  Yes    Bi-annual eye exam:  Yes    Dental care twice a year:  Yes    Sleep apnea or symptoms of sleep apnea:  None    Diet:  Regular (no restrictions)    Frequency of exercise:  2-3 days/week    Duration of exercise:  15-30 minutes    Taking medications regularly:  No    Barriers to taking medications:  None    Medication side effects:  None    Additional concerns today:  No     History of for type 1 diabetes follows up with the endocrinologist and is on insulin      Hyperlipidemia Follow-Up    Are you regularly taking any medication or supplement to lower your cholesterol?   Yes- simvastatin   Are you having muscle aches or other side effects that you think could be caused by your cholesterol lowering medication?  No    Hypertension Follow-up    Do you check your blood pressure regularly outside of the clinic? No   Are you following a low salt diet? Yes  Are your blood pressures ever more than 140 on the top number (systolic) OR more   than 90 on the bottom number (diastolic), for example 140/90? Yes    Past Medical History:   Diagnosis Date    Diabetic retinopathy associated with type 1 diabetes mellitus (H)     proliferative DR and bilateral laser PC surgeries    Dry eyes     Hyperlipidemia LDL goal < 130     RA (rheumatoid arthritis) (H)     seeing rheumatologist.    Sjoegren syndrome     seeing rheumatologist.    Type 1 diabetes mellitus (H)     retinopathy, neuropathy    Visual impairment        Past Surgical History:   Procedure Laterality Date    [facility] NONSPECIFIC PROCEDURE  8/00/97    T & A    [facility] NONSPECIFIC PROCEDURE  5/00/99    L  eye surgery    [facility] NONSPECIFIC PROCEDURE  4/10/01    R cataract + IOL    [facility] NONSPECIFIC PROCEDURE  2002    bilat vitrectomy    [facility] NONSPECIFIC PROCEDURE  7/04    R eye vitrectomy    [facility] NONSPECIFIC PROCEDURE  5/08    R vitrectomy & partial retinal detachment        Current Outpatient Medications   Medication Sig Dispense Refill    brimonidine (ALPHAGAN) 0.2 % ophthalmic solution INT 1 GTT IN OU BID  3    Continuous Blood Gluc Sensor (FREESTYLE LOUIS 2 SENSOR) MISC CHANGE EVERY 14 DAYS. 2 each 11    continuous blood glucose monitoring (FREESTYLE LOUIS) sensor For use with Freestyle Louis Flash  for continuous monitioring of blood glucose levels. Replace sensor every 14 days. 2 each 11    cycloSPORINE (RESTASIS) 0.05 % ophthalmic emulsion Restasis 0.05 % eye drops in a dropperette   INT 1 GTT IN OU BID      diclofenac (VOLTAREN) 75 MG EC tablet TK 1 T PO BID  2    dorzolamide-timolol (COSOPT) 2-0.5 % ophthalmic solution INSTILL 1 GTT IN OU QAM  6    Ferrous Sulfate (IRON PO) Blood builder      FOLIC ACID PO Take 1 mg by mouth 2 times daily 500      insulin aspart (FIASP FLEXTOUCH) 100 UNIT/ML pen-injector ADMINISTER 3 TO 8 UNITS UNDER THE SKIN THREE TIMES DAILY WITH MEALS Strength: 100 UNIT/ML 15 mL 5    insulin glargine (LANTUS SOLOSTAR) 100 UNIT/ML pen Inject 11-13 Units Subcutaneous every morning 15 mL 5    insulin pen needle (BD ELIUD U/F) 32G X 4 MM miscellaneous Use 4 pen needles daily or as directed. 400 each 3    latanoprost (XALATAN) 0.005 % ophthalmic solution       losartan (COZAAR) 25 MG tablet Take 1 tablet (25 mg) by mouth daily 90 tablet 1    Methotrexate Sodium (METHOTREXATE PO) Take by mouth once a week 5 tablets once a week      Omega-3 Fatty Acids (FISH OIL TRIPLE STRENGTH) 1400 MG CAPS Take 2 tablets by mouth daily.      ONE DAILY MULTIPLE VITAMIN OR TABS one daily  0    predniSONE (DELTASONE) 5 MG tablet 2.5 mg  0    simvastatin (ZOCOR) 40 MG tablet TAKE 1 TABLET BY MOUTH AT BEDTIME 90  tablet 1    sulfaSALAzine (AZULFIDINE) 500 MG tablet Take 500 mg by mouth 2 times daily 2 tablets twice daily      valACYclovir (VALTREX) 500 MG tablet Take 500 mg by mouth daily  14 tablet prn    blood glucose (EDWIN CONTOUR) test strip 1 strip by In Vitro route 4 times daily (before meals and nightly) Checks 4-5 x daily (Patient not taking: Reported on 11/22/2023) 400 strip 3    blood glucose (NO BRAND SPECIFIED) test strip Use to test blood sugar 4 times daily or as directed, Accu-check Guide test strips, E10.9. (Patient not taking: Reported on 11/22/2023) 400 strip 0    blood glucose monitoring (NO BRAND SPECIFIED) meter device kit Use to test blood sugar 5 times daily or as directed, Accu-check Guide meter. (Patient not taking: Reported on 2/23/2022) 1 kit 1    blood glucose monitoring (NO BRAND SPECIFIED) test strip Use to test blood sugar 4 times daily or as directed, Accu-check Guide meter test strips (Patient not taking: Reported on 2/23/2022) 400 strip 3    InFLIXimab (REMICADE IV) Inject 300 mg into the vein Every 7 weeks (Patient not taking: Reported on 11/22/2023)         Family History   Problem Relation Age of Onset    Arthritis Mother         on celebrex,alive & healthy 2002    Breast Cancer Mother     Syncope Mother     Arthritis Father         on celebrex(2002)    Heart Disease Father         heart attack 1990, 60 years old (2002)    Diabetes Father     Cerebrovascular Disease Father         age 70    Thyroid Disease Sister         on thyroid med for couple of years , 35 yrs now(2002)    Cancer Paternal Grandmother         STOMACH    Cerebrovascular Disease Paternal Grandfather         age 90    Breast Cancer Cousin 55    Colorectal Cancer Cousin     Breast Cancer Other     Colorectal Cancer Other          Social History     Tobacco Use    Smoking status: Never    Smokeless tobacco: Never   Substance Use Topics    Alcohol use: Yes     Comment: 1 drink every 3 weeks              2/23/2022     8:27  "AM   Alcohol Use   Prescreen: >3 drinks/day or >7 drinks/week? No       Reviewed orders with patient.  Reviewed health maintenance and updated orders accordingly - Yes  Lab work is in process    Breast Cancer Screening:   Pertinent mammograms are reviewed under the imaging tab.    History of abnormal Pap smear: NO - age 30-65 PAP every 5 years with negative HPV co-testing recommended     Reviewed and updated as needed this visit by clinical staff   Tobacco                Reviewed and updated as needed this visit by Provider                     Review of Systems  CONSTITUTIONAL: NEGATIVE for fever, chills, change in weight  EYES: sees ophthalmologist   ENT: NEGATIVE for ear, mouth and throat problems  RESP: NEGATIVE for significant cough or SOB  BREAST: NEGATIVE for masses, tenderness or discharge  CV: NEGATIVE for chest pain, palpitations or peripheral edema  GI: NEGATIVE for nausea, abdominal pain, heartburn, or change in bowel habits  : NEGATIVE for unusual urinary or vaginal symptoms. Periods are regular.  MUSCULOSKELETAL: NEGATIVE for significant arthralgias or myalgia  NEURO: NEGATIVE for weakness, dizziness or paresthesias  PSYCHIATRIC: NEGATIVE for changes in mood or affect     OBJECTIVE:   /76   Pulse 91   Temp 97.3  F (36.3  C) (Tympanic)   Resp 14   Ht 1.613 m (5' 3.5\")   Wt 52.2 kg (115 lb 1.6 oz)   LMP 11/20/2023   SpO2 99%   BMI 20.07 kg/m    Physical Exam  GENERAL APPEARANCE: healthy, alert and no distress  EYES: Pupils unequal  HENT: ear canals and TM's normal, nose and mouth without ulcers or lesions, oropharynx clear and oral mucous membranes moist  RESP: lungs clear to auscultation - no rales, rhonchi or wheezes  BREAST: normal without masses, tenderness or nipple discharge and no palpable axillary masses or adenopathy  CV: regular rate and rhythm, normal S1 S2,   ABDOMEN: soft, nontender,   and bowel sounds normal  MS: no musculoskeletal defects are noted and gait is age " appropriate without ataxia  NEURO: Normal strength and tone, sensory exam grossly normal, mentation intact and speech normal  DIABETIC FOOT EXAM: normal DP and PT pulses, no trophic changes or ulcerative lesions, normal sensory exam, and normal monofilament exam  PSYCH: mentation appears normal and affect normal/bright    ASSESSMENT/PLAN:     (Z00.00) Routine history and physical examination of adult  Plan: CBC with platelets, Iron & Iron Binding         Capacity            (I10) Primary hypertension  Comment: Blood pressure stable  Plan: Comprehensive metabolic panel, losartan         (COZAAR) 25 MG tablet refilled as directed.explained clearly about the medication,insructions and side effects.            (E78.5) Hyperlipidemia LDL goal <100  Plan: Lipid panel reflex to direct LDL Fasting,         simvastatin (ZOCOR) 40 MG tablet refilled as directed.explained clearly about the medication,insructions and side effects.            (Z12.31) Visit for screening mammogram  Plan: MA SCREENING DIGITAL BILAT - Future  (s+30)            (E10.3119) Type 1 diabetes mellitus with proliferative retinopathy, macular edema presence unspecified, unspecified laterality, unspecified proliferative retinopathy type (H)  Plan: Follows up with endocrinologist, patient had to take glucagon tablet before the blood draw today.      (Z12.11) Screen for colon cancer    Plan: Patient declined colonoscopy, ordered fecal colorectal cancer screen (FIT)             (D84.9) Immunosuppressed status (H24)  (M06.9) Rheumatoid arthritis, involving unspecified site, unspecified whether rheumatoid factor present (H)  Plan: Follows up with rheumatologist and is on Remicade infusions and prednisone        Patient has been advised of split billing requirements and indicates understanding: Yes      COUNSELING:  Reviewed preventive health counseling, as reflected in patient instructions       Regular exercise       Healthy diet/nutrition        Immunizations  Declined at this time as she had Remicade infusion yesterday and she was advised not to take any vaccines by her rheumatologist for 1 week        She reports that she has never smoked. She has never used smokeless tobacco.          Calos Melendez MD  [facility]

## 2023-11-23 LAB
ALBUMIN SERPL BCG-MCNC: 4.3 G/DL (ref 3.5–5.2)
ALP SERPL-CCNC: 53 U/L (ref 40–150)
ALT SERPL W P-5'-P-CCNC: 26 U/L (ref 0–50)
ANION GAP SERPL CALCULATED.3IONS-SCNC: 8 MMOL/L (ref 7–15)
AST SERPL W P-5'-P-CCNC: 30 U/L (ref 0–45)
BILIRUB SERPL-MCNC: 0.8 MG/DL
BUN SERPL-MCNC: 15.8 MG/DL (ref 6–20)
CALCIUM SERPL-MCNC: 9.2 MG/DL (ref 8.6–10)
CHLORIDE SERPL-SCNC: 105 MMOL/L (ref 98–107)
CHOLEST SERPL-MCNC: 126 MG/DL
CREAT SERPL-MCNC: 0.96 MG/DL (ref 0.51–0.95)
CREAT UR-MCNC: 160 MG/DL
DEPRECATED HCO3 PLAS-SCNC: 28 MMOL/L (ref 22–29)
EGFRCR SERPLBLD CKD-EPI 2021: 70 ML/MIN/1.73M2
GLUCOSE SERPL-MCNC: 71 MG/DL (ref 70–99)
HDLC SERPL-MCNC: 53 MG/DL
IRON BINDING CAPACITY (ROCHE): 221 UG/DL (ref 240–430)
IRON SATN MFR SERPL: 57 % (ref 15–46)
IRON SERPL-MCNC: 126 UG/DL (ref 37–145)
LDLC SERPL CALC-MCNC: 59 MG/DL
MICROALBUMIN UR-MCNC: 28.1 MG/L
MICROALBUMIN/CREAT UR: 17.56 MG/G CR (ref 0–25)
NONHDLC SERPL-MCNC: 73 MG/DL
POTASSIUM SERPL-SCNC: 3.7 MMOL/L (ref 3.4–5.3)
PROT SERPL-MCNC: 8.1 G/DL (ref 6.4–8.3)
SODIUM SERPL-SCNC: 141 MMOL/L (ref 135–145)
TRIGL SERPL-MCNC: 71 MG/DL
TSH SERPL DL<=0.005 MIU/L-ACNC: 3.51 UIU/ML (ref 0.3–4.2)
VIT D+METAB SERPL-MCNC: 38 NG/ML (ref 20–50)

## 2023-11-30 ENCOUNTER — ALLIED HEALTH/NURSE VISIT (OUTPATIENT)
Dept: INTERNAL MEDICINE | Facility: CLINIC | Age: 53
End: 2023-11-30
Payer: COMMERCIAL

## 2023-11-30 DIAGNOSIS — Z23 NEEDS FLU SHOT: ICD-10-CM

## 2023-11-30 DIAGNOSIS — Z23 NEED FOR COVID-19 VACCINE: Primary | ICD-10-CM

## 2023-11-30 PROCEDURE — 90686 IIV4 VACC NO PRSV 0.5 ML IM: CPT

## 2023-11-30 PROCEDURE — 99207 PR NO CHARGE NURSE ONLY: CPT

## 2023-11-30 PROCEDURE — 90471 IMMUNIZATION ADMIN: CPT

## 2023-11-30 PROCEDURE — 91320 SARSCV2 VAC 30MCG TRS-SUC IM: CPT

## 2023-11-30 PROCEDURE — 90480 ADMN SARSCOV2 VAC 1/ONLY CMP: CPT

## 2023-12-19 ENCOUNTER — OFFICE VISIT (OUTPATIENT)
Dept: ENDOCRINOLOGY | Facility: CLINIC | Age: 53
End: 2023-12-19
Payer: COMMERCIAL

## 2023-12-19 VITALS
DIASTOLIC BLOOD PRESSURE: 84 MMHG | HEART RATE: 84 BPM | WEIGHT: 114.4 LBS | BODY MASS INDEX: 19.53 KG/M2 | SYSTOLIC BLOOD PRESSURE: 147 MMHG | HEIGHT: 64 IN

## 2023-12-19 DIAGNOSIS — E10.3599 TYPE 1 DIABETES MELLITUS WITH PROLIFERATIVE RETINOPATHY, MACULAR EDEMA PRESENCE UNSPECIFIED, UNSPECIFIED LATERALITY, UNSPECIFIED PROLIFERATIVE RETINOPATHY TYPE (H): Primary | ICD-10-CM

## 2023-12-19 DIAGNOSIS — E10.40 TYPE 1 DIABETES MELLITUS WITH DIABETIC NEUROPATHY (H): ICD-10-CM

## 2023-12-19 PROCEDURE — 99214 OFFICE O/P EST MOD 30 MIN: CPT | Performed by: INTERNAL MEDICINE

## 2023-12-19 PROCEDURE — 95251 CONT GLUC MNTR ANALYSIS I&R: CPT | Performed by: INTERNAL MEDICINE

## 2023-12-19 RX ORDER — BLOOD-GLUCOSE SENSOR
1 EACH MISCELLANEOUS CONTINUOUS PRN
Qty: 2 EACH | Refills: 5 | Status: SHIPPED | OUTPATIENT
Start: 2023-12-19 | End: 2024-08-02

## 2023-12-19 NOTE — Clinical Note
12/19/2023         RE: Perlita Chavez  [address]  [facility] [address]        Dear Colleague,    Thank you for referring your patient, Perlita Chavez, to the [facility] [place]. Please see a copy of my visit note below.    Recent issues:  Diabetes follow-up evaluation  Taking the Fiasp and Lantus insulin plan, also using the Freestyle Libre2 CGM sensor  Continues taking the Remicaid and (same) low dose Prednisone 2.5 mg daily  Recent PCP appt, told of low iron levels  Now has new smartphone, plans to download Libre2 merry soon          1972. Diagnosis of diabetes mellitus age 2 1/2, living in [facility]  Initial treatment with NPH and Regular insulin medications  Had seen Dr. Sarah Sierra/ Endocrinology  Switched to MDI insulin plan using Nv and Levemir insulin     8/19/14. Initial consult with me at my former clinic ([facility])  8/20/14. CDE RD evaluation, also tried demo OmniPod pump  Used Novolog Echo 1/2U pen, with I:C carb counting method  Tried Lantus BID dosing, then Tresiba (expensive), then Basaglaru              Previously used Novolog insulin  5/2018. Switched to Fiasp 3/2018, then Fiasp non-formulary 2019  Meals typically brunch 10am and supper 9:30pm  Takes Prednisone for RA, current dose 3 mg QAM  Discussed hypoglycemia treatment options, jelly beans vs glucose tablets    Current DM meds:  Lantus Solostar                      12U each morning  Fiasp Flextouch                         Breakfast  1:12 (typically 6-7U)   Supper   1:12 (typically 6-10U)                                                  sscale 1U per 40>125                                                  target  mg/dl    Frequently takes Fiasp postmeal    Has InsulinCalculator smart phone merry, but not used  Using Accucheck Guide BG meter              Has tested up to 4x/day              Does own I:C and ISF calculations  9/2019. She has worn a free sample LibrePersonal continuous glucose sensor               Started Freestyle Mary CGM, then upgraded to Libre2 CGM with [employer]    Recent Freestyle Libre2 data:            Previous [facility] labs include:  Lab Results   Component Value Date    A1C 7.3 (H) 11/22/2023     11/22/2023    POTASSIUM 3.7 11/22/2023    CHLORIDE 105 11/22/2023    CO2 28 11/22/2023    ANIONGAP 8 11/22/2023    GLC 71 11/22/2023    BUN 15.8 11/22/2023    CR 0.96 (H) 11/22/2023    GFRESTIMATED 70 11/22/2023    GFRESTBLACK 87 02/19/2021    FINESSE 9.2 11/22/2023    CHOL 126 11/22/2023    TRIG 71 11/22/2023    HDL 53 11/22/2023    LDL 59 11/22/2023    NHDL 73 11/22/2023    UCRR 160.0 11/22/2023    MICROL 28.1 11/22/2023    UMALCR 17.56 11/22/2023     Lab Results   Component Value Date    TSH 3.51 11/22/2023    T4 0.96 02/18/2011     No history of vascular disease.  Takes simvastatin for hyperlipidemia management.  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 Humberto Melendez/ [facility] for general medicine evaluations.  Also sees Dr. Blanco/[facility] rheumatology      PMH/PSH:  Past Medical History:   Diagnosis Date    Diabetic retinopathy associated with type 1 diabetes mellitus (H)     proliferative DR and bilateral laser PC surgeries    Dry eyes     Hyperlipidemia LDL goal < 130     RA (rheumatoid arthritis) (H)     seeing rheumatologist.    Sjoegren syndrome     seeing rheumatologist.    Type 1 diabetes mellitus (H)      retinopathy, neuropathy    Visual impairment      Past Surgical History:   Procedure Laterality Date    [facility] NONSPECIFIC PROCEDURE  8/00/97    T & A    [facility] NONSPECIFIC PROCEDURE  5/00/99    L eye surgery    [facility] NONSPECIFIC PROCEDURE  4/10/01    R cataract + IOL    [facility] NONSPECIFIC PROCEDURE  2002    bilat vitrectomy    [facility] NONSPECIFIC PROCEDURE  7/04    R eye vitrectomy    [facility] NONSPECIFIC PROCEDURE  5/08    R vitrectomy & partial retinal detachment        Family Hx:  Family History   Problem Relation Age of Onset    Arthritis Mother         on celebrex,alive & healthy 2002    Breast Cancer Mother     Syncope Mother     Arthritis Father         on celebrex(2002)    Heart Disease Father         heart attack 1990, 60 years old (2002)    Diabetes Father     Cerebrovascular Disease Father         age 70    Thyroid Disease Sister         on thyroid med for couple of years , 35 yrs now(2002)    Cancer Paternal Grandmother         STOMACH    Cerebrovascular Disease Paternal Grandfather         age 90    Breast Cancer Cousin 55    Colorectal Cancer Cousin     Breast Cancer Other     Colorectal Cancer Other          Social Hx:  Social History     Socioeconomic History    Marital status: Single     Spouse name:     Number of children: 0    Years of education: Not on file    Highest education level: Not on file   Occupational History    Occupation: [employer]     Employer: [employer]   Tobacco Use    Smoking status: Never    Smokeless tobacco: Never   Substance and Sexual Activity    Alcohol use: Yes     Comment: 1 drink every 3 weeks    Drug use: No    Sexual activity: Not Currently     Partners: Male     Comment:    Other Topics Concern    Parent/sibling w/ CABG, MI or angioplasty before 65F 55M? Not Asked   Social History Narrative    Not on file     Social Determinants of Health     Financial Resource Strain: Not on file   Food Insecurity: Not on file   Transportation Needs: Not on file    Physical Activity: Not on file   Stress: Not on file   Social Connections: Not on file   Interpersonal Safety: Low Risk  (11/22/2023)    Interpersonal Safety     Do you feel physically and emotionally safe where you currently live?: Yes     Within the past 12 months, have you been hit, slapped, kicked or otherwise physically hurt by someone?: No     Within the past 12 months, have you been humiliated or emotionally abused in other ways by your partner or ex-partner?: No   Housing Stability: Not on file          MEDICATIONS:  has a current medication list which includes the following prescription(s): brimonidine, freestyle thea 2 sensor, restasis, dorzolamide-timolol, ferrous sulfate, infliximab, insulin aspart, lantus solostar, latanoprost, losartan, methotrexate, fish oil triple strength, one daily multiple vitamin, prednisone, sulfasalazine, valacyclovir, blood glucose, blood glucose, blood glucose monitoring, blood glucose, continuous blood glucose monitoring, diclofenac, folic acid, bd adelfo u/f, and simvastatin.        ROS: 10 point ROS neg other than the symptoms noted above in the HPI.     GENERAL: some fatigue; weight stable; denies fevers, chills, night sweats.   HEENT: minimal/no vision from right eye, dry eyes/mouth; no dysphagia, odonophagia, diplopia  THYROID:  no apparent hyper or hypothyroid symptoms  CV: no chest pain, pressure, palpitations  LUNGS: no SOB, FUENTES, cough, wheezing   ABDOMEN: rare morning nausea; no diarrhea, constipation, abdominal pain  EXTREMITIES: no rashes, ulcers, edema  NEUROLOGY: decreased sensation at feet; no headaches, denies changes in vision, tingling, extremitiy numbness   MSK: mild hand tendinitis pains; no muscle aches, weakness  SKIN: scalp hair thinning; denies rashes or lesions/foot sores  :  no menses since stopping OCP medication 1/2021  PSYCH:  stable mood, no significant anxiety or depression  ENDOCRINE: intermittent hot flashes    Physical Exam   VS: BP (!)  "167/84   Pulse 84   Ht 1.613 m (5' 3.5\")   Wt 51.9 kg (114 lb 6.4 oz)   LMP 11/20/2023   BMI 19.95 kg/m     CONSTITUTIONAL: healthy, alert and NAD, well dressed, answering questions appropriately  ENT: no nose swelling or nasal discharge, mouth redness or gum changes.  EYES: eyes grossly normal to inspection, conjunctivae and sclerae normal, no exophthalmos or proptosis  THYROID:  no apparent nodules or goiter  LUNGS: no audible wheeze, cough or visible cyanosis, no visible retractions or increased work of breathing  ABDOMEN: abdomen normal size  EXTREMITIES: slowed flexion with left middle finger; no hand tremors, limited exam  NEUROLOGY: CN grossly intact, mentation intact and speech normal   SKIN:  no apparent skin lesions, rash, or edema with visualized skin appearance  PSYCH: mentation appears normal, affect normal/bright, judgement and insight intact,   normal speech and appearance well groomed    LABS:     All pertinent notes, labs, and images personally reviewed by me.        A/P:  No diagnosis found.      Comments:  Reviewed the diabetes and health history  Recent SG trends indicate higher afternoon postprandial glucose levels  Reviewed and interpreted tests that I previously ordered.   Ordered appropriate tests for the endocrinology disease management.    Management options discussed and implemented after shared medical decision making with the patient.  T1DM problem is chronic-stable    Plan:  Discussed general issues with the diabetes diagnosis and managemen  We discussed the hgbA1c test which reflects previous overall glucose levels or control  Discussed the importance of blood glucose (BG) testing to assess glucose trends  Discussed use of the Accu-check Guide BG meter  Provided general overview of the multiple daily injection (MDI) plan using rapid acting mealtime               and longacting insulin medications  Reviewed recent Freestyle Libre2 CGM glucose sensor trend data, in detail    "   Recommend:  Continue current Fiasp mealtime & Lantus insulin MDI plan  Lantus Solostar                      11U each morning  Fiasp Flextouch                         Breakfast  1:12    Supper   1:11                                                   sscale 1U per 40>125                                                  target  mg/dl    Lower breakfast Fiasp dose by 1U on workdays to avoid workplace hypoglycemia  Change dosing routine for meals with predictable quantity (Such as morning muffin or breakfast sandwich), dose Fiasp premeal  Consider using the InsulinCalculator "[employer] merry as insulin dose calculator, discussed  If premeal sensor glucose at low end of range, reduce the Fiasp dose by 1-2 units  Continue use of the Freestyle sensor, download the Libre2 and Libre3 apps   Discussed use of the "[employer] merry for Mary use   Will send new Libre3 CGM sensor Rx to pharmacy    Goal target premeal glucose 80- 150 mg/dl  We have discussed hypoglycemia management, use of glucose tabs or jelly bean snack  Needs repeat lab tests in 11/2023   Consider testing at PCP appt   Lab orders placed  Continue the losartan daily dose  Continue current simvastatin lipid medication   Advise having fasting lipid panel testing and dilated eye examination at least annually     Check with PCP regarding ideal iron supplement dosing  Addressed patient questions today    There are no Patient Instructions on file for this visit.    Future labs ordered today:   No orders of the defined types were placed in this encounter.    Radiology/Consults ordered today: None    Total time spent on day of encounter:  ***    Follow-up:  3/19/24 Return    TI Lowe MD, MS  Endocrinology  [facility]                        Recent issues:  Diabetes follow-up evaluation  Taking the Fiasp and Lantus insulin plan, also using the Freestyle Libre2 CGM sensor  Continues taking the Remicaid and (same) low dose Prednisone 2.5 mg daily  Recent PCP appt,  told of low iron levels  Now has new smartphone, plans to download Health Benefits [employer]2 merry soon          1972. Diagnosis of diabetes mellitus age 2 1/2, living in [facility]  Initial treatment with NPH and Regular insulin medications  Had seen Dr. Sarah Sierra/[facility]  Switched to MDI insulin plan using Nv and Levemir insulin     8/19/14. Initial consult with me at my former clinic ([facility])  8/20/14. CDE RD evaluation, also tried demo OmniPod pump  Used Novolog Echo 1/2U pen, with I:C carb counting method  Tried Lantus BID dosing, then Tresiba (expensive), then Basaglaru              Previously used Novolog insulin  5/2018. Switched to Fiasp 3/2018, then Fiasp non-formulary 2019  Meals typically brunch 10am and supper 9:30pm  Takes Prednisone for RA, current dose 3 mg QAM  Discussed hypoglycemia treatment options, jelly beans vs glucose tablets    Current DM meds:  Lantus Solostar                      12U each morning  Fiasp Flextouch                         Breakfast  1:12 (typically 6-7U)   Supper   1:12 (typically 6-10U)                                                  sscale 1U per 40>125                                                  target  mg/dl    Frequently takes Fiasp postmeal    Has InsulinCalculator smart phone merry, but not used  Using Accucheck Guide BG meter              Has tested up to 4x/day              Does own I:C and ISF calculations  9/2019. She has worn a free sample LibrePersonal continuous glucose sensor              Started Freestyle Mary CGM, then upgraded to Libre2 CGM with [place]    Recent Freestyle Libre2 data:          Previous  labs include:  Lab Results   Component Value Date    A1C 7.3 (H) 11/22/2023     11/22/2023    POTASSIUM 3.7 11/22/2023    CHLORIDE 105 11/22/2023    CO2 28 11/22/2023    ANIONGAP 8 11/22/2023    GLC 71 11/22/2023    BUN 15.8 11/22/2023    CR 0.96 (H) 11/22/2023    GFRESTIMATED 70 11/22/2023    GFRESTBLACK 87 02/19/2021    FINESSE 9.2 11/22/2023     CHOL 126 11/22/2023    TRIG 71 11/22/2023    HDL 53 11/22/2023    LDL 59 11/22/2023    NHDL 73 11/22/2023    UCRR 160.0 11/22/2023    MICROL 28.1 11/22/2023    UMALCR 17.56 11/22/2023     Lab Results   Component Value Date    TSH 3.51 11/22/2023    T4 0.96 02/18/2011     No history of vascular disease.  Takes simvastatin for hyperlipidemia management.  DM Complications:              Retinopathy                          Previous proliferative DR and bilateral laser PC surgeries                          Had cataracts, retinal detachment repair OD, higher occular pressures                          Significant vision loss, needs print magnification                          Sees Fer Davison and LUCY Ahmadi/ophthalmology              Neuropathy:                          Decreased sensation in feet          Single, lives in [place], [place]; works at [employer] job at [employer] at [employer]  Exercises at [employer], enjoys exercise classes  Sees Dr. Humberto Melendez/[facility] for general medicine evaluations.  Also sees Dr. Blanco/[facility] Rheumatology      PMH/PSH:  Past Medical History:   Diagnosis Date     Diabetic retinopathy associated with type 1 diabetes mellitus (H)     proliferative DR and bilateral laser PC surgeries     Dry eyes      Hyperlipidemia LDL goal < 130      RA (rheumatoid arthritis) (H)     seeing rheumatologist.     Sjoegren syndrome     seeing rheumatologist.     Type 1 diabetes mellitus (H)     retinopathy, neuropathy     Visual impairment      Past Surgical History:   Procedure Laterality Date     [facility] NONSPECIFIC PROCEDURE  8/00/97    T & A     [facility] NONSPECIFIC PROCEDURE  5/00/99    L eye surgery     [facility] NONSPECIFIC PROCEDURE  4/10/01    R cataract + IOL     [facility] NONSPECIFIC PROCEDURE  2002    bilat vitrectomy     [facility] NONSPECIFIC PROCEDURE  7/04    R eye vitrectomy     [facility] NONSPECIFIC PROCEDURE  5/08    R vitrectomy & partial retinal detachment        Family Hx:  Family  History   Problem Relation Age of Onset     Arthritis Mother         on celebrex,alive & healthy 2002     Breast Cancer Mother      Syncope Mother      Arthritis Father         on celebrex(2002)     Heart Disease Father         heart attack 1990, 60 years old (2002)     Diabetes Father      Cerebrovascular Disease Father         age 70     Thyroid Disease Sister         on thyroid med for couple of years , 35 yrs now(2002)     Cancer Paternal Grandmother         STOMACH     Cerebrovascular Disease Paternal Grandfather         age 90     Breast Cancer Cousin 55     Colorectal Cancer Cousin      Breast Cancer Other      Colorectal Cancer Other          Social Hx:  Social History     Socioeconomic History     Marital status: Single     Spouse name:      Number of children: 0     Years of education: Not on file     Highest education level: Not on file   Occupational History     Occupation: [employer]     Employer: [employer] [place]   Tobacco Use     Smoking status: Never     Smokeless tobacco: Never   Substance and Sexual Activity     Alcohol use: Yes     Comment: 1 drink every 3 weeks     Drug use: No     Sexual activity: Not Currently     Partners: Male     Comment:    Other Topics Concern     Parent/sibling w/ CABG, MI or angioplasty before 65F 55M? Not Asked   Social History Narrative     Not on file     Social Determinants of Health     Financial Resource Strain: Not on file   Food Insecurity: Not on file   Transportation Needs: Not on file   Physical Activity: Not on file   Stress: Not on file   Social Connections: Not on file   Interpersonal Safety: Low Risk  (11/22/2023)    Interpersonal Safety      Do you feel physically and emotionally safe where you currently live?: Yes      Within the past 12 months, have you been hit, slapped, kicked or otherwise physically hurt by someone?: No      Within the past 12 months, have you been humiliated or emotionally abused in other ways by your partner  "or ex-partner?: No   Housing Stability: Not on file          MEDICATIONS:  has a current medication list which includes the following prescription(s): brimonidine, freestyle thea 2 sensor, restasis, dorzolamide-timolol, ferrous sulfate, infliximab, insulin aspart, lantus solostar, latanoprost, losartan, methotrexate, fish oil triple strength, one daily multiple vitamin, prednisone, sulfasalazine, valacyclovir, blood glucose, blood glucose, blood glucose monitoring, blood glucose, continuous blood glucose monitoring, diclofenac, folic acid, bd adelfo u/f, and simvastatin.        ROS: 10 point ROS neg other than the symptoms noted above in the HPI.     GENERAL: some fatigue; weight stable; denies fevers, chills, night sweats.   HEENT: minimal/no vision from right eye, dry eyes/mouth; no dysphagia, odonophagia, diplopia  THYROID:  no apparent hyper or hypothyroid symptoms  CV: no chest pain, pressure, palpitations  LUNGS: no SOB, FUENTES, cough, wheezing   ABDOMEN: rare morning nausea; no diarrhea, constipation, abdominal pain  EXTREMITIES: no rashes, ulcers, edema  NEUROLOGY: decreased sensation at feet; no headaches, denies changes in vision, tingling, extremitiy numbness   MSK: mild hand tendinitis pains; no muscle aches, weakness  SKIN: scalp hair thinning; denies rashes or lesions/foot sores  :  no menses since stopping OCP medication 1/2021  PSYCH:  stable mood, no significant anxiety or depression  ENDOCRINE: intermittent hot flashes    Physical Exam   VS: BP (!) 147/84   Pulse 84   Ht 1.613 m (5' 3.5\")   Wt 51.9 kg (114 lb 6.4 oz)   LMP 11/20/2023   BMI 19.95 kg/m     CONSTITUTIONAL: healthy, alert and NAD, well dressed, answering questions appropriately  ENT: no nose swelling or nasal discharge, mouth redness or gum changes.  EYES: eyes grossly normal to inspection, conjunctivae and sclerae normal, no exophthalmos or proptosis  THYROID:  no apparent nodules or goiter  LUNGS: no audible wheeze, cough or " visible cyanosis, no visible retractions or increased work of breathing  ABDOMEN: abdomen normal size  EXTREMITIES: slowed flexion with left middle finger; no hand tremors, limited exam  NEUROLOGY: CN grossly intact, mentation intact and speech normal   SKIN:  no apparent skin lesions, rash, or edema with visualized skin appearance  PSYCH: mentation appears normal, affect normal/bright, judgement and insight intact,   normal speech and appearance well groomed    LABS:     All pertinent notes, labs, and images personally reviewed by me.        A/P:  Encounter Diagnoses   Name Primary?     Type 1 diabetes mellitus with proliferative retinopathy, macular edema presence unspecified, unspecified laterality, unspecified proliferative retinopathy type (H) Yes     Type 1 diabetes mellitus with diabetic neuropathy (H)      Comments:  Reviewed the diabetes and health history  Recent SG trends indicate higher afternoon postprandial glucose levels  Reviewed and interpreted tests that I previously ordered.   Ordered appropriate tests for the endocrinology disease management.    Management options discussed and implemented after shared medical decision making with the patient.  T1DM problem is chronic-stable    Plan:  Discussed general issues with the diabetes diagnosis and managemen  We discussed the hgbA1c test which reflects previous overall glucose levels or control  Discussed the importance of blood glucose (BG) testing to assess glucose trends  Discussed use of the Accu-check Guide BG meter  Provided general overview of the multiple daily injection (MDI) plan using rapid acting mealtime               and longacting insulin medications  Reviewed recent Freestyle Libre2 CGM glucose sensor trend data, in detail      Recommend:  Continue current Fiasp mealtime & Lantus insulin MDI plan  Lantus Solostar                      11U each morning  Fiasp Flextouch                         Breakfast  1:12    Supper   1:11                                                    sscale 1U per 40>125                                                  target  mg/dl    Lower breakfast Fiasp dose by 1U on workdays to avoid workplace hypoglycemia  Change dosing routine for meals with predictable quantity (Such as morning muffin or breakfast sandwich), dose Fiasp premeal  Consider using the InsulinCalculator [employer] merry as insulin dose calculator, discussed  If premeal sensor glucose at low end of range, reduce the Fiasp dose by 1-2 units  Continue use of the Freestyle sensor, download the Libre2 and Libre3 apps   Discussed use of the [employer] merry for Mary use   Will send new Libre3 CGM sensor Rx to pharmacy  Goal target premeal glucose 80- 150 mg/dl  We have discussed hypoglycemia management, use of glucose tabs or jelly bean snack  Needs repeat lab tests in 3/2024   Lab orders placed  Continue the losartan daily dose  Continue current simvastatin lipid medication   Advise having fasting lipid panel testing and dilated eye examination at least annually     Check with PCP regarding ideal iron supplement dosing  Addressed patient questions today    There are no Patient Instructions on file for this visit.    Future labs ordered today:   Orders Placed This Encounter   Procedures     GLUCOSE MONITOR, 72 HOUR, PHYS INTERP     Hemoglobin A1c     Basic metabolic panel     TSH with free T4 reflex     Radiology/Consults ordered today: None    Total time spent on day of encounter:  23 min    Follow-up:  3/19/24 Return    TI Lowe MD, MS  Endocrinology  [facility]    CC:  CHUY Melendez                      Again, thank you for allowing me to participate in the care of your patient.        Sincerely,        Saurabh Lowe MD

## 2024-01-18 ENCOUNTER — MYC MEDICAL ADVICE (OUTPATIENT)
Dept: ENDOCRINOLOGY | Facility: CLINIC | Age: 54
End: 2024-01-18
Payer: COMMERCIAL

## 2024-01-18 DIAGNOSIS — E10.3599 TYPE 1 DIABETES MELLITUS WITH PROLIFERATIVE RETINOPATHY, MACULAR EDEMA PRESENCE UNSPECIFIED, UNSPECIFIED LATERALITY, UNSPECIFIED PROLIFERATIVE RETINOPATHY TYPE (H): Primary | ICD-10-CM

## 2024-01-18 RX ORDER — KETOROLAC TROMETHAMINE 30 MG/ML
INJECTION, SOLUTION INTRAMUSCULAR; INTRAVENOUS
Qty: 1 EACH | Refills: 0 | Status: CANCELLED | OUTPATIENT
Start: 2024-01-18

## 2024-01-19 RX ORDER — KETOROLAC TROMETHAMINE 30 MG/ML
1 INJECTION, SOLUTION INTRAMUSCULAR; INTRAVENOUS CONTINUOUS PRN
Qty: 1 EACH | Refills: 0 | Status: SHIPPED | OUTPATIENT
Start: 2024-01-19

## 2024-02-27 ENCOUNTER — TRANSFERRED RECORDS (OUTPATIENT)
Dept: HEALTH INFORMATION MANAGEMENT | Facility: CLINIC | Age: 54
End: 2024-02-27
Payer: COMMERCIAL

## 2024-02-27 LAB
ALT SERPL-CCNC: 34 IU/L (ref 5–35)
AST SERPL-CCNC: 3.9 U/L (ref 5–34)
CREATININE (EXTERNAL): 1.1 MG/DL (ref 0.5–1.3)
GFR ESTIMATED (EXTERNAL): 55.2 ML/MIN/1.73M2
HEP C HIM: NORMAL

## 2024-03-19 ENCOUNTER — OFFICE VISIT (OUTPATIENT)
Dept: ENDOCRINOLOGY | Facility: CLINIC | Age: 54
End: 2024-03-19
Payer: COMMERCIAL

## 2024-03-19 VITALS
DIASTOLIC BLOOD PRESSURE: 84 MMHG | BODY MASS INDEX: 20.05 KG/M2 | WEIGHT: 115 LBS | SYSTOLIC BLOOD PRESSURE: 173 MMHG | HEART RATE: 83 BPM

## 2024-03-19 DIAGNOSIS — E10.3599 TYPE 1 DIABETES MELLITUS WITH PROLIFERATIVE RETINOPATHY, MACULAR EDEMA PRESENCE UNSPECIFIED, UNSPECIFIED LATERALITY, UNSPECIFIED PROLIFERATIVE RETINOPATHY TYPE (H): Primary | ICD-10-CM

## 2024-03-19 DIAGNOSIS — E10.40 TYPE 1 DIABETES MELLITUS WITH DIABETIC NEUROPATHY (H): ICD-10-CM

## 2024-03-19 PROCEDURE — 95251 CONT GLUC MNTR ANALYSIS I&R: CPT | Performed by: INTERNAL MEDICINE

## 2024-03-19 PROCEDURE — G2211 COMPLEX E/M VISIT ADD ON: HCPCS | Performed by: INTERNAL MEDICINE

## 2024-03-19 PROCEDURE — 99213 OFFICE O/P EST LOW 20 MIN: CPT | Performed by: INTERNAL MEDICINE

## 2024-03-19 NOTE — PROGRESS NOTES
Recent issues:  Diabetes follow-up evaluation  Taking the Fiasp and Lantus insulin plan, also using the Freestyle Libre2 CGM sensor  Continues taking the Remicaid and (same) low dose Prednisone 2.5 mg daily  She had COVID-19 illness in 12/2023, better now          1972. Diagnosis of diabetes mellitus age 2 1/2, living in [facility]  Initial treatment with NPH and Regular insulin medications  Had seen Dr. Sarah Sierra/[facility]  Switched to MDI insulin plan using Nv and Levemir insulin     8/19/14. Initial consult with me at my former clinic ([facility])  8/20/14. CDE RD evaluation, also tried demo OmniPod pump  Used Novolog Echo 1/2U pen, with I:C carb counting method  Tried Lantus BID dosing, then Tresiba (expensive), then Basaglaru              Previously used Novolog insulin  5/2018. Switched to Fiasp 3/2018, then Fiasp non-formulary 2019  Meals typically brunch 10am and supper 9:30pm  Takes Prednisone for RA, current dose 3 mg QAM  Discussed hypoglycemia treatment options, jelly beans vs glucose tablets    Current DM meds:  Lantus Solostar                      11U each morning  Fiasp Flextouch                         Breakfast  1:12 (typically 6-7U)   Supper   1:12 (typically 6-10U)                                                  sscale 1U per 40>125                                                  target  mg/dl    Frequently takes Fiasp postmeal    Has InsulinCalculator smart phone merry, but not used  Using Accucheck Guide BG meter              Has tested up to 4x/day              Does own I:C and ISF calculations  9/2019. She has worn a free sample LibrePersonal continuous glucose sensor              Started Freestyle Mary CGM, then upgraded to Libre2 CGM with [employer]    Recent Freestyle Libre3 data:            Previous [facility] labs include:  Lab Results   Component Value Date    A1C 7.3 (H) 11/22/2023     11/22/2023    POTASSIUM 3.7 11/22/2023    CHLORIDE 105 11/22/2023    CO2 28 11/22/2023     ANIONGAP 8 11/22/2023    GLC 71 11/22/2023    BUN 15.8 11/22/2023    CR 0.96 (H) 11/22/2023    GFRESTIMATED 70 11/22/2023    GFRESTBLACK 87 02/19/2021    FINESSE 9.2 11/22/2023    CHOL 126 11/22/2023    TRIG 71 11/22/2023    HDL 53 11/22/2023    LDL 59 11/22/2023    NHDL 73 11/22/2023    UCRR 160.0 11/22/2023    MICROL 28.1 11/22/2023    UMALCR 17.56 11/22/2023     Lab Results   Component Value Date    TSH 3.51 11/22/2023    T4 0.96 02/18/2011     No history of vascular disease.  Takes simvastatin for hyperlipidemia management.  DM Complications:              Retinopathy                          Previous proliferative DR and bilateral laser PC surgeries                          Had cataracts, retinal detachment repair OD, higher occular pressures                          Significant vision loss, needs print magnification                          Sees Fer Davison and LUCY Ahmadi/ophthalmology              Neuropathy:                          Decreased sensation in feet          Single, lives in [place], [place]; works at [employer] at [email or web address]  Exercises at [employer], enjoys exercise classes  Sees Dr. Humberto Melendez/[facility] for general medicine evaluations.  Also sees Dr. Blanco/[facility] Rheumatology      PMH/PSH:  Past Medical History:   Diagnosis Date    Diabetic retinopathy associated with type 1 diabetes mellitus (H)     proliferative DR and bilateral laser PC surgeries    Dry eyes     Hyperlipidemia LDL goal < 130     RA (rheumatoid arthritis) (H)     seeing rheumatologist.    Sjoegren syndrome     seeing rheumatologist.    Type 1 diabetes mellitus (H)     retinopathy, neuropathy    Visual impairment      Past Surgical History:   Procedure Laterality Date    ZZ NONSPECIFIC PROCEDURE  8/00/97    T & A    ZZC NONSPECIFIC PROCEDURE  5/00/99    L eye surgery    ZZC NONSPECIFIC PROCEDURE  4/10/01    R cataract + IOL    Z NONSPECIFIC PROCEDURE  2002    bilat vitrectomy     Z NONSPECIFIC PROCEDURE  7/04    R eye vitrectomy    ZZC NONSPECIFIC PROCEDURE  5/08    R vitrectomy & partial retinal detachment        Family Hx:  Family History   Problem Relation Age of Onset    Arthritis Mother         on celebrex,alive & healthy 2002    Breast Cancer Mother     Syncope Mother     Arthritis Father         on celebrex(2002)    Heart Disease Father         heart attack 1990, 60 years old (2002)    Diabetes Father     Cerebrovascular Disease Father         age 70    Thyroid Disease Sister         on thyroid med for couple of years , 35 yrs now(2002)    Cancer Paternal Grandmother         STOMACH    Cerebrovascular Disease Paternal Grandfather         age 90    Breast Cancer Cousin 55    Colorectal Cancer Cousin     Breast Cancer Other     Colorectal Cancer Other          Social Hx:  Social History     Socioeconomic History    Marital status: Single     Spouse name:     Number of children: 0    Years of education: Not on file    Highest education level: Not on file   Occupational History    Occupation: [employer]     Employer: [employer]   Tobacco Use    Smoking status: Never    Smokeless tobacco: Never   Substance and Sexual Activity    Alcohol use: Yes     Comment: 1 drink every 3 weeks    Drug use: No    Sexual activity: Not Currently     Partners: Male     Comment:    Other Topics Concern    Parent/sibling w/ CABG, MI or angioplasty before 65F 55M? Not Asked   Social History Narrative    Not on file     Social Determinants of Health     Financial Resource Strain: Not on file   Food Insecurity: Not on file   Transportation Needs: Not on file   Physical Activity: Not on file   Stress: Not on file   Social Connections: Not on file   Interpersonal Safety: Low Risk  (11/22/2023)    Interpersonal Safety     Do you feel physically and emotionally safe where you currently live?: Yes     Within the past 12 months, have you been hit, slapped, kicked or otherwise physically  hurt by someone?: No     Within the past 12 months, have you been humiliated or emotionally abused in other ways by your partner or ex-partner?: No   Housing Stability: Not on file          MEDICATIONS:  has a current medication list which includes the following prescription(s): brimonidine, freestyle thea 3 reader, freestyle thea 3 sensor, restasis, dorzolamide-timolol, ferrous sulfate, folic acid, infliximab, insulin aspart, lantus solostar, latanoprost, losartan, methotrexate, fish oil triple strength, one daily multiple vitamin, prednisone, simvastatin, sulfasalazine, valacyclovir, blood glucose, blood glucose, blood glucose monitoring, blood glucose, freestyle thea 2 sensor, continuous blood glucose monitoring, diclofenac, and bd adelfo u/f.        ROS: 10 point ROS neg other than the symptoms noted above in the HPI.     GENERAL: some fatigue; weight stable; denies fevers, chills, night sweats.   HEENT: minimal/no vision from right eye, dry eyes/mouth; no dysphagia, odonophagia, diplopia  THYROID:  no apparent hyper or hypothyroid symptoms  CV: no chest pain, pressure, palpitations  LUNGS: no SOB, FUENTES, cough, wheezing   ABDOMEN: rare morning nausea; no diarrhea, constipation, abdominal pain  EXTREMITIES: no rashes, ulcers, edema  NEUROLOGY: decreased sensation at feet; no headaches, denies changes in vision, tingling, extremitiy numbness   MSK: mild hand tendinitis pains; no muscle aches, weakness  SKIN: scalp hair thinning; denies rashes or lesions/foot sores  :  no menses since stopping OCP medication 1/2021  PSYCH:  stable mood, no significant anxiety or depression  ENDOCRINE: intermittent hot flashes    Physical Exam   VS: BP (!) 173/84   Pulse 83   Wt 52.2 kg (115 lb)   BMI 20.05 kg/m     CONSTITUTIONAL: healthy, alert and NAD, well dressed, answering questions appropriately  ENT: no nose swelling or nasal discharge, mouth redness or gum changes.  EYES: eyes grossly normal to inspection, conjunctivae  and sclerae normal, no exophthalmos or proptosis  THYROID:  no apparent nodules or goiter  LUNGS: no audible wheeze, cough or visible cyanosis, no visible retractions or increased work of breathing  ABDOMEN: abdomen normal size  EXTREMITIES: slowed flexion with left middle finger; no hand tremors, limited exam  NEUROLOGY: CN grossly intact, mentation intact and speech normal   SKIN:  no apparent skin lesions, rash, or edema with visualized skin appearance  PSYCH: mentation appears normal, affect normal/bright, judgement and insight intact,   normal speech and appearance well groomed    LABS:     All pertinent notes, labs, and images personally reviewed by me.        A/P:  Encounter Diagnoses   Name Primary?    Type 1 diabetes mellitus with proliferative retinopathy, macular edema presence unspecified, unspecified laterality, unspecified proliferative retinopathy type (H) Yes    Type 1 diabetes mellitus with diabetic neuropathy (H)        Comments:  Reviewed the diabetes and health history  Recent SG trends indicate higher afternoon postprandial glucose levels  Reviewed and interpreted tests that I previously ordered.   Ordered appropriate tests for the endocrinology disease management.    Management options discussed and implemented after shared medical decision making with the patient.  T1DM problem is chronic-stable    Plan:  Discussed general issues with the diabetes diagnosis and managemen  We discussed the hgbA1c test which reflects previous overall glucose levels or control  Discussed the importance of blood glucose (BG) testing to assess glucose trends  Discussed use of the Accu-check Guide BG meter  Provided general overview of the multiple daily injection (MDI) plan using rapid acting mealtime               and longacting insulin medications  Reviewed recent Freestyle Libre2 CGM glucose sensor trend data, in detail      Recommend:  Continue current Fiasp mealtime & Lantus insulin MDI plan  Reviewed  importance of taking the Fiasp injection dose at start of the meals and snacks, not postmeal  Lower breakfast Fiasp dose by 1U on workdays to avoid workplace hypoglycemia  Consider using the InsulinCalculator [employer] merry as insulin dose calculator  If premeal sensor glucose at low end of range, reduce the Fiasp dose by 1-2 units  Goal target premeal glucose 80- 150 mg/dl  Continue use of Freestyle Libre3 CGM  Reviewed option of Omnipod pump   Encouraged her to try a demo pod to abdomen... she agreed   Demo pod placed to her RLQ abdomen today for short trial experience (w/o pod insulin)  We have discussed hypoglycemia management, use of glucose tabs or jelly bean snack  Needs repeat lab tests preappt 6/18/24   Lab orders placed  Continue the losartan daily dose  Continue current simvastatin lipid medication   Advise having fasting lipid panel testing and dilated eye examination at least annually     Addressed patient questions today    The longitudinal plan of care for the endocrine problem(s) were addressed during this visit.  Due to added complexity of care,   we will continue to support the patient and the subsequent management of this condition with ongoing continuity of care.    There are no Patient Instructions on file for this visit.    Future labs ordered today:   Orders Placed This Encounter   Procedures    GLUCOSE MONITOR, 72 HOUR, PHYS INTERP    Hemoglobin A1c    Basic metabolic panel    ALT     Radiology/Consults ordered today: None    Total time spent on day of encounter:  22 min    Follow-up:  6/18/24 at 9:45am Return,    9/17/24 at 11:30 am    TI Lowe MD, MS  Endocrinology  [facility]    CC:  CHUY Melendez

## 2024-04-02 DIAGNOSIS — E10.3599 TYPE 1 DIABETES MELLITUS WITH PROLIFERATIVE RETINOPATHY, MACULAR EDEMA PRESENCE UNSPECIFIED, UNSPECIFIED LATERALITY, UNSPECIFIED PROLIFERATIVE RETINOPATHY TYPE (H): ICD-10-CM

## 2024-04-03 ENCOUNTER — TELEPHONE (OUTPATIENT)
Dept: ENDOCRINOLOGY | Facility: CLINIC | Age: 54
End: 2024-04-03
Payer: COMMERCIAL

## 2024-04-03 RX ORDER — INSULIN ASPART INJECTION 100 [IU]/ML
INJECTION, SOLUTION SUBCUTANEOUS
Qty: 15 ML | Refills: 3 | Status: SHIPPED | OUTPATIENT
Start: 2024-04-03

## 2024-04-03 NOTE — TELEPHONE ENCOUNTER
PA Initiation    Medication: FIASP FLEXTOUCH 100 UNIT/ML SC SOPN  Insurance Company: [employer] [place] - Phone [phone] Fax [phone]  Pharmacy Filling the Rx: [facility] #[id number] [place], [place] - [facility] AT [facility] [address]  Filling Pharmacy Phone:    Filling Pharmacy Fax:    Start Date: 4/3/2024    Key: [id number]

## 2024-04-04 NOTE — TELEPHONE ENCOUNTER
Prior Authorization Approval    Medication: FIASP FLEXTOUCH 100 UNIT/ML SC SOPN  Authorization Effective Date: 1/5/2024  Authorization Expiration Date: 4/4/2025  Approved Dose/Quantity: 1 month  Reference #: Key: [id number]   Insurance Company: [employer] [place] - Phone [phone] Fax [phone]  Expected CoPay: $ 25  CoPay Card Available: No    Financial Assistance Needed: no  Which Pharmacy is filling the prescription: [id number] [address] AT [facility] [address]  Pharmacy Notified: yes  Patient Notified: yes

## 2024-04-15 ENCOUNTER — TELEPHONE (OUTPATIENT)
Dept: INTERNAL MEDICINE | Facility: CLINIC | Age: 54
End: 2024-04-15
Payer: COMMERCIAL

## 2024-04-15 NOTE — TELEPHONE ENCOUNTER
Patient Quality Outreach    Patient is due for the following:   Colon Cancer Screening    Next Steps:   Patient declined colonoscopy. PCP ordered FIT test    Type of outreach:    Sent letter.    Next Steps:  Reach out within 90 days via Letter.    Max number of attempts reached: Yes. Will try again in 90 days if patient still on fail list.    Questions for provider review:    None           Sadia Varma MA  Chart routed to none.

## 2024-04-29 ENCOUNTER — LAB REQUISITION (OUTPATIENT)
Dept: LAB | Facility: CLINIC | Age: 54
End: 2024-04-29

## 2024-04-29 DIAGNOSIS — Z12.4 ENCOUNTER FOR SCREENING FOR MALIGNANT NEOPLASM OF CERVIX: ICD-10-CM

## 2024-04-29 PROCEDURE — 87624 HPV HI-RISK TYP POOLED RSLT: CPT | Performed by: NURSE PRACTITIONER

## 2024-04-29 PROCEDURE — G0145 SCR C/V CYTO,THINLAYER,RESCR: HCPCS | Performed by: NURSE PRACTITIONER

## 2024-05-03 LAB
BKR LAB AP GYN ADEQUACY: NORMAL
BKR LAB AP GYN INTERPRETATION: NORMAL
BKR LAB AP HPV REFLEX: NORMAL
BKR LAB AP LMP: NORMAL
BKR LAB AP PREVIOUS ABNL DX: NORMAL
BKR LAB AP PREVIOUS ABNORMAL: NORMAL
PATH REPORT.COMMENTS IMP SPEC: NORMAL
PATH REPORT.COMMENTS IMP SPEC: NORMAL
PATH REPORT.RELEVANT HX SPEC: NORMAL

## 2024-05-07 LAB
HUMAN PAPILLOMA VIRUS 16 DNA: NEGATIVE
HUMAN PAPILLOMA VIRUS 18 DNA: NEGATIVE
HUMAN PAPILLOMA VIRUS FINAL DIAGNOSIS: NORMAL
HUMAN PAPILLOMA VIRUS OTHER HR: NEGATIVE

## 2024-05-24 ENCOUNTER — TRANSFERRED RECORDS (OUTPATIENT)
Dept: HEALTH INFORMATION MANAGEMENT | Facility: CLINIC | Age: 54
End: 2024-05-24
Payer: COMMERCIAL

## 2024-05-24 LAB — RETINOPATHY: POSITIVE

## 2024-06-01 ENCOUNTER — HEALTH MAINTENANCE LETTER (OUTPATIENT)
Age: 54
End: 2024-06-01

## 2024-06-18 ENCOUNTER — LAB (OUTPATIENT)
Dept: LAB | Facility: CLINIC | Age: 54
End: 2024-06-18
Payer: COMMERCIAL

## 2024-06-18 ENCOUNTER — OFFICE VISIT (OUTPATIENT)
Dept: ENDOCRINOLOGY | Facility: CLINIC | Age: 54
End: 2024-06-18
Payer: COMMERCIAL

## 2024-06-18 VITALS
HEART RATE: 77 BPM | DIASTOLIC BLOOD PRESSURE: 78 MMHG | BODY MASS INDEX: 19.53 KG/M2 | WEIGHT: 112 LBS | SYSTOLIC BLOOD PRESSURE: 153 MMHG

## 2024-06-18 DIAGNOSIS — E10.40 TYPE 1 DIABETES MELLITUS WITH DIABETIC NEUROPATHY (H): ICD-10-CM

## 2024-06-18 DIAGNOSIS — E10.3599 TYPE 1 DIABETES MELLITUS WITH PROLIFERATIVE RETINOPATHY, MACULAR EDEMA PRESENCE UNSPECIFIED, UNSPECIFIED LATERALITY, UNSPECIFIED PROLIFERATIVE RETINOPATHY TYPE (H): ICD-10-CM

## 2024-06-18 DIAGNOSIS — E10.3599 TYPE 1 DIABETES MELLITUS WITH PROLIFERATIVE RETINOPATHY, MACULAR EDEMA PRESENCE UNSPECIFIED, UNSPECIFIED LATERALITY, UNSPECIFIED PROLIFERATIVE RETINOPATHY TYPE (H): Primary | ICD-10-CM

## 2024-06-18 LAB
ALT SERPL W P-5'-P-CCNC: 24 U/L (ref 0–50)
ANION GAP SERPL CALCULATED.3IONS-SCNC: 10 MMOL/L (ref 7–15)
BUN SERPL-MCNC: 24.9 MG/DL (ref 6–20)
CALCIUM SERPL-MCNC: 9.4 MG/DL (ref 8.6–10)
CHLORIDE SERPL-SCNC: 102 MMOL/L (ref 98–107)
CREAT SERPL-MCNC: 1.04 MG/DL (ref 0.51–0.95)
DEPRECATED HCO3 PLAS-SCNC: 29 MMOL/L (ref 22–29)
EGFRCR SERPLBLD CKD-EPI 2021: 64 ML/MIN/1.73M2
GLUCOSE SERPL-MCNC: 101 MG/DL (ref 70–99)
HBA1C MFR BLD: 7.9 % (ref 0–5.6)
POTASSIUM SERPL-SCNC: 3.8 MMOL/L (ref 3.4–5.3)
SODIUM SERPL-SCNC: 141 MMOL/L (ref 135–145)
TSH SERPL DL<=0.005 MIU/L-ACNC: 3.95 UIU/ML (ref 0.3–4.2)

## 2024-06-18 PROCEDURE — 83036 HEMOGLOBIN GLYCOSYLATED A1C: CPT

## 2024-06-18 PROCEDURE — G2211 COMPLEX E/M VISIT ADD ON: HCPCS | Performed by: INTERNAL MEDICINE

## 2024-06-18 PROCEDURE — 84460 ALANINE AMINO (ALT) (SGPT): CPT

## 2024-06-18 PROCEDURE — 99214 OFFICE O/P EST MOD 30 MIN: CPT | Performed by: INTERNAL MEDICINE

## 2024-06-18 PROCEDURE — 36415 COLL VENOUS BLD VENIPUNCTURE: CPT

## 2024-06-18 PROCEDURE — 84443 ASSAY THYROID STIM HORMONE: CPT

## 2024-06-18 PROCEDURE — 95251 CONT GLUC MNTR ANALYSIS I&R: CPT | Performed by: INTERNAL MEDICINE

## 2024-06-18 PROCEDURE — 80048 BASIC METABOLIC PNL TOTAL CA: CPT

## 2024-06-18 RX ORDER — INSULIN GLARGINE 100 [IU]/ML
11-13 INJECTION, SOLUTION SUBCUTANEOUS EVERY MORNING
Qty: 15 ML | Refills: 5 | Status: SHIPPED | OUTPATIENT
Start: 2024-06-18

## 2024-06-18 RX ORDER — PEN NEEDLE, DIABETIC 32GX 5/32"
NEEDLE, DISPOSABLE MISCELLANEOUS
Qty: 400 EACH | Refills: 3 | Status: SHIPPED | OUTPATIENT
Start: 2024-06-18

## 2024-06-18 NOTE — PROGRESS NOTES
Recent issues:  Diabetes follow-up evaluation  Taking the Fiasp and Lantus insulin plan, also using the Freestyle Libre3 CGM sensor  Continues taking the Remicaid and (same) low dose Prednisone 2.5 mg daily  New job, now works at [employer] and also [employer] [place] (possibly [employer] in future)          1972. Diagnosis of diabetes mellitus age 2 1/2, living in [facility]  Initial treatment with NPH and Regular insulin medications  Had seen Dr. Sarah Sierra/[facility]  Switched to MDI insulin plan using Nv and Levemir insulin     8/19/14. Initial consult with me at my former clinic ([facility])  8/20/14. CDE RD evaluation, also tried demo OmniPod pump  Used Novolog Echo 1/2U pen, with I:C carb counting method  Tried Lantus BID dosing, then Tresiba (expensive), then Basaglaru              Previously used Novolog insulin  5/2018. Switched to Fiasp 3/2018, then Fiasp non-formulary 2019  Meals typically brunch 10am and supper 9:30pm  Takes Prednisone for RA, current dose 3 mg QAM  Discussed hypoglycemia treatment options, jelly beans vs glucose tablets    Current DM meds:  Lantus Solostar                      12U each morning  Fiasp Flextouch                         Breakfast  1:12 (typically 6-7U)   Supper   1:12 (typically 6-10U)                                                  sscale 1U per 40>125                                                  target  mg/dl    Frequently takes Fiasp postmeal    Has InsulinCalculator smart phone merry, but not used  Using Accucheck Guide BG meter              Has tested up to 4x/day              Does own I:C and ISF calculations  9/2019. She has worn a free sample LibrePersonal continuous glucose sensor              Started Freestyle Mary CGM, then upgraded to Libre2 CGM with [place]    Recent Freestyle Libre3 data:        Previous [facility] hgbA1c trends include:  Lab Results   Component Value Date    A1C 7.9 (H) 06/18/2024    A1C 7.3 (H) 11/22/2023     A1C 7.3 (H) 04/27/2023    A1C 7.9 (H) 09/22/2022    A1C 7.7 (H) 02/23/2022        Recent [facility] labs include:  Lab Results   Component Value Date    A1C 7.9 (H) 06/18/2024     06/18/2024    POTASSIUM 3.8 06/18/2024    CHLORIDE 102 06/18/2024    CO2 29 06/18/2024    ANIONGAP 10 06/18/2024     (H) 06/18/2024    BUN 24.9 (H) 06/18/2024    CR 1.04 (H) 06/18/2024    GFRESTIMATED 64 06/18/2024    GFRESTBLACK 87 02/19/2021    FINESSE 9.4 06/18/2024    CHOL 126 11/22/2023    TRIG 71 11/22/2023    HDL 53 11/22/2023    LDL 59 11/22/2023    NHDL 73 11/22/2023    UCRR 160.0 11/22/2023    MICROL 28.1 11/22/2023    UMALCR 17.56 11/22/2023     Lab Results   Component Value Date    TSH 3.95 06/18/2024    T4 0.96 02/18/2011     No history of vascular disease.  Takes simvastatin for hyperlipidemia management.  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 Humberto Melendez/[facility] for general medicine evaluations.  Also sees Dr. Blanco/[facility] Rheumatology      PMH/PSH:  Past Medical History:   Diagnosis Date    Diabetic retinopathy associated with type 1 diabetes mellitus (H)     proliferative DR and bilateral laser PC surgeries    Dry eyes     Hyperlipidemia LDL goal < 130     RA (rheumatoid arthritis) (H)     seeing rheumatologist.    Sjoegren syndrome     seeing rheumatologist.    Type 1 diabetes mellitus (H)     retinopathy, neuropathy    Visual  impairment      Past Surgical History:   Procedure Laterality Date    [facility] NONSPECIFIC PROCEDURE  8/00/97    T & A    [facility] NONSPECIFIC PROCEDURE  5/00/99    L eye surgery    ZC NONSPECIFIC PROCEDURE  4/10/01    R cataract + IOL    Z NONSPECIFIC PROCEDURE  2002    bilat vitrectomy    Z NONSPECIFIC PROCEDURE  7/04    R eye vitrectomy    ZZC NONSPECIFIC PROCEDURE  5/08    R vitrectomy & partial retinal detachment        Family Hx:  Family History   Problem Relation Age of Onset    Arthritis Mother         on celebrex,alive & healthy 2002    Breast Cancer Mother     Syncope Mother     Arthritis Father         on celebrex(2002)    Heart Disease Father         heart attack 1990, 60 years old (2002)    Diabetes Father     Cerebrovascular Disease Father         age 70    Thyroid Disease Sister         on thyroid med for couple of years , 35 yrs now(2002)    Cancer Paternal Grandmother         STOMACH    Cerebrovascular Disease Paternal Grandfather         age 90    Breast Cancer Cousin 55    Colorectal Cancer Cousin     Breast Cancer Other     Colorectal Cancer Other          Social Hx:  Social History     Socioeconomic History    Marital status: Single     Spouse name:     Number of children: 0    Years of education: Not on file    Highest education level: Not on file   Occupational History    Occupation: [employer]     Employer: [employer]   Tobacco Use    Smoking status: Never    Smokeless tobacco: Never   Substance and Sexual Activity    Alcohol use: Yes     Comment: 1 drink every 3 weeks    Drug use: No    Sexual activity: Not Currently     Partners: Male     Comment:    Other Topics Concern    Parent/sibling w/ CABG, MI or angioplasty before 65F 55M? Not Asked   Social History Narrative    Not on file     Social Determinants of Health     Financial Resource Strain: Not on file   Food Insecurity: Not on file   Transportation Needs: Not on file   Physical Activity: Not on file   Stress:  Not on file   Social Connections: Not on file   Interpersonal Safety: Low Risk  (11/22/2023)    Interpersonal Safety     Do you feel physically and emotionally safe where you currently live?: Yes     Within the past 12 months, have you been hit, slapped, kicked or otherwise physically hurt by someone?: No     Within the past 12 months, have you been humiliated or emotionally abused in other ways by your partner or ex-partner?: No   Housing Stability: Not on file          MEDICATIONS:  has a current medication list which includes the following prescription(s): brimonidine, freestyle thea 3 reader, freestyle thea 3 sensor, restasis, diclofenac, dorzolamide-timolol, ferrous sulfate, folic acid, infliximab, insulin aspart, lantus solostar, bd adelfo u/f, latanoprost, losartan, methotrexate, fish oil triple strength, one daily multiple vitamin, prednisone, simvastatin, sulfasalazine, valacyclovir, blood glucose, blood glucose, blood glucose monitoring, and blood glucose.        ROS: 10 point ROS neg other than the symptoms noted above in the HPI.     GENERAL: some fatigue; weight stable; denies fevers, chills, night sweats.   HEENT: minimal/no vision from right eye, dry eyes/mouth; no dysphagia, odonophagia, diplopia  THYROID:  no apparent hyper or hypothyroid symptoms  CV: no chest pain, pressure, palpitations  LUNGS: no SOB, FUENTES, cough, wheezing   ABDOMEN: rare morning nausea; no diarrhea, constipation, abdominal pain  EXTREMITIES: no rashes, ulcers, edema  NEUROLOGY: decreased sensation at feet; no headaches, denies changes in vision, tingling, extremitiy numbness   MSK: mild hand tendinitis pains; no muscle aches, weakness  SKIN: scalp hair thinning; denies rashes or lesions/foot sores  :  no menses since stopping OCP medication 1/2021  PSYCH:  stable mood, no significant anxiety or depression  ENDOCRINE: intermittent hot flashes    Physical Exam   VS: BP (!) 153/78   Pulse 77   Wt 50.8 kg (112 lb)   BMI 19.53  kg/m     CONSTITUTIONAL: healthy, alert and NAD, well dressed, answering questions appropriately  ENT: no nose swelling or nasal discharge, mouth redness or gum changes.  EYES: eyes grossly normal to inspection, conjunctivae and sclerae normal, no exophthalmos or proptosis  THYROID:  no apparent nodules or goiter  LUNGS: no audible wheeze, cough or visible cyanosis, no visible retractions or increased work of breathing  ABDOMEN: abdomen normal size  EXTREMITIES: slowed flexion with left middle finger; no hand tremors, limited exam  NEUROLOGY: CN grossly intact, mentation intact and speech normal   SKIN:  no apparent skin lesions, rash, or edema with visualized skin appearance  PSYCH: mentation appears normal, affect normal/bright, judgement and insight intact,   normal speech and appearance well groomed    LABS:     All pertinent notes, labs, and images personally reviewed by me.        A/P:  Encounter Diagnoses   Name Primary?    Type 1 diabetes mellitus with proliferative retinopathy, macular edema presence unspecified, unspecified laterality, unspecified proliferative retinopathy type (H) Yes    Type 1 diabetes mellitus with diabetic neuropathy (H)      Comments:  Reviewed the diabetes and health history  Recent SG trends indicate higher afternoon postprandial glucose levels  Reviewed and interpreted tests that I previously ordered.   Ordered appropriate tests for the endocrinology disease management.    Management options discussed and implemented after shared medical decision making with the patient.  T1DM problem is chronic-stable    Plan:  Discussed general issues with the diabetes diagnosis and managemen  We discussed the hgbA1c test which reflects previous overall glucose levels or control  Discussed the importance of blood glucose (BG) testing to assess glucose trends  Discussed use of the Accu-check Guide BG meter  Provided general overview of the multiple daily injection (MDI) plan using rapid acting  mealtime               and longacting insulin medications  Reviewed recent Freestyle Libre3 CGM glucose sensor trend data, in detail      Recommend:  Continue current Fiasp mealtime & Lantus insulin medications:  Lantus Solostar                      13U each morning  Fiasp Flextouch                         Breakfast  1:12 (typically 6-7U)   Supper   1:12 (typically 6-10U)                                                  sscale 1U per 40>125                                                  target  mg/dl    Sent updated Lantus Solostar pen and Fiasp pen Rx's  Reviewed importance of taking the Fiasp injection dose at start of the meals and snacks, not postmeal  Consider using the InsulinCalculator [employer] merry as insulin dose calculator  Lower breakfast Fiasp dose by 1U on workdays to avoid workplace hypoglycemia  If premeal sensor glucose at low end of range, reduce the Fiasp dose by 1-2 units  Goal target premeal glucose 80- 150 mg/dl  Continue use of Freestyle Libre3 CGM  We have discussed hypoglycemia management, use of glucose tabs or jelly bean snack, Omnipod pump option  No labs ordered at this time  Continue the losartan daily dose  Continue current simvastatin lipid medication   Advise having fasting lipid panel testing and dilated eye examination at least annually     Addressed patient questions today    The longitudinal plan of care for the endocrine problem(s) were addressed during this visit.  Due to added complexity of care,   we will continue to support the patient and the subsequent management of this condition with ongoing continuity of care.    There are no Patient Instructions on file for this visit.    Future labs ordered today:   Orders Placed This Encounter   Procedures    GLUCOSE MONITOR, 72 HOUR, PHYS INTERP     Radiology/Consults ordered today: None    Total time spent on day of encounter:  20 min    Follow-up:  10/10/24 at 11am, Return    TI Lowe MD, MS  Endocrinology  [facility]  [place]    CC:  CHUY Melendez

## 2024-07-30 ENCOUNTER — TRANSFERRED RECORDS (OUTPATIENT)
Dept: HEALTH INFORMATION MANAGEMENT | Facility: CLINIC | Age: 54
End: 2024-07-30
Payer: COMMERCIAL

## 2024-07-30 LAB
ALT SERPL-CCNC: 21 IU/L (ref 6–32)
AST SERPL-CCNC: 26 U/L (ref 10–35)

## 2024-08-02 ENCOUNTER — MYC REFILL (OUTPATIENT)
Dept: ENDOCRINOLOGY | Facility: CLINIC | Age: 54
End: 2024-08-02
Payer: COMMERCIAL

## 2024-08-02 DIAGNOSIS — E10.40 TYPE 1 DIABETES MELLITUS WITH DIABETIC NEUROPATHY (H): ICD-10-CM

## 2024-08-02 DIAGNOSIS — E10.3599 TYPE 1 DIABETES MELLITUS WITH PROLIFERATIVE RETINOPATHY, MACULAR EDEMA PRESENCE UNSPECIFIED, UNSPECIFIED LATERALITY, UNSPECIFIED PROLIFERATIVE RETINOPATHY TYPE (H): ICD-10-CM

## 2024-08-02 RX ORDER — BLOOD-GLUCOSE SENSOR
1 EACH MISCELLANEOUS CONTINUOUS PRN
Qty: 2 EACH | Refills: 5 | Status: SHIPPED | OUTPATIENT
Start: 2024-08-02 | End: 2024-10-04 | Stop reason: ALTCHOICE

## 2024-08-02 RX ORDER — BLOOD-GLUCOSE SENSOR
EACH MISCELLANEOUS
Refills: 5 | OUTPATIENT
Start: 2024-08-02

## 2024-08-02 NOTE — TELEPHONE ENCOUNTER
Requested Prescriptions   Pending Prescriptions Disp Refills    Continuous Glucose Sensor (FREESTYLE LOUIS 3 SENSOR) MISC 2 each 5     Si Device continuous prn (change every 14 days)       There is no refill protocol information for this order        Last Written Prescription Date:  23  Last Fill Quantity: 2 each ,  # refills: 5   Last office visit: 2024 ; last virtual visit: Visit date not found with prescribing provider:  Dr Lowe   Future Office Visit:  10/10/24    Refill sent  Gregg Parker RN

## 2024-08-02 NOTE — TELEPHONE ENCOUNTER
Requested Prescriptions   Pending Prescriptions Disp Refills    Continuous Glucose Sensor (FREESTYLE LOUIS 3 SENSOR) MISC [Pharmacy Med Name: FREESTYLE LOUIS 3 SENSOR  MISC]  5     Sig: CHANGE EVERY 14 DAYS       There is no refill protocol information for this order        Duplicate request. Rx declined.    Gregg Parker RN

## 2024-10-04 ENCOUNTER — OFFICE VISIT (OUTPATIENT)
Dept: ENDOCRINOLOGY | Facility: CLINIC | Age: 54
End: 2024-10-04
Payer: COMMERCIAL

## 2024-10-04 VITALS
HEART RATE: 93 BPM | BODY MASS INDEX: 19.88 KG/M2 | DIASTOLIC BLOOD PRESSURE: 77 MMHG | WEIGHT: 114 LBS | SYSTOLIC BLOOD PRESSURE: 148 MMHG

## 2024-10-04 DIAGNOSIS — E10.40 TYPE 1 DIABETES MELLITUS WITH DIABETIC NEUROPATHY (H): ICD-10-CM

## 2024-10-04 DIAGNOSIS — E10.3599 TYPE 1 DIABETES MELLITUS WITH PROLIFERATIVE RETINOPATHY, MACULAR EDEMA PRESENCE UNSPECIFIED, UNSPECIFIED LATERALITY, UNSPECIFIED PROLIFERATIVE RETINOPATHY TYPE (H): Primary | ICD-10-CM

## 2024-10-04 PROCEDURE — 95251 CONT GLUC MNTR ANALYSIS I&R: CPT | Performed by: INTERNAL MEDICINE

## 2024-10-04 PROCEDURE — 99214 OFFICE O/P EST MOD 30 MIN: CPT | Performed by: INTERNAL MEDICINE

## 2024-10-04 PROCEDURE — G2211 COMPLEX E/M VISIT ADD ON: HCPCS | Performed by: INTERNAL MEDICINE

## 2024-10-04 RX ORDER — HYDROCHLOROTHIAZIDE 12.5 MG/1
CAPSULE ORAL
Qty: 6 EACH | Refills: 1 | Status: SHIPPED | OUTPATIENT
Start: 2024-10-04

## 2024-10-04 NOTE — PROGRESS NOTES
Recent issues:  Diabetes follow-up evaluation  Taking the Fiasp and Lantus insulin plan, also using the Freestyle Libre3 CGM sensor  Continues taking the Remicaid and (same) low dose Prednisone 2.5 mg daily          1972. Diagnosis of diabetes mellitus age 2 1/2, living in [facility]  Initial treatment with NPH and Regular insulin medications  Had seen Dr. Sarah Sierra/[facility]  Switched to MDI insulin plan using Nv and Levemir insulin     8/19/14. Initial consult with me at my former clinic ([facility])  8/20/14. CDE RD evaluation, also tried demo OmniPod pump  Used Novolog Echo 1/2U pen, with I:C carb counting method  Tried Lantus BID dosing, then Tresiba (expensive), then Basaglaru              Previously used Novolog insulin  5/2018. Switched to Fiasp 3/2018, then Fiasp non-formulary 2019  Meals typically brunch 10am and supper 9:30pm  Takes Prednisone for RA, current dose 3 mg QAM  Discussed hypoglycemia treatment options, jelly beans vs glucose tablets  Current DM meds:  Lantus Solostar                      13U each morning  Fiasp Flextouch                         Breakfast  1:12 (typically 6-7U)   Supper   1:12 (typically 6-10U)                                                  sscale 1U per 40>125                                                  target  mg/dl    Frequently takes Fiasp postmeal    Has InsulinCalculator smart phone merry, but not used  Using Accucheck Guide BG meter              Has tested up to 4x/day              Does own I:C and ISF calculations  9/2019. She has worn a free sample LibrePersonal continuous glucose sensor              Started Freestyle Mary CGM, then upgraded to Libre2 CGM with [place]  Recent Freestyle Libre3 data:          Previous [facility] hgbA1c trends include:  Lab Results   Component Value Date    A1C 7.9 (H) 06/18/2024    A1C 7.3 (H) 11/22/2023    A1C 7.3 (H) 04/27/2023    A1C 7.9 (H) 09/22/2022    A1C 7.7 (H) 02/23/2022        Recent [facility] labs include:  Lab  Results   Component Value Date    A1C 7.9 (H) 06/18/2024     06/18/2024    POTASSIUM 3.8 06/18/2024    CHLORIDE 102 06/18/2024    CO2 29 06/18/2024    ANIONGAP 10 06/18/2024     (H) 06/18/2024    BUN 24.9 (H) 06/18/2024    CR 1.04 (H) 06/18/2024    GFRESTIMATED 64 06/18/2024    GFRESTBLACK 87 02/19/2021    FINESSE 9.4 06/18/2024    CHOL 126 11/22/2023    TRIG 71 11/22/2023    HDL 53 11/22/2023    LDL 59 11/22/2023    NHDL 73 11/22/2023    UCRR 160.0 11/22/2023    MICROL 28.1 11/22/2023    UMALCR 17.56 11/22/2023     Lab Results   Component Value Date    TSH 3.95 06/18/2024    T4 0.96 02/18/2011     No history of vascular disease.  Takes simvastatin for hyperlipidemia management.  DM Complications:              Retinopathy                          Previous proliferative DR and bilateral laser PC surgeries                          Had cataracts, retinal detachment repair OD, higher occular pressures                          Significant vision loss, needs print magnification                          Sees Fer Davison and LUCY Ahmadi/ophthalmology              Neuropathy:                          Decreased sensation in feet          Single, lives in [place], [place]; now works part time at [employer] [place] and [employer]  Exercises at [employer] [place] vs [place], enjoys exercise classes  Sees Dr. Humberto Melendez/[facility] for general medicine evaluations.  Also sees Dr. Blanco/[facility]      PMH/PSH:  Past Medical History:   Diagnosis Date    Diabetic retinopathy associated with type 1 diabetes mellitus (H)     proliferative DR and bilateral laser PC surgeries    Dry eyes     Hyperlipidemia LDL goal < 130     RA (rheumatoid arthritis) (H)     seeing rheumatologist.    Sjoegren syndrome     seeing rheumatologist.    Type 1 diabetes mellitus (H)     retinopathy, neuropathy    Visual impairment      Past Surgical History:   Procedure Laterality Date    [facility] NONSPECIFIC PROCEDURE  8/00/97    T & A    [facility]  NONSPECIFIC PROCEDURE  5/00/99    L eye surgery    Z NONSPECIFIC PROCEDURE  4/10/01    R cataract + IOL    Z NONSPECIFIC PROCEDURE  2002    bilat vitrectomy    Z NONSPECIFIC PROCEDURE  7/04    R eye vitrectomy    Z NONSPECIFIC PROCEDURE  5/08    R vitrectomy & partial retinal detachment        Family Hx:  Family History   Problem Relation Age of Onset    Arthritis Mother         on celebrex,alive & healthy 2002    Breast Cancer Mother     Syncope Mother     Arthritis Father         on celebrex(2002)    Heart Disease Father         heart attack 1990, 60 years old (2002)    Diabetes Father     Cerebrovascular Disease Father         age 70    Thyroid Disease Sister         on thyroid med for couple of years , 35 yrs now(2002)    Cancer Paternal Grandmother         STOMACH    Cerebrovascular Disease Paternal Grandfather         age 90    Breast Cancer Cousin 55    Colorectal Cancer Cousin     Breast Cancer Other     Colorectal Cancer Other          Social Hx:  Social History     Socioeconomic History    Marital status: Single     Spouse name:     Number of children: 0    Years of education: Not on file    Highest education level: Not on file   Occupational History    Occupation: [employer]     Employer: [employer]   Tobacco Use    Smoking status: Never    Smokeless tobacco: Never   Substance and Sexual Activity    Alcohol use: Yes     Comment: 1 drink every 3 weeks    Drug use: No    Sexual activity: Not Currently     Partners: Male     Comment:    Other Topics Concern    Parent/sibling w/ CABG, MI or angioplasty before 65F 55M? Not Asked   Social History Narrative    Not on file     Social Determinants of Health     Financial Resource Strain: Not on file   Food Insecurity: Not on file   Transportation Needs: Not on file   Physical Activity: Not on file   Stress: Not on file   Social Connections: Not on file   Interpersonal Safety: Low Risk  (11/22/2023)    Interpersonal Safety      Do you feel physically and emotionally safe where you currently live?: Yes     Within the past 12 months, have you been hit, slapped, kicked or otherwise physically hurt by someone?: No     Within the past 12 months, have you been humiliated or emotionally abused in other ways by your partner or ex-partner?: No   Housing Stability: Not on file          MEDICATIONS:  has a current medication list which includes the following prescription(s): brimonidine, freestyle thea 3 reader, freestyle thea 3 plus sensor, restasis, diclofenac, dorzolamide-timolol, ferrous sulfate, folic acid, infliximab, insulin aspart, lantus solostar, latanoprost, losartan, methotrexate, fish oil triple strength, one daily multiple vitamin, prednisone, simvastatin, sulfasalazine, valacyclovir, blood glucose, blood glucose, blood glucose monitoring, blood glucose, and bd adelfo u/f.        ROS: 10 point ROS neg other than the symptoms noted above in the HPI.     GENERAL: some fatigue; weight stable; denies fevers, chills, night sweats.   HEENT: minimal/no vision from right eye, dry eyes/mouth; no dysphagia, odonophagia, diplopia  THYROID:  no apparent hyper or hypothyroid symptoms  CV: no chest pain, pressure, palpitations  LUNGS: no SOB, FUENTES, cough, wheezing   ABDOMEN: rare morning nausea; no diarrhea, constipation, abdominal pain  EXTREMITIES: no rashes, ulcers, edema  NEUROLOGY: decreased sensation at feet; no headaches, denies changes in vision, tingling, extremitiy numbness   MSK: mild hand tendinitis pains; no muscle aches, weakness  SKIN: scalp hair thinning; denies rashes or lesions/foot sores  :  no menses since stopping OCP medication 1/2021  PSYCH:  stable mood, no significant anxiety or depression  ENDOCRINE: intermittent hot flashes    Physical Exam   VS: BP (!) 148/77   Pulse 93   Wt 51.7 kg (114 lb)   BMI 19.88 kg/m     CONSTITUTIONAL: healthy, alert and NAD, well dressed, answering questions appropriately  ENT: no nose  swelling or nasal discharge, mouth redness or gum changes.  EYES: eyes grossly normal to inspection, conjunctivae and sclerae normal, no exophthalmos or proptosis  THYROID:  no apparent nodules or goiter  LUNGS: no audible wheeze, cough or visible cyanosis, no visible retractions or increased work of breathing  ABDOMEN: abdomen normal size  EXTREMITIES: normal appearance of feet, pulses R/L 3/3, slowed flexion with left middle finger; no hand tremors  NEUROLOGY: CN grossly intact, mentation intact and speech normal   SKIN:  no apparent skin lesions, rash, or edema with visualized skin appearance  PSYCH: mentation appears normal, affect normal/bright, judgement and insight intact,   normal speech and appearance well groomed    LABS:     All pertinent notes, labs, and images personally reviewed by me.        A/P:  Encounter Diagnoses   Name Primary?    Type 1 diabetes mellitus with proliferative retinopathy, macular edema presence unspecified, unspecified laterality, unspecified proliferative retinopathy type (H) Yes    Type 1 diabetes mellitus with diabetic neuropathy (H)        Comments:  Reviewed the complicated diabetes and health history  Recent SG trends indicate higher afternoon postprandial glucose levels  Reviewed and interpreted tests that I previously ordered.   Ordered appropriate tests for the endocrinology disease management.    Management options discussed and implemented after shared medical decision making with the patient.  T1DM problem is chronic-stable    Plan:  Discussed general issues with the diabetes diagnosis and managemen  We discussed the hgbA1c test which reflects previous overall glucose levels or control  Discussed the importance of blood glucose (BG) testing to assess glucose trends  Discussed use of the Accu-check Guide BG meter  Provided general overview of the multiple daily injection (MDI) plan using rapid acting mealtime               and longacting insulin medications  Reviewed  recent Freestyle Libre3 CGM glucose sensor trend data, in detail      Recommend:  Continue current Fiasp mealtime & Lantus insulin medications:  Reviewed importance of taking the Fiasp injection dose at start of the meals and snacks, not postmeal  Consider using the InsulinCalculator Ticies merry as insulin dose calculator  Lower breakfast Fiasp dose by 1U on workdays to avoid workplace hypoglycemia  If premeal sensor glucose at low end of range, reduce the Fiasp dose by 1-2 units  Goal target premeal glucose 80- 150 mg/dl  Continue use of Freestyle Mary CGM but change to Libre3 Plus, [facility]    We have discussed hypoglycemia management, use of glucose tabs or jelly bean snack, Omnipod pump option  Lab tests at Fall 2024 PCP appointment   Testing at [facility]   Lab orders placed  Continue the losartan daily dose  Continue current simvastatin lipid medication   Advise having fasting lipid panel testing and dilated eye examination at least annually     Patient to follow up with their Primary Care Provider regarding the elevated blood pressure.  Addressed patient questions today    The longitudinal plan of care for the endocrine problem(s) were addressed during this visit.  Due to added complexity of care,   we will continue to support the patient and the subsequent management of this condition with ongoing continuity of care.    There are no Patient Instructions on file for this visit.    Future labs ordered today: No orders of the defined types were placed in this encounter.    Radiology/Consults ordered today: None    Total time spent on day of encounter:  26 min    Follow-up:  1/28/25 at 12p Return,    4/29/25 at 10:30am, Return    TI Lowe MD, MS  Endocrinology  [facility]    CC:  CHUY Melendez

## 2024-10-28 ENCOUNTER — TELEPHONE (OUTPATIENT)
Dept: INTERNAL MEDICINE | Facility: CLINIC | Age: 54
End: 2024-10-28
Payer: COMMERCIAL

## 2024-10-28 NOTE — TELEPHONE ENCOUNTER
Reason for Call:  Appointment Request    Patient requesting this type of appt:  patient called and would like an appt today or tomorrow early AM at either [facility] or [place]; [place]; [place];     Reason:  Pre op physical for left eye pressure.    Surgery tomorrow afternoon.    Please contact patient today.  Thank you.    Requested provider: Calos Melendez    Reason patient unable to be scheduled: Needs to be scheduled by clinic    When does patient want to be seen/preferred time: 1-2 days    Comments: na    Could we send this information to you in [employer] or would you prefer to receive a phone call?:   Patient would prefer a phone call   Okay to leave a detailed message?: Yes at Cell number on file:    Telephone Information:   Mobile [phone]       Call taken on 10/28/2024 at 2:08 PM by Bianca Winn

## 2024-10-30 ENCOUNTER — PATIENT OUTREACH (OUTPATIENT)
Dept: CARE COORDINATION | Facility: CLINIC | Age: 54
End: 2024-10-30
Payer: COMMERCIAL

## 2024-11-25 ENCOUNTER — TELEPHONE (OUTPATIENT)
Dept: INTERNAL MEDICINE | Facility: CLINIC | Age: 54
End: 2024-11-25
Payer: COMMERCIAL

## 2024-11-25 DIAGNOSIS — E10.3599 TYPE 1 DIABETES MELLITUS WITH PROLIFERATIVE RETINOPATHY, MACULAR EDEMA PRESENCE UNSPECIFIED, UNSPECIFIED LATERALITY, UNSPECIFIED PROLIFERATIVE RETINOPATHY TYPE (H): ICD-10-CM

## 2024-11-25 DIAGNOSIS — I10 PRIMARY HYPERTENSION: ICD-10-CM

## 2024-11-25 DIAGNOSIS — E78.5 HYPERLIPIDEMIA LDL GOAL <100: ICD-10-CM

## 2024-11-25 RX ORDER — SIMVASTATIN 40 MG
TABLET ORAL
Qty: 30 TABLET | Refills: 0 | Status: SHIPPED | OUTPATIENT
Start: 2024-11-25

## 2024-11-25 RX ORDER — LOSARTAN POTASSIUM 25 MG/1
25 TABLET ORAL DAILY
Qty: 30 TABLET | Refills: 0 | Status: SHIPPED | OUTPATIENT
Start: 2024-11-25

## 2024-11-26 ENCOUNTER — TRANSFERRED RECORDS (OUTPATIENT)
Dept: HEALTH INFORMATION MANAGEMENT | Facility: CLINIC | Age: 54
End: 2024-11-26
Payer: COMMERCIAL

## 2024-11-26 DIAGNOSIS — E78.5 HYPERLIPIDEMIA LDL GOAL <100: ICD-10-CM

## 2024-11-26 DIAGNOSIS — E10.3599 TYPE 1 DIABETES MELLITUS WITH PROLIFERATIVE RETINOPATHY, MACULAR EDEMA PRESENCE UNSPECIFIED, UNSPECIFIED LATERALITY, UNSPECIFIED PROLIFERATIVE RETINOPATHY TYPE (H): ICD-10-CM

## 2024-11-26 DIAGNOSIS — I10 PRIMARY HYPERTENSION: ICD-10-CM

## 2024-11-26 RX ORDER — SIMVASTATIN 40 MG
TABLET ORAL
Qty: 90 TABLET | OUTPATIENT
Start: 2024-11-26

## 2024-11-26 RX ORDER — LOSARTAN POTASSIUM 25 MG/1
25 TABLET ORAL DAILY
Qty: 90 TABLET | OUTPATIENT
Start: 2024-11-26

## 2024-11-26 NOTE — TELEPHONE ENCOUNTER
I have not seen this pt since more than 1 yr. Pt needs in clinic appt. Okay to schedule on any of my hold slot/ same-day or approval only slot

## 2024-11-26 NOTE — TELEPHONE ENCOUNTER
Last OV - 11/22/2023 with Dr. Melendez.    Call to patient to get appointment set up.    Dec 12, 2024 10:00 AM  (Arrive by 9:40 AM)  Adult Preventative Visit with Calos Melendez MD  [facility] ([facility] ) [phone]     Thank you,  Abdoulaye, Triage RN [facility]    11:48 AM 11/26/2024

## 2024-12-12 ENCOUNTER — OFFICE VISIT (OUTPATIENT)
Dept: INTERNAL MEDICINE | Facility: CLINIC | Age: 54
End: 2024-12-12
Payer: COMMERCIAL

## 2024-12-12 VITALS
BODY MASS INDEX: 19.1 KG/M2 | HEART RATE: 97 BPM | TEMPERATURE: 97.3 F | WEIGHT: 111.9 LBS | SYSTOLIC BLOOD PRESSURE: 142 MMHG | HEIGHT: 64 IN | OXYGEN SATURATION: 100 % | DIASTOLIC BLOOD PRESSURE: 72 MMHG | RESPIRATION RATE: 15 BRPM

## 2024-12-12 DIAGNOSIS — Z12.31 VISIT FOR SCREENING MAMMOGRAM: ICD-10-CM

## 2024-12-12 DIAGNOSIS — E10.40 TYPE 1 DIABETES MELLITUS WITH DIABETIC NEUROPATHY (H): ICD-10-CM

## 2024-12-12 DIAGNOSIS — E78.5 HYPERLIPIDEMIA LDL GOAL <100: ICD-10-CM

## 2024-12-12 DIAGNOSIS — D64.9 LOW HEMOGLOBIN: ICD-10-CM

## 2024-12-12 DIAGNOSIS — Z12.11 SCREEN FOR COLON CANCER: Primary | ICD-10-CM

## 2024-12-12 DIAGNOSIS — I10 PRIMARY HYPERTENSION: ICD-10-CM

## 2024-12-12 LAB
ERYTHROCYTE [DISTWIDTH] IN BLOOD BY AUTOMATED COUNT: 11.7 % (ref 10–15)
EST. AVERAGE GLUCOSE BLD GHB EST-MCNC: 183 MG/DL
HBA1C MFR BLD: 8 % (ref 0–5.6)
HCT VFR BLD AUTO: 33.2 % (ref 35–47)
HGB BLD-MCNC: 11.4 G/DL (ref 11.7–15.7)
MCH RBC QN AUTO: 35.8 PG (ref 26.5–33)
MCHC RBC AUTO-ENTMCNC: 34.3 G/DL (ref 31.5–36.5)
MCV RBC AUTO: 104 FL (ref 78–100)
PLATELET # BLD AUTO: 282 10E3/UL (ref 150–450)
RBC # BLD AUTO: 3.18 10E6/UL (ref 3.8–5.2)
WBC # BLD AUTO: 8.3 10E3/UL (ref 4–11)

## 2024-12-12 RX ORDER — SIMVASTATIN 40 MG
TABLET ORAL
Qty: 90 TABLET | Refills: 1 | Status: SHIPPED | OUTPATIENT
Start: 2024-12-12

## 2024-12-12 RX ORDER — LOSARTAN POTASSIUM 25 MG/1
37.5 TABLET ORAL DAILY
Qty: 135 TABLET | Refills: 0 | Status: SHIPPED | OUTPATIENT
Start: 2024-12-12

## 2024-12-12 NOTE — PROGRESS NOTES
Assessment & Plan       (I10) Primary hypertension  Comment: High systolic blood pressure  Plan: Increase losartan (COZAAR) 25 MG tablet to 1-1/2 tablet daily for a total of 37.5 mg daily.  Continue to follow low-sodium diet, regular exercise follow-up in clinic in 2 months.  Check  Comprehensive metabolic panel,       (E78.5) Hyperlipidemia LDL goal <100  Plan: Lipid panel reflex to direct LDL Non-fasting,         simvastatin (ZOCOR) 40 MG tablet refilled.explained clearly about the medication,insructions and side effects.           (Z12.31) Visit for screening mammogram  Plan: MA Screening Bilateral w/ Rosalio            (D64.9) Low hemoglobin  Plan: CBC with platelets.pt was told I will contact her after results and proceed accordingly.      (Z12.11) Screen for colon cancer    Plan:  Patient declines colonoscopy, check fecal colorectal cancer screen (FIT)              Shena Bhat is a 54 year old, presenting for the following health issues:  Lipids, Diabetes, and Hypertension      12/12/2024     9:55 AM   Additional Questions   Roomed by ezio   Accompanied by self         12/12/2024     9:55 AM   Patient Reported Additional Medications   Patient reports taking the following new medications none     History of Present Illness       Hyperlipidemia:  She presents for follow up of hyperlipidemia.   She is taking medication to lower cholesterol. She is not having myalgia or other side effects to statin medications.    Hypertension: She presents for follow up of hypertension.  She does not check blood pressure  regularly outside of the clinic. Outpatient blood pressures have not been over 140/90. She does not follow a low salt diet.     Reason for visit:  Renew medications, get flu and Covid shots    She eats 2-3 servings of fruits and vegetables daily.She consumes 1 sweetened beverage(s) daily.She exercises with enough effort to increase her heart rate 9 or less minutes per day.  She exercises with enough effort  to increase her heart rate 3 or less days per week.   She is taking medications regularly.         Hyperlipidemia Follow-Up    Are you regularly taking any medication or supplement to lower your cholesterol?   Yes- simvastatin   Are you having muscle aches or other side effects that you think could be caused by your cholesterol lowering medication?  No    Hypertension Follow-up    Do you check your blood pressure regularly outside of the clinic? No   Are you following a low salt diet? Yes  Are your blood pressures ever more than 140 on the top number (systolic) OR more   than 90 on the bottom number (diastolic), for example 140/90? Yes    Patient states that she has history of low hemoglobin and would like her hemoglobin checked today, currently taking over-the-counter.  iron supplement once a day.    History of diabetes type 1: Follows up with endocrinologist     History of rheumatoid arthritis and Sjogren's: Follows up with the rheumatologist      Past Medical History:   Diagnosis Date    Diabetic retinopathy associated with type 1 diabetes mellitus (H)     proliferative DR and bilateral laser PC surgeries    Dry eyes     Hyperlipidemia LDL goal < 130     RA (rheumatoid arthritis) (H)     seeing rheumatologist.    Sjoegren syndrome     seeing rheumatologist.    Type 1 diabetes mellitus (H)     retinopathy, neuropathy    Visual impairment        Current Outpatient Medications   Medication Sig Dispense Refill    blood glucose (EDWIN CONTOUR) test strip 1 strip by In Vitro route 4 times daily (before meals and nightly) Checks 4-5 x daily 400 strip 3    blood glucose (NO BRAND SPECIFIED) test strip Use to test blood sugar 4 times daily or as directed, Accu-check Guide test strips, E10.9. 400 strip 0    blood glucose monitoring (NO BRAND SPECIFIED) meter device kit Use to test blood sugar 5 times daily or as directed, Accu-check Guide meter. 1 kit 1    brimonidine (ALPHAGAN) 0.2 % ophthalmic solution INT 1 GTT IN OU  BID  3    Continuous Blood Gluc  (FREESTYLE LOUIS 3 READER) SHAR 1 Device continuous prn (use for continuous glucose testing) 1 each 0    Continuous Glucose Sensor (FREESTYLE LOUIS 3 PLUS SENSOR) MISC Use for continuous glucose measurements, change every 15 days. 6 each 1    cycloSPORINE (RESTASIS) 0.05 % ophthalmic emulsion Restasis 0.05 % eye drops in a dropperette   INT 1 GTT IN OU BID      diclofenac (VOLTAREN) 75 MG EC tablet TK 1 T PO BID  2    dorzolamide-timolol (COSOPT) 2-0.5 % ophthalmic solution INSTILL 1 GTT IN OU QAM  6    Ferrous Sulfate (IRON PO) Blood builder      FOLIC ACID PO Take 1 mg by mouth 2 times daily 500      InFLIXimab (REMICADE IV) Inject 500 mg into the vein Every 7 weeks      insulin aspart (FIASP FLEXTOUCH) 100 UNIT/ML pen-injector ADMINISTER 3 TO 8 UNITS UNDER THE SKIN THREE TIMES DAILY WITH MEALS. 15 mL 3    insulin glargine (LANTUS SOLOSTAR) 100 UNIT/ML pen Inject 11-13 Units Subcutaneous every morning 15 mL 5    insulin pen needle (BD ELIUD U/F) 32G X 4 MM miscellaneous Use 4 pen needles daily or as directed. 400 each 3    latanoprost (XALATAN) 0.005 % ophthalmic solution       losartan (COZAAR) 25 MG tablet Take 1.5 tablets (37.5 mg) by mouth daily. 135 tablet 0    Methotrexate Sodium (METHOTREXATE PO) Take by mouth once a week 5 tablets once a week      Omega-3 Fatty Acids (FISH OIL TRIPLE STRENGTH) 1400 MG CAPS Take 2 tablets by mouth daily. Omega 369 mg      ONE DAILY MULTIPLE VITAMIN OR TABS one daily  0    predniSONE (DELTASONE) 5 MG tablet 2.5 mg. 2 mg daily  0    simvastatin (ZOCOR) 40 MG tablet TAKE 1 TABLET BY MOUTH AT BEDTIME 90 tablet 1    sulfaSALAzine (AZULFIDINE) 500 MG tablet Take 500 mg by mouth 2 times daily 2 tablets twice daily      valACYclovir (VALTREX) 500 MG tablet Take 500 mg by mouth daily  14 tablet prn    blood glucose monitoring (NO BRAND SPECIFIED) test strip Use to test blood sugar 4 times daily or as directed, Accu-check Guide meter test  "strips (Patient not taking: Reported on 2/23/2022) 400 strip 3           Review of Systems  CONSTITUTIONAL: NEGATIVE for fever, chills,    RESP: NEGATIVE for significant cough or SOB  CV: NEGATIVE for chest pain, palpitations or peripheral edema  MUSCULOSKELETAL: NEGATIVE for significant arthralgias or myalgia  ENDOCRINE: NEGATIVE for temperature intolerance, skin/hair changes      Objective    BP (!) 142/72   Pulse 97   Temp 97.3  F (36.3  C) (Tympanic)   Resp 15   Ht 1.613 m (5' 3.5\")   Wt 50.8 kg (111 lb 14.4 oz)   SpO2 100%   BMI 19.51 kg/m    Body mass index is 19.51 kg/m .  Physical Exam   GENERAL: alert and no distress  RESP: lungs clear to auscultation - no rales, rhonchi or wheezes  CV: regular rate and rhythm, normal S1 S2, no  MS: no gross musculoskeletal defects noted, no edema  NEURO: Normal strength and tone, mentation intact and speech normal  PSYCH: mentation appears normal, affect normal/bright  Diabetic foot exam: normal DP and PT pulses, no trophic changes or ulcerative lesions, normal sensory exam, and normal monofilament exam          Signed Electronically by: Calos Melendez MD    "

## 2024-12-12 NOTE — NURSING NOTE
"BP (!) 144/74   Pulse 97   Temp 97.3  F (36.3  C) (Tympanic)   Resp 15   Ht 1.613 m (5' 3.5\")   Wt 50.8 kg (111 lb 14.4 oz)   SpO2 100%   BMI 19.51 kg/m        Prior to immunization administration, verified patients identity using patient s name and date of birth. Please see Immunization Activity for additional information.     Screening Questionnaire for Adult Immunization    Are you sick today?   No   Do you have allergies to medications, food, a vaccine component or latex?   No   Have you ever had a serious reaction after receiving a vaccination?   No   Do you have a long-term health problem with heart, lung, kidney, or metabolic disease (e.g., diabetes), asthma, a blood disorder, no spleen, complement component deficiency, a cochlear implant, or a spinal fluid leak?  Are you on long-term aspirin therapy?   No   Do you have cancer, leukemia, HIV/AIDS, or any other immune system problem?   No   Do you have a parent, brother, or sister with an immune system problem?   No   In the past 3 months, have you taken medications that affect  your immune system, such as prednisone, other steroids, or anticancer drugs; drugs for the treatment of rheumatoid arthritis, Crohn s disease, or psoriasis; or have you had radiation treatments?   No   Have you had a seizure, or a brain or other nervous system problem?   No   During the past year, have you received a transfusion of blood or blood    products, or been given immune (gamma) globulin or antiviral drug?   No   For women: Are you pregnant or is there a chance you could become       pregnant during the next month?   No   Have you received any vaccinations in the past 4 weeks?   No     Immunization questionnaire answers were all negative.      Patient instructed to remain in clinic for 15 minutes afterwards, and to report any adverse reactions.     Screening performed by Sadia Varma MA on 12/12/2024 at 1:56 PM.        "

## 2024-12-18 ENCOUNTER — HOSPITAL ENCOUNTER (OUTPATIENT)
Dept: MAMMOGRAPHY | Facility: CLINIC | Age: 54
Discharge: HOME | End: 2024-12-18
Attending: INTERNAL MEDICINE
Payer: COMMERCIAL

## 2024-12-18 DIAGNOSIS — Z12.31 VISIT FOR SCREENING MAMMOGRAM: ICD-10-CM

## 2024-12-18 PROCEDURE — 77063 BREAST TOMOSYNTHESIS BI: CPT

## 2024-12-18 PROCEDURE — 77067 SCR MAMMO BI INCL CAD: CPT

## 2025-02-25 ENCOUNTER — TRANSFERRED RECORDS (OUTPATIENT)
Dept: HEALTH INFORMATION MANAGEMENT | Facility: CLINIC | Age: 55
End: 2025-02-25
Payer: COMMERCIAL

## 2025-02-25 LAB
AST SERPL-CCNC: 39 U/L (ref 10–35)
CREATININE (EXTERNAL): 1 MG/DL (ref 0.5–1.05)

## 2025-03-31 ENCOUNTER — PATIENT OUTREACH (OUTPATIENT)
Dept: CARE COORDINATION | Facility: CLINIC | Age: 55
End: 2025-03-31
Payer: COMMERCIAL

## 2025-04-03 DIAGNOSIS — E10.3599 TYPE 1 DIABETES MELLITUS WITH PROLIFERATIVE RETINOPATHY, MACULAR EDEMA PRESENCE UNSPECIFIED, UNSPECIFIED LATERALITY, UNSPECIFIED PROLIFERATIVE RETINOPATHY TYPE (H): ICD-10-CM

## 2025-04-03 DIAGNOSIS — I10 PRIMARY HYPERTENSION: ICD-10-CM

## 2025-04-03 RX ORDER — LOSARTAN POTASSIUM 25 MG/1
37.5 TABLET ORAL DAILY
Qty: 24 TABLET | Refills: 0 | Status: SHIPPED | OUTPATIENT
Start: 2025-04-03

## 2025-04-03 RX ORDER — INSULIN ASPART INJECTION 100 [IU]/ML
INJECTION, SOLUTION SUBCUTANEOUS
Qty: 15 ML | Refills: 0 | Status: SHIPPED | OUTPATIENT
Start: 2025-04-03

## 2025-04-03 NOTE — TELEPHONE ENCOUNTER
Last Written Prescription Date:  4/3/24  Last Fill Quantity: 15,  # refills: 3   Last office visit: 10/4/2024 ; last virtual visit: Visit date not found with prescribing provider:  Dr. Lowe   Future Office Visit: 4/29/25    Requested Prescriptions   Pending Prescriptions Disp Refills    insulin aspart (FIASP FLEXTOUCH) 100 UNIT/ML pen-injector [Pharmacy Med Name: FIASP FLEXTOUCH PEN INJ 3ML] 15 mL 3     Sig: ADMINISTER 3 TO 8 UNITS UNDER THE SKIN THREE TIMES DAILY WITH MEALS.       There is no refill protocol information for this order        Refill sent  Chel Hall RN

## 2025-04-03 NOTE — TELEPHONE ENCOUNTER
Called and spoke with patient to relay provider message. Patient verbalizes understanding of instructions and indicates no further questions at this time. Patient will call back to set up nurse only appointment.     Thank you,  Abdoulaye, Triage PAOLA Dickinson    5:55 PM 4/3/2025

## 2025-04-03 NOTE — TELEPHONE ENCOUNTER
Last BP was elevated and losartan dose was increased and she was advised to follow up but did not follow up . med faxed for 2 weeks. please advise nurse only BP check in 1-2 weeks,     NO charge for pt.

## 2025-04-14 ENCOUNTER — PATIENT OUTREACH (OUTPATIENT)
Dept: CARE COORDINATION | Facility: CLINIC | Age: 55
End: 2025-04-14
Payer: COMMERCIAL

## 2025-04-29 ENCOUNTER — OFFICE VISIT (OUTPATIENT)
Dept: ENDOCRINOLOGY | Facility: CLINIC | Age: 55
End: 2025-04-29
Payer: COMMERCIAL

## 2025-04-29 VITALS
BODY MASS INDEX: 19.88 KG/M2 | SYSTOLIC BLOOD PRESSURE: 193 MMHG | WEIGHT: 114 LBS | DIASTOLIC BLOOD PRESSURE: 78 MMHG | HEART RATE: 88 BPM

## 2025-04-29 DIAGNOSIS — E10.3599 TYPE 1 DIABETES MELLITUS WITH PROLIFERATIVE RETINOPATHY, MACULAR EDEMA PRESENCE UNSPECIFIED, UNSPECIFIED LATERALITY, UNSPECIFIED PROLIFERATIVE RETINOPATHY TYPE (H): Primary | ICD-10-CM

## 2025-04-29 DIAGNOSIS — E10.40 TYPE 1 DIABETES MELLITUS WITH DIABETIC NEUROPATHY (H): ICD-10-CM

## 2025-04-29 PROCEDURE — G2211 COMPLEX E/M VISIT ADD ON: HCPCS | Performed by: INTERNAL MEDICINE

## 2025-04-29 PROCEDURE — 99214 OFFICE O/P EST MOD 30 MIN: CPT | Performed by: INTERNAL MEDICINE

## 2025-04-29 PROCEDURE — 95251 CONT GLUC MNTR ANALYSIS I&R: CPT | Performed by: INTERNAL MEDICINE

## 2025-04-29 RX ORDER — PEN NEEDLE, DIABETIC 32GX 5/32"
NEEDLE, DISPOSABLE MISCELLANEOUS
Qty: 400 EACH | Refills: 3 | Status: SHIPPED | OUTPATIENT
Start: 2025-04-29

## 2025-04-29 RX ORDER — HYDROCHLOROTHIAZIDE 12.5 MG/1
CAPSULE ORAL
Qty: 6 EACH | Refills: 1 | Status: SHIPPED | OUTPATIENT
Start: 2025-04-29

## 2025-04-29 RX ORDER — INSULIN ASPART INJECTION 100 [IU]/ML
INJECTION, SOLUTION SUBCUTANEOUS
Qty: 15 ML | Refills: 5 | Status: SHIPPED | OUTPATIENT
Start: 2025-04-29

## 2025-04-29 RX ORDER — INSULIN GLARGINE 100 [IU]/ML
11-13 INJECTION, SOLUTION SUBCUTANEOUS EVERY MORNING
Qty: 15 ML | Refills: 5 | Status: SHIPPED | OUTPATIENT
Start: 2025-04-29

## 2025-04-29 NOTE — PATIENT INSTRUCTIONS
Advise lowering the Lantus and softening the Fiasp carb ratio but take Fiasp pre-meal    Current DM medications:  Lantus Solostar..........12U each morning  Fiasp Flextouch                         Breakfast...........1:13 (typically 6-7U)   Supper...............1:13 (typically 6-10U)  ....................................sscale 1U per 40>125  ....................................target  mg/dl    Try to take the Fiasp at start of the meal  Continue use of the Libre3 Plus glucose sensor  Next endocrinology appointments:  - late 7/2025 with Lizeht Stevens CNP  - 11/6/25 with Dr. Lowe at 11:30 am

## 2025-04-29 NOTE — PROGRESS NOTES
Recent issues:  Diabetes follow-up evaluation  Taking the Fiasp and Lantus insulin plan, also using the Freestyle Libre3 Plus CGM sensor  Continues taking the Remicaid and (same) lower dose Prednisone 2 mg daily  Ran out of health insurance in 1/2025, then restarted insurance  Recent family stressors... deaths of family members: 2 cousins and also aunt            1972. Diagnosis of diabetes mellitus age 2 1/2, living in [facility]  Initial treatment with NPH and Regular insulin medications  Had seen Dr. Sarah Sierra/ Endocrinology  Switched to MDI insulin plan using Nv and Levemir insulin     8/19/14. Initial consult with me at my former clinic ([facility])  8/20/14. CDE RD evaluation, also tried demo OmniPod pump  Used Novolog Echo 1/2U pen, with I:C carb counting method  Tried Lantus BID dosing, then Tresiba (expensive), then Basaglaru              Previously used Novolog insulin  5/2018. Switched to Fiasp 3/2018, then Fiasp non-formulary 2019  Meals typically brunch 10am and supper 9:30pm  Takes Prednisone for RA, current dose 3 mg QAM  Discussed hypoglycemia treatment options, jelly beans vs glucose tablets  Current DM medications:  Lantus Solostar                      13U each morning  Fiasp Flextouch                         Breakfast  1:12 (typically 6-7U)   Supper   1:12 (typically 6-10U)                                                  sscale 1U per 40>125                                                  target  mg/dl    Frequently takes Fiasp postmeal    Has InsulinCalculator smart phone merry, but not used  Using Accucheck Guide BG meter              Has tested up to 4x/day              Does own I:C and ISF calculations  9/2019. She has worn a free sample LibrePersonal continuous glucose sensor              Started Freestyle Mary CGM, then upgraded to Libre2 CGM with [place]  Recent Freestyle Libre3 Plus data:        Previous FV hgbA1c trends include:  Lab Results   Component Value Date    A1C 8.0  (H) 12/12/2024    A1C 7.9 (H) 06/18/2024    A1C 7.3 (H) 11/22/2023    A1C 7.3 (H) 04/27/2023    A1C 7.9 (H) 09/22/2022        Recent [facility] labs include:  Lab Results   Component Value Date    A1C 8.0 (H) 12/12/2024     12/12/2024    POTASSIUM 3.8 12/12/2024    CHLORIDE 100 12/12/2024    CO2 28 12/12/2024    ANIONGAP 10 12/12/2024     (H) 12/12/2024    BUN 17.5 12/12/2024    CR 0.92 12/12/2024    GFRESTIMATED 74 12/12/2024    GFRESTBLACK 87 02/19/2021    FINESSE 9.6 12/12/2024    CHOL 135 12/12/2024    TRIG 77 12/12/2024    HDL 52 12/12/2024    LDL 68 12/12/2024    NHDL 83 12/12/2024    UCRR 127.0 12/12/2024    MICROL 45.1 12/12/2024    UMALCR 35.51 (H) 12/12/2024     Lab Results   Component Value Date    TSH 2.78 12/12/2024    T4 0.96 02/18/2011     No history of vascular disease.  Takes simvastatin for hyperlipidemia management.  DM Complications:              Retinopathy                          Previous proliferative DR and bilateral laser PC surgeries                          Had cataracts, retinal detachment repair OD, higher occular pressures                          Significant vision loss, needs print magnification                          Sees Fer Davison and LUCY Ahmadi/ophthalmology              Neuropathy:                          Decreased sensation in feet          Single, lives in [place], [place]; now works part time at [employer]  Exercises at [employer] vs [place], enjoys exercise classes  Sees Dr. Humberto Melendez/[facility] for general medicine evaluations.  Also sees Dr. Blanco/[facility]      PMH/PSH:  Past Medical History:   Diagnosis Date    Diabetic retinopathy associated with type 1 diabetes mellitus (H)     proliferative DR and bilateral laser PC surgeries    Dry eyes     Hyperlipidemia LDL goal < 130     RA (rheumatoid arthritis) (H)     seeing rheumatologist.    Sjoegren syndrome     seeing rheumatologist.    Type 1 diabetes mellitus (H)     retinopathy,  neuropathy    Visual impairment      Past Surgical History:   Procedure Laterality Date    [facility] NONSPECIFIC PROCEDURE  8/00/97    T & A    [facility] NONSPECIFIC PROCEDURE  5/00/99    L eye surgery    [facility] NONSPECIFIC PROCEDURE  4/10/01    R cataract + IOL    [facility] NONSPECIFIC PROCEDURE  2002    bilat vitrectomy    [facility] NONSPECIFIC PROCEDURE  7/04    R eye vitrectomy    [facility] NONSPECIFIC PROCEDURE  5/08    R vitrectomy & partial retinal detachment        Family Hx:  Family History   Problem Relation Age of Onset    Arthritis Mother         on celebrex,alive & healthy 2002    Breast Cancer Mother     Syncope Mother     Arthritis Father         on celebrex(2002)    Heart Disease Father         heart attack 1990, 60 years old (2002)    Diabetes Father     Cerebrovascular Disease Father         age 70    Thyroid Disease Sister         on thyroid med for couple of years , 35 yrs now(2002)    Cancer Paternal Grandmother         STOMACH    Cerebrovascular Disease Paternal Grandfather         age 90    Breast Cancer Cousin 55    Colorectal Cancer Cousin     Breast Cancer Other     Colorectal Cancer Other          Social Hx:  Social History     Socioeconomic History    Marital status: Single     Spouse name:     Number of children: 0    Years of education: Not on file    Highest education level: Not on file   Occupational History    Occupation: [employer]     Employer: [employer]   Tobacco Use    Smoking status: Never    Smokeless tobacco: Never   Substance and Sexual Activity    Alcohol use: Yes     Comment: 1 drink every 3 weeks    Drug use: No    Sexual activity: Not Currently     Partners: Male     Comment:    Other Topics Concern    Parent/sibling w/ CABG, MI or angioplasty before 65F 55M? Not Asked   Social History Narrative    Not on file     Social Drivers of Health     Financial Resource Strain: Not on file   Food Insecurity: Not on file   Transportation Needs: Not on file   Physical Activity: Not on  file   Stress: Not on file   Social Connections: Not on file   Interpersonal Safety: Low Risk  (12/12/2024)    Interpersonal Safety     Do you feel physically and emotionally safe where you currently live?: Yes     Within the past 12 months, have you been hit, slapped, kicked or otherwise physically hurt by someone?: No     Within the past 12 months, have you been humiliated or emotionally abused in other ways by your partner or ex-partner?: No   Housing Stability: Not on file          MEDICATIONS:  has a current medication list which includes the following prescription(s): brimonidine, freestyle thea 3 reader, freestyle thea 3 plus sensor, restasis, diclofenac, dorzolamide-timolol, ferrous sulfate, folic acid, infliximab, insulin aspart, lantus solostar, bd adelfo u/f, latanoprost, losartan, methotrexate, fish oil triple strength, one daily multiple vitamin, prednisone, simvastatin, sulfasalazine, valacyclovir, blood glucose, blood glucose, blood glucose monitoring, and blood glucose.        ROS: 10 point ROS neg other than the symptoms noted above in the HPI.     GENERAL: some fatigue; weight stable; denies fevers, chills, night sweats.   HEENT: minimal/no vision from right eye, dry eyes/mouth; no dysphagia, odonophagia, diplopia  THYROID:  no apparent hyper or hypothyroid symptoms  CV: no chest pain, pressure, palpitations  LUNGS: no SOB, FUENTES, cough, wheezing   ABDOMEN: rare morning nausea; no diarrhea, constipation, abdominal pain  EXTREMITIES: no rashes, ulcers, edema  NEUROLOGY: decreased sensation at feet; no headaches, denies changes in vision, tingling, extremitiy numbness   MSK: mild hand tendinitis pains; no muscle aches, weakness  SKIN: scalp hair thinning; denies rashes or lesions/foot sores  :  no menses since stopping OCP medication 1/2021  PSYCH:  stable mood, no significant anxiety or depression  ENDOCRINE: intermittent hot flashes    Physical Exam   VS: BP (!) 193/78   Pulse 88   Wt 51.7 kg  (114 lb)   BMI 19.88 kg/m     CONSTITUTIONAL: healthy, alert and NAD, well dressed, answering questions appropriately  ENT: no nose swelling or nasal discharge, mouth redness or gum changes.  EYES: eyes grossly normal to inspection, conjunctivae and sclerae normal, no exophthalmos or proptosis  THYROID:  no apparent nodules or goiter  LUNGS: no audible wheeze, cough or visible cyanosis, no visible retractions or increased work of breathing  ABDOMEN: abdomen normal size  EXTREMITIES: normal appearance of feet, pulses R/L 3/3, slowed flexion with left middle finger; no hand tremors  NEUROLOGY: CN grossly intact, mentation intact and speech normal   SKIN:  no apparent skin lesions, rash, or edema with visualized skin appearance  PSYCH: mentation appears normal, affect normal/bright, judgement and insight intact,   normal speech and appearance well groomed    LABS:     All pertinent notes, labs, and images personally reviewed by me.        A/P:  Encounter Diagnoses   Name Primary?    Type 1 diabetes mellitus with proliferative retinopathy, macular edema presence unspecified, unspecified laterality, unspecified proliferative retinopathy type (H) Yes    Type 1 diabetes mellitus with diabetic neuropathy (H)          Comments:  Reviewed the complicated diabetes and health history  Recent SG trends indicate higher afternoon postprandial glucose levels  Reviewed and interpreted tests that I previously ordered.   Ordered appropriate tests for the endocrinology disease management.    Management options discussed and implemented after shared medical decision making with the patient.  T1DM problem is chronic-stable    Plan:  Discussed general issues with the diabetes diagnosis and managemen  We discussed the hgbA1c test which reflects previous overall glucose levels or control  Discussed the importance of blood glucose (BG) testing to assess glucose trends  Discussed use of the Accu-check Guide BG meter  Provided general  overview of the multiple daily injection (MDI) plan using rapid acting mealtime               and longacting insulin medications  Reviewed recent Freestyle Libre3 CGM glucose sensor trend data, in detail      Recommend:  Continue current Fiasp mealtime & Lantus insulin medications:  Reviewed importance of taking the Fiasp injection dose at start of the meals and snacks, not postmeal  Consider using the InsulinCalculator [employer] merry as insulin dose calculator  Lower breakfast Fiasp dose by 1U on workdays to avoid workplace hypoglycemia  If premeal sensor glucose at low end of range, reduce the Fiasp dose by 1-2 units  Goal target premeal glucose 80- 150 mg/dl  Continue use of Freestyle Mary CGM but change to Libre3 Plus, [facility] Rx sent to [facility]    We have discussed hypoglycemia management, use of glucose tabs or jelly bean snack, Omnipod pump option  Lab tests at Fall 2024 PCP appointment   Testing at [facility]   Lab orders placed  Continue the losartan daily dose  Continue current simvastatin lipid medication   Advise having fasting lipid panel testing and dilated eye examination at least annually     Patient to follow up with their Primary Care Provider regarding the elevated blood pressure.  Addressed patient questions today    The longitudinal plan of care for the endocrine problem(s) were addressed during this visit.  Due to added complexity of care,   we will continue to support the patient and the subsequent management of this condition with ongoing continuity of care.    Patient Instructions   Advise lowering the Lantus and softening the Fiasp carb ratio but take Fiasp pre-meal    Current DM medications:  Lantus Solostar..........12U each morning  Fiasp Flextouch                         Breakfast...........1:13 (typically 6-7U)   Supper...............1:13 (typically 6-10U)  ....................................sscale 1U per 40>125  ....................................target  mg/dl    Try to take  the Fiasp at start of the meal  Continue use of the Libre3 Plus glucose sensor  Next endocrinology appointments:  - late 7/2025 with Lizeth Stevens CNP  - 11/6/25 with Dr. Lowe at 11:30 am    Future labs ordered today:   Orders Placed This Encounter   Procedures    GLUCOSE MONITOR, 72 HOUR, PHYS INTERP    Hemoglobin A1c    Basic metabolic panel     Radiology/Consults ordered today: None    Total time spent on day of encounter:  30 min    Follow-up:  late 7/7/25 with EG, Return,    11/6/25 with me at 11:30am, Return    TI Lowe MD, MS  Endocrinology  [facility]    CC:  CHUY Melendez

## 2025-05-08 ENCOUNTER — TELEPHONE (OUTPATIENT)
Dept: ENDOCRINOLOGY | Facility: CLINIC | Age: 55
End: 2025-05-08
Payer: COMMERCIAL

## 2025-05-08 NOTE — TELEPHONE ENCOUNTER
Received fax from [employer], insulin aspart (FIASP FLEXTOUCH) 100 UNIT/ML pen-injector NOT covered.   Please send alternative or initiate PA?    Please advise!    Vivienne Meza MA

## 2025-05-12 ENCOUNTER — MYC MEDICAL ADVICE (OUTPATIENT)
Dept: ENDOCRINOLOGY | Facility: CLINIC | Age: 55
End: 2025-05-12
Payer: COMMERCIAL

## 2025-05-12 DIAGNOSIS — E10.3599 TYPE 1 DIABETES MELLITUS WITH PROLIFERATIVE RETINOPATHY, MACULAR EDEMA PRESENCE UNSPECIFIED, UNSPECIFIED LATERALITY, UNSPECIFIED PROLIFERATIVE RETINOPATHY TYPE (H): Primary | ICD-10-CM

## 2025-05-12 NOTE — TELEPHONE ENCOUNTER
Message reviewed.  I checked with our [facility] pharmacy liaison team and was informed her insurance covers Novolog and Humalog with $25 copay, but Fiasp non-preferred.  We will switch the insulin pen Rx to Novolog Flexpen, and I'll message her by Brad today.    TI Lowe MD, MS  Endocrinology  [facility]

## 2025-06-09 ENCOUNTER — PATIENT OUTREACH (OUTPATIENT)
Dept: CARE COORDINATION | Facility: CLINIC | Age: 55
End: 2025-06-09
Payer: COMMERCIAL

## 2025-06-14 ENCOUNTER — HEALTH MAINTENANCE LETTER (OUTPATIENT)
Age: 55
End: 2025-06-14

## 2025-06-20 ENCOUNTER — TELEPHONE (OUTPATIENT)
Dept: INTERNAL MEDICINE | Facility: CLINIC | Age: 55
End: 2025-06-20
Payer: COMMERCIAL

## 2025-06-20 DIAGNOSIS — I10 PRIMARY HYPERTENSION: ICD-10-CM

## 2025-06-20 RX ORDER — LOSARTAN POTASSIUM 25 MG/1
TABLET ORAL
Qty: 21 TABLET | Refills: 0 | Status: SHIPPED | OUTPATIENT
Start: 2025-06-20

## 2025-06-20 NOTE — TELEPHONE ENCOUNTER
Last BP with endocrinologist was high 193/78.  Pt should be seen in clinic for BP evaluation and med adjustment. Okay to schedule on any of my same-day or approval only slot, I have slots available on 06/26/25 and 06/30. 07/1/25 and  07/2/25  med refilled for 2 weeks only

## 2025-06-25 ENCOUNTER — TRANSFERRED RECORDS (OUTPATIENT)
Dept: HEALTH INFORMATION MANAGEMENT | Facility: CLINIC | Age: 55
End: 2025-06-25
Payer: COMMERCIAL

## 2025-06-25 DIAGNOSIS — E78.5 HYPERLIPIDEMIA LDL GOAL <100: ICD-10-CM

## 2025-06-25 RX ORDER — SIMVASTATIN 40 MG
40 TABLET ORAL AT BEDTIME
Qty: 90 TABLET | Refills: 1 | Status: SHIPPED | OUTPATIENT
Start: 2025-06-25

## 2025-06-27 ENCOUNTER — TRANSFERRED RECORDS (OUTPATIENT)
Dept: MULTI SPECIALTY CLINIC | Facility: CLINIC | Age: 55
End: 2025-06-27
Payer: COMMERCIAL

## 2025-06-27 LAB — RETINOPATHY: NORMAL

## 2025-06-30 ENCOUNTER — OFFICE VISIT (OUTPATIENT)
Dept: INTERNAL MEDICINE | Facility: CLINIC | Age: 55
End: 2025-06-30
Payer: COMMERCIAL

## 2025-06-30 VITALS
SYSTOLIC BLOOD PRESSURE: 124 MMHG | DIASTOLIC BLOOD PRESSURE: 70 MMHG | OXYGEN SATURATION: 100 % | BODY MASS INDEX: 20.23 KG/M2 | HEIGHT: 63 IN | WEIGHT: 114.2 LBS | TEMPERATURE: 97.4 F | RESPIRATION RATE: 17 BRPM | HEART RATE: 88 BPM

## 2025-06-30 DIAGNOSIS — M06.9 RHEUMATOID ARTHRITIS, INVOLVING UNSPECIFIED SITE, UNSPECIFIED WHETHER RHEUMATOID FACTOR PRESENT (H): ICD-10-CM

## 2025-06-30 DIAGNOSIS — Z00.00 ROUTINE HISTORY AND PHYSICAL EXAMINATION OF ADULT: ICD-10-CM

## 2025-06-30 DIAGNOSIS — E10.3599 TYPE 1 DIABETES MELLITUS WITH PROLIFERATIVE RETINOPATHY, MACULAR EDEMA PRESENCE UNSPECIFIED, UNSPECIFIED LATERALITY, UNSPECIFIED PROLIFERATIVE RETINOPATHY TYPE (H): ICD-10-CM

## 2025-06-30 DIAGNOSIS — E78.5 HYPERLIPIDEMIA LDL GOAL <100: ICD-10-CM

## 2025-06-30 DIAGNOSIS — Z12.11 SCREEN FOR COLON CANCER: Primary | ICD-10-CM

## 2025-06-30 DIAGNOSIS — I10 PRIMARY HYPERTENSION: ICD-10-CM

## 2025-06-30 LAB
EST. AVERAGE GLUCOSE BLD GHB EST-MCNC: 189 MG/DL
HBA1C MFR BLD: 8.2 % (ref 0–5.6)

## 2025-06-30 PROCEDURE — 80048 BASIC METABOLIC PNL TOTAL CA: CPT | Performed by: INTERNAL MEDICINE

## 2025-06-30 PROCEDURE — 80061 LIPID PANEL: CPT | Performed by: INTERNAL MEDICINE

## 2025-06-30 PROCEDURE — 36415 COLL VENOUS BLD VENIPUNCTURE: CPT | Performed by: INTERNAL MEDICINE

## 2025-06-30 PROCEDURE — 83036 HEMOGLOBIN GLYCOSYLATED A1C: CPT | Performed by: INTERNAL MEDICINE

## 2025-06-30 PROCEDURE — 99214 OFFICE O/P EST MOD 30 MIN: CPT | Mod: 25 | Performed by: INTERNAL MEDICINE

## 2025-06-30 PROCEDURE — G2211 COMPLEX E/M VISIT ADD ON: HCPCS | Performed by: INTERNAL MEDICINE

## 2025-06-30 PROCEDURE — 99396 PREV VISIT EST AGE 40-64: CPT | Performed by: INTERNAL MEDICINE

## 2025-06-30 RX ORDER — LOSARTAN POTASSIUM 25 MG/1
25 TABLET ORAL DAILY
Qty: 90 TABLET | Refills: 1 | Status: SHIPPED | OUTPATIENT
Start: 2025-06-30

## 2025-06-30 SDOH — HEALTH STABILITY: PHYSICAL HEALTH: ON AVERAGE, HOW MANY DAYS PER WEEK DO YOU ENGAGE IN MODERATE TO STRENUOUS EXERCISE (LIKE A BRISK WALK)?: 0 DAYS

## 2025-06-30 SDOH — HEALTH STABILITY: PHYSICAL HEALTH: ON AVERAGE, HOW MANY MINUTES DO YOU ENGAGE IN EXERCISE AT THIS LEVEL?: 0 MIN

## 2025-06-30 ASSESSMENT — SOCIAL DETERMINANTS OF HEALTH (SDOH): HOW OFTEN DO YOU GET TOGETHER WITH FRIENDS OR RELATIVES?: ONCE A WEEK

## 2025-06-30 NOTE — Clinical Note
Please abstract the following data from this visit with this patient into the appropriate field in Epic: sent Eye exam with ophthalmology on this date: Eye exam -06/27/25- [facility] [place]

## 2025-06-30 NOTE — PROGRESS NOTES
Preventive Care Visit  [facility]  Calos Melendez MD, Internal Medicine  Jun 30, 2025      Assessment & Plan        (Z00.00) Routine history and physical examination of adult  Plan:  Lipid panel reflex to direct LDL Fasting, , Basic metabolic panel, up to date on mammogram, immunization reviewed, recommended Shingrix vaccine , pt states she will check with her insurcance      (I10) Primary hypertension  Plan: BP stable , on losartan 25 mg daily refilled.explained clearly about the medication,insructions and side effects.   . Advised to follow low salt diet and exercise and f/u in 6 mths.       (E78.5) Hyperlipidemia LDL goal <100  Plan: on simvastatin 40 mg daily, had recent liver enzymes with her rheumatologist. AST 40 and ALT 39 ( 06/25)  check Lipid panel reflex to direct LDL Fasting .pt was told I will contact her after results and proceed accordingly.      (M06.9) Rheumatoid arthritis, involving unspecified site, unspecified whether rheumatoid factor present (H)  Plan:  on immunosuppressants       (Z12.11) Screen for colon cancer    Plan: pt declined colonoscopy, ordered Fecal colorectal cancer screen (FIT)      (E10.3599) Type 1 diabetes mellitus with proliferative retinopathy, macular edema presence unspecified, unspecified laterality, unspecified proliferative retinopathy type (H)  Plan: follows up with endocrinologist , check Hemoglobin A1c             Patient has been advised of split billing requirements and indicates understanding: Yes      Reviewed preventive health counseling, as reflected in patient instructions       Regular exercise       Healthy diet/nutrition  Counseling  Appropriate preventive services were addressed with this patient via screening, questionnaire, or discussion as appropriate for fall prevention, nutrition, physical activity, Tobacco-use cessation, social engagement, weight loss and cognition.  Checklist reviewing preventive services available  has been given to the patient.  Reviewed patient's diet, addressing concerns and/or questions.   She is at risk for psychosocial distress and has been provided with information to reduce risk.       Follow-up 6 months    The longitudinal plan of care for the diagnosis(es)/condition(s) as documented were addressed during this visit. Due to the added complexity in care, I will continue to support Perlita in the subsequent management and with ongoing continuity of care.        Subjective   Perlita is a 54 year old, presenting for the following:  Physical (Not fasting)        6/30/2025     1:23 PM   Additional Questions   Roomed by ezio   Accompanied by self         6/30/2025     1:23 PM   Patient Reported Additional Medications   Patient reports taking the following new medications none         HPI     Hyperlipidemia Follow-Up    Are you regularly taking any medication or supplement to lower your cholesterol?   Yes- simvastatin  Are you having muscle aches or other side effects that you think could be caused by your cholesterol lowering medication?  No    Hypertension Follow-up    Do you check your blood pressure regularly outside of the clinic? No   Are you following a low salt diet? Yes  Are your blood pressures ever more than 140 on the top number (systolic) OR more   than 90 on the bottom number (diastolic), for example 140/90? Yes    BP Readings from Last 2 Encounters:   06/30/25 118/67   04/29/25 (!) 193/78     Rh arthritis; follows up with rheumatologist    Diabetes -follows up with endocrinologist  [facility] exam -06/27/25- [facility] [place]       Advance Care Planning    Discussed advance care planning with patient; informed AVS has link to [employer].        6/30/2025   General Health   How would you rate your overall physical health? Good   Feel stress (tense, anxious, or unable to sleep) To some extent         6/30/2025   Nutrition   Three or more servings of calcium each day? Yes   Diet: Carbohydrate  counting   How many servings of fruit and vegetables per day? (!) 2-3   How many sweetened beverages each day? 0-1         6/30/2025   Exercise   Days per week of moderate/strenous exercise 0 days   Average minutes spent exercising at this level 0 min         6/30/2025   Social Factors   Frequency of gathering with friends or relatives Once a week         6/30/2025   Fall Risk   Fallen 2 or more times in the past year? No    No   Trouble with walking or balance? Yes    No       Multiple values from one day are sorted in reverse-chronological order           6/30/2025   Dental   Dentist two times every year? Yes         Today's PHQ-2 Score:       6/30/2025     1:24 PM   PHQ-2 ( 1999 [employer])   Q1: Little interest or pleasure in doing things 0   Q2: Feeling down, depressed or hopeless 0   PHQ-2 Score 0           6/30/2025   Substance Use   Alcohol more than 3/day or more than 7/wk No     Social History     Tobacco Use    Smoking status: Never    Smokeless tobacco: Never   Substance Use Topics    Alcohol use: Yes     Comment: 1 drink every 3 weeks    Drug use: No           12/18/2024   LAST FHS-7 RESULTS   1st degree relative breast or ovarian cancer Yes mother   Any relative bilateral breast cancer No   Any male have breast cancer No   Any ONE woman have BOTH breast AND ovarian cancer No   Any woman with breast cancer before 50yrs No   2 or more relatives with breast AND/OR ovarian cancer Yes   2 or more relatives with breast AND/OR bowel cancer No       pt declines genetic counseling       Mammogram completed 12/24      History of abnormal Pap smear: No - age 30-64 HPV with reflex Pap every 5 years recommended- follows up with OBGYN        Latest Ref Rng & Units 4/29/2024    11:40 AM   PAP / HPV   PAP  Negative for Intraepithelial Lesion or Malignancy (NILM)    HPV 16 DNA Negative Negative    HPV 18 DNA Negative Negative    Other HR HPV Negative Negative      ASCVD Risk   The 10-year ASCVD risk score (Pro  ROBERT, et al., 2019) is: 7.2%    Values used to calculate the score:      Age: 54 years      Sex: Female      Is Non- : No      Diabetic: Yes      Tobacco smoker: No      Systolic Blood Pressure: 193 mmHg      Is BP treated: Yes      HDL Cholesterol: 52 mg/dL      Total Cholesterol: 135 mg/dL           Reviewed and updated as needed this visit by Provider                    Past Medical History:   Diagnosis Date    Diabetic retinopathy associated with type 1 diabetes mellitus (H)     proliferative DR and bilateral laser PC surgeries    Dry eyes     Hyperlipidemia LDL goal < 130     RA (rheumatoid arthritis) (H)     seeing rheumatologist.    Sjoegren syndrome     seeing rheumatologist.    Type 1 diabetes mellitus (H)     retinopathy, neuropathy    Visual impairment      Past Surgical History:   Procedure Laterality Date    [facility] NONSPECIFIC PROCEDURE  8/00/97    T & A    [facility] NONSPECIFIC PROCEDURE  5/00/99    L eye surgery    [facility] NONSPECIFIC PROCEDURE  4/10/01    R cataract + IOL    [facility] NONSPECIFIC PROCEDURE  2002    bilat vitrectomy    [facility] NONSPECIFIC PROCEDURE  7/04    R eye vitrectomy    [facility] NONSPECIFIC PROCEDURE  5/08    R vitrectomy & partial retinal detachment      Current Outpatient Medications   Medication Sig Dispense Refill    blood glucose (EDWIN CONTOUR) test strip 1 strip by In Vitro route 4 times daily (before meals and nightly) Checks 4-5 x daily 400 strip 3    blood glucose (NO BRAND SPECIFIED) test strip Use to test blood sugar 4 times daily or as directed, Accu-check Guide test strips, E10.9. 400 strip 0    blood glucose monitoring (NO BRAND SPECIFIED) meter device kit Use to test blood sugar 5 times daily or as directed, Accu-check Guide meter. 1 kit 1    Continuous Blood Gluc  (FREESTYLE LOUIS 3 READER) SHAR 1 Device continuous prn (use for continuous glucose testing) 1 each 0    Continuous Glucose Sensor (FREESTYLE LOUIS 3 PLUS SENSOR) MISC CHANGE EVERY 15 DAYS. 6 each 1     cycloSPORINE (RESTASIS) 0.05 % ophthalmic emulsion Restasis 0.05 % eye drops in a dropperette   INT 1 GTT IN OU BID      diclofenac (VOLTAREN) 75 MG EC tablet TK 1 T PO BID  2    dorzolamide-timolol (COSOPT) 2-0.5 % ophthalmic solution INSTILL 1 GTT IN OU QAM  6    Ferrous Sulfate (IRON PO) Blood builder      FOLIC ACID PO Take 1 mg by mouth 2 times daily 500      InFLIXimab (REMICADE IV) Inject 500 mg into the vein Every 7 weeks      insulin aspart (FIASP FLEXTOUCH) 100 UNIT/ML pen-injector ADMINISTER 3 TO 8 UNITS UNDER THE SKIN THREE TIMES DAILY WITH MEALS. 15 mL 5    insulin aspart (NOVOLOG PEN) 100 UNIT/ML pen Inject 3-8 Units subcutaneously 3 times daily (with meals). , total daily dose approx 30U 15 mL 5    insulin glargine (LANTUS SOLOSTAR) 100 UNIT/ML pen Inject 11-13 Units subcutaneously every morning. 15 mL 5    insulin pen needle (BD ELIUD U/F) 32G X 4 MM miscellaneous Use 4 pen needles daily or as directed. 400 each 3    latanoprost (XALATAN) 0.005 % ophthalmic solution       losartan (COZAAR) 25 MG tablet TAKE 1 AND 1/2 TABLETS(37.5 MG) BY MOUTH DAILY (Patient taking differently: Been taking 1 tab daily as of 6/30/25) 21 tablet 0    Methotrexate Sodium (METHOTREXATE PO) Take by mouth once a week 5 tablets once a week      Omega-3 Fatty Acids (FISH OIL TRIPLE STRENGTH) 1400 MG CAPS Take 2 tablets by mouth daily. Omega 369 mg      ONE DAILY MULTIPLE VITAMIN OR TABS one daily  0    predniSONE (DELTASONE) 5 MG tablet 2 mg. 2 mg daily  0    simvastatin (ZOCOR) 40 MG tablet TAKE 1 TABLET BY MOUTH AT BEDTIME 90 tablet 1    sulfaSALAzine (AZULFIDINE) 500 MG tablet Take 500 mg by mouth 2 times daily 2 tablets twice daily      valACYclovir (VALTREX) 500 MG tablet Take 500 mg by mouth daily  14 tablet prn    blood glucose monitoring (NO BRAND SPECIFIED) test strip Use to test blood sugar 4 times daily or as directed, Accu-check Guide meter test strips (Patient not taking: Reported on 2/23/2022) 400 strip 3     "brimonidine (ALPHAGAN) 0.2 % ophthalmic solution INT 1 GTT IN OU BID (Patient not taking: Reported on 6/30/2025)  3         Review of Systems  CONSTITUTIONAL: NEGATIVE for fever, chills,    EYES: NEGATIVE for vision changes or irritation  ENT/MOUTH: NEGATIVE for ear, mouth and throat problems  RESP: NEGATIVE for significant cough or SOB  BREAST: NEGATIVE for masses, tenderness or discharge  CV: NEGATIVE for chest pain, palpitations or peripheral edema  GI: NEGATIVE for nausea, abdominal pain, heartburn, or change in bowel habits  : NEGATIVE for frequency, dysuria, or hematuria  MUSCULOSKELETAL: NEGATIVE for significant arthralgias or myalgia  NEURO: NEGATIVE for weakness, dizziness or paresthesias  ENDOCRINE: NEGATIVE for temperature intolerance, skin/hair changes  HEME: NEGATIVE for bleeding problems  PSYCHIATRIC: NEGATIVE for changes in mood or affect     Objective    Exam  /67   Pulse 88   Temp 97.4  F (36.3  C) (Tympanic)   Resp 17   Ht 1.6 m (5' 3\")   Wt 51.8 kg (114 lb 3.2 oz)   LMP 06/06/2025   SpO2 100%   BMI 20.23 kg/m     Estimated body mass index is 20.23 kg/m  as calculated from the following:    Height as of this encounter: 1.6 m (5' 3\").    Weight as of this encounter: 51.8 kg (114 lb 3.2 oz).    Physical Exam  GENERAL: alert and no distress  EYES: Eyes grossly normal to inspection, PERRL and conjunctivae and sclerae normal  HENT: ear canals and TM's normal, nose and mouth without ulcers or lesions  RESP: lungs clear to auscultation - no rales, rhonchi or wheezes  BREAST: with OBGYN   CV: regular rate and rhythm, normal S1 S2,   ABDOMEN: soft, nontender,   and bowel sounds normal  MS: no gross musculoskeletal defects noted, no edema  NEURO: Normal strength and tone, mentation intact and speech normal  PSYCH: mentation appears normal, affect normal/bright  normal DP and PT pulses, no trophic changes or ulcerative lesions, normal sensory exam, and normal monofilament exam     Please " abstract the following data from this visit with this patient into the appropriate field in Epic: sent  Eye exam with ophthalmology on this date: Eye exam -06/27/25- [facility] [place]     Signed Electronically by: Calos Melendez MD

## 2025-06-30 NOTE — NURSING NOTE
"/67   Pulse 88   Temp 97.4  F (36.3  C) (Tympanic)   Resp 17   Ht 1.6 m (5' 3\")   Wt 51.8 kg (114 lb 3.2 oz)   LMP 06/06/2025   SpO2 100%   BMI 20.23 kg/m      "

## 2025-07-01 LAB
ANION GAP SERPL CALCULATED.3IONS-SCNC: 9 MMOL/L (ref 7–15)
BUN SERPL-MCNC: 19.6 MG/DL (ref 6–20)
CALCIUM SERPL-MCNC: 9.2 MG/DL (ref 8.8–10.4)
CHLORIDE SERPL-SCNC: 100 MMOL/L (ref 98–107)
CHOLEST SERPL-MCNC: 139 MG/DL
CREAT SERPL-MCNC: 0.95 MG/DL (ref 0.51–0.95)
EGFRCR SERPLBLD CKD-EPI 2021: 71 ML/MIN/1.73M2
FASTING STATUS PATIENT QL REPORTED: NO
FASTING STATUS PATIENT QL REPORTED: NO
GLUCOSE SERPL-MCNC: 256 MG/DL (ref 70–99)
HCO3 SERPL-SCNC: 28 MMOL/L (ref 22–29)
HDLC SERPL-MCNC: 60 MG/DL
LDLC SERPL CALC-MCNC: 68 MG/DL
NONHDLC SERPL-MCNC: 79 MG/DL
POTASSIUM SERPL-SCNC: 4.1 MMOL/L (ref 3.4–5.3)
SODIUM SERPL-SCNC: 137 MMOL/L (ref 135–145)
TRIGL SERPL-MCNC: 56 MG/DL

## 2025-07-07 ENCOUNTER — OFFICE VISIT (OUTPATIENT)
Dept: ENDOCRINOLOGY | Facility: CLINIC | Age: 55
End: 2025-07-07
Payer: COMMERCIAL

## 2025-07-07 VITALS
SYSTOLIC BLOOD PRESSURE: 136 MMHG | BODY MASS INDEX: 20.19 KG/M2 | DIASTOLIC BLOOD PRESSURE: 73 MMHG | WEIGHT: 114 LBS | HEART RATE: 80 BPM

## 2025-07-07 DIAGNOSIS — E10.40 TYPE 1 DIABETES MELLITUS WITH DIABETIC NEUROPATHY (H): ICD-10-CM

## 2025-07-07 DIAGNOSIS — E78.5 HYPERLIPIDEMIA LDL GOAL <100: ICD-10-CM

## 2025-07-07 DIAGNOSIS — E10.29 TYPE 1 DIABETES MELLITUS WITH MICROALBUMINURIA (H): ICD-10-CM

## 2025-07-07 DIAGNOSIS — E10.3599 TYPE 1 DIABETES MELLITUS WITH PROLIFERATIVE RETINOPATHY, MACULAR EDEMA PRESENCE UNSPECIFIED, UNSPECIFIED LATERALITY, UNSPECIFIED PROLIFERATIVE RETINOPATHY TYPE (H): Primary | ICD-10-CM

## 2025-07-07 DIAGNOSIS — I10 PRIMARY HYPERTENSION: ICD-10-CM

## 2025-07-07 DIAGNOSIS — R80.9 TYPE 1 DIABETES MELLITUS WITH MICROALBUMINURIA (H): ICD-10-CM

## 2025-07-07 DIAGNOSIS — M06.9 RHEUMATOID ARTHRITIS, INVOLVING UNSPECIFIED SITE, UNSPECIFIED WHETHER RHEUMATOID FACTOR PRESENT (H): ICD-10-CM

## 2025-07-07 NOTE — LETTER
7/7/2025      Perlita Chavez  [address] [facility] [address]      Dear Colleague,    Thank you for referring your patient, Perlita Chavez, to the [facility]. Please see a copy of my visit note below.             Diabetes Consult Note  July 6, 2025        Perlita Chavez  is a 54 year old female who has a past medical history of Diabetic retinopathy associated with type 1 diabetes mellitus (H), Dry eyes, Hyperlipidemia LDL goal < 130, RA (rheumatoid arthritis) (H), Sjoegren syndrome, Type 1 diabetes mellitus (H), and Visual impairment.             HPI:  Patient of Dr. Lowe, last seen April 2025. This is my first time meeting her for management of Type 1 Diabetes.  Diabetes was diagnosed in 1972 (age 2).   Diabetes has been complicated by peripheral neuropathy,   Initially treated with NPH and regular insulin, then MDI.   She tried Omnipod in 2014  Used Novolog Echo 1/2U pen, with I:C carb counting method  Tried Lantus BID dosing, then Tresiba (expensive), then Basaglaru              Previously used Novolog insulin  5/2018. Switched to Fiasp 3/2018, then Fiasp non-formulary 2019    Takes prednisone 2 mg daily for RA.    At last visit, Dr. Lowe recommended decreasing Lantus and softening Carb ratio, but bolusing at the start of meals (pt had been bolusing frequently after meals).     In the interim, insurance no longer covered Fiasp, switched to Humalog.  She follow up with Dr. Balnco Rhe[facility] 6/26  Dr. Melendez 6/6/2025    Today she reports she is still working on bolusing before meals, this has been challenging and she usually takes post-meal. She is using up Fiasp before switching to Humalog. She has a hard time bolusing before meals. She is having a hard year due to 4 family members passing. Noticed increased blood glucose since she has stopped exercising. She works in retail.      Current Diabetes Medications:  Lantus 12 units once daily in the morning    Fiasp  1:15 carb ratio, Sliding scale 1 per 50>150 (Carb ratio 1:13 and Sliding scale 1u per 40>125 recommended at last visit with Dr. Lowe)    We reviewed glucometer/CGMS data together.  It revealed:  TIR: 37%  High 26%  Very high 37%  Low 0%  Average blood glucose 224 mg/dL  Overall pattern: Variable blood glucose running above goal on average throughout the day. Significant spikes with meals. Brief, infrequent lows.              History of Diabetes monitoring and complications/ prevention:  CAD: No  Last eye exam results: June 27 2025 Retina Specialist Dr. Al Ahmadi. Hx proliferative diabetic retinopathy.  Last dental exam: Not assessed  Neuropathy: +history noted in chart. Denies symptoms today. (+arthritis pain in feet)  Nephropathy: +microalbuminuria 12/2024  HTN: At goal today on recheck  On statin: Yes, simvastatin 40 mg, LDL 68 on 6/30/2025  Depression: Dealing with grief with 4 family members passing. Thinks she has had some mild depression. Denies thoughts of harming self or others.  Feet: No ulcers or lesions per patient.    Perlita's PMH, PSH and allergies were reviewed today and pertinent information is summarized above.    Perlita's pertinent social and family history are also reviewed today and pertinent information is summarized above.    Current Outpatient Medications   Medication Sig Dispense Refill     blood glucose (EDWIN CONTOUR) test strip 1 strip by In Vitro route 4 times daily (before meals and nightly) Checks 4-5 x daily 400 strip 3     blood glucose (NO BRAND SPECIFIED) test strip Use to test blood sugar 4 times daily or as directed, Accu-check Guide test strips, E10.9. 400 strip 0     blood glucose monitoring (NO BRAND SPECIFIED) meter device kit Use to test blood sugar 5 times daily or as directed, Accu-check Guide meter. 1 kit 1     blood glucose monitoring (NO BRAND SPECIFIED) test strip Use to test blood sugar 4 times daily or as directed, Accu-check Guide meter test strips (Patient not  taking: Reported on 2/23/2022) 400 strip 3     brimonidine (ALPHAGAN) 0.2 % ophthalmic solution INT 1 GTT IN OU BID (Patient not taking: Reported on 6/30/2025)  3     Continuous Blood Gluc  (FREESTYLE LOUIS 3 READER) SHAR 1 Device continuous prn (use for continuous glucose testing) 1 each 0     Continuous Glucose Sensor (FREESTYLE LOUIS 3 PLUS SENSOR) MISC CHANGE EVERY 15 DAYS. 6 each 1     cycloSPORINE (RESTASIS) 0.05 % ophthalmic emulsion Restasis 0.05 % eye drops in a dropperette   INT 1 GTT IN OU BID       diclofenac (VOLTAREN) 75 MG EC tablet TK 1 T PO BID  2     dorzolamide-timolol (COSOPT) 2-0.5 % ophthalmic solution INSTILL 1 GTT IN OU QAM  6     Ferrous Sulfate (IRON PO) Blood builder       FOLIC ACID PO Take 1 mg by mouth 2 times daily 500       InFLIXimab (REMICADE IV) Inject 500 mg into the vein Every 7 weeks       insulin aspart (FIASP FLEXTOUCH) 100 UNIT/ML pen-injector ADMINISTER 3 TO 8 UNITS UNDER THE SKIN THREE TIMES DAILY WITH MEALS. 15 mL 5     insulin aspart (NOVOLOG PEN) 100 UNIT/ML pen Inject 3-8 Units subcutaneously 3 times daily (with meals). , total daily dose approx 30U 15 mL 5     insulin glargine (LANTUS SOLOSTAR) 100 UNIT/ML pen Inject 11-13 Units subcutaneously every morning. 15 mL 5     insulin pen needle (BD ELIUD U/F) 32G X 4 MM miscellaneous Use 4 pen needles daily or as directed. 400 each 3     latanoprost (XALATAN) 0.005 % ophthalmic solution        losartan (COZAAR) 25 MG tablet Take 1 tablet (25 mg) by mouth daily. 90 tablet 1     Methotrexate Sodium (METHOTREXATE PO) Take by mouth once a week 5 tablets once a week       Omega-3 Fatty Acids (FISH OIL TRIPLE STRENGTH) 1400 MG CAPS Take 2 tablets by mouth daily. Omega 369 mg       ONE DAILY MULTIPLE VITAMIN OR TABS one daily  0     predniSONE (DELTASONE) 5 MG tablet 2 mg. 2 mg daily  0     simvastatin (ZOCOR) 40 MG tablet TAKE 1 TABLET BY MOUTH AT BEDTIME 90 tablet 1     sulfaSALAzine (AZULFIDINE) 500 MG tablet Take 500 mg  "by mouth 2 times daily 2 tablets twice daily       valACYclovir (VALTREX) 500 MG tablet Take 500 mg by mouth daily  14 tablet prn     No current facility-administered medications for this visit.         ROS:   Reports good physical activity tolerance.  Denies any pedal lesions or vision changes or concerns. Denies any other acute concerns except as noted above.      Exam:    /73   Pulse 80   Wt 51.7 kg (114 lb)   LMP 06/06/2025   BMI 20.19 kg/m    Wt Readings from Last 10 Encounters:   07/07/25 51.7 kg (114 lb)   06/30/25 51.8 kg (114 lb 3.2 oz)   04/29/25 51.7 kg (114 lb)   12/12/24 50.8 kg (111 lb 14.4 oz)   10/04/24 51.7 kg (114 lb)   06/18/24 50.8 kg (112 lb)   03/19/24 52.2 kg (115 lb)   12/19/23 51.9 kg (114 lb 6.4 oz)   11/22/23 52.2 kg (115 lb 1.6 oz)   08/14/23 53.4 kg (117 lb 11.2 oz)     Estimated body mass index is 20.23 kg/m  as calculated from the following:    Height as of 6/30/25: 1.6 m (5' 3\").    Weight as of 6/30/25: 51.8 kg (114 lb 3.2 oz).    Physical Exam:  General: Pleasant, well nourished and hydrated female in NAD.   Psych:  Mood is \"good,\" affect is appropriate.  Thought form and content are fluid and coherent.    HEENT: Eyes and sclera are clear. Extraocular movements are grossly intact without proptosis.    Neck: No masses or JVD are noted.    Resp: Easy and unlabored breathing.   Neuro: Alert and oriented, communicating clearly.   Ext: no swelling or edema.  Data:      Recent Labs   Lab Test 06/30/25  1418 12/12/24  1118 12/12/24  1058 06/18/24  0927 11/22/23  1002 11/22/23  0944   A1C 8.2*  --  8.0* 7.9*  --  7.3*   TSH  --   --  2.78 3.95  --  3.51   LDL 68  --  68  --   --  59   HDL 60  --  52  --   --  53   TRIG 56  --  77  --   --  71   CR 0.95  --  0.92 1.04*  --  0.96*   MICROL  --  45.1  --   --  28.1  --    AST  --   --  28  --   --  30   ALT  --   --  24 24  --  26     No results found for: \"CPEPT\", \"GADAB\", \"ISCAB\"  Cholesterol   Date Value Ref Range Status " "  06/30/2025 139 <200 mg/dL Final   12/12/2024 135 <200 mg/dL Final   12/04/2020 159 <200 mg/dL Final   11/08/2019 190 <200 mg/dL Final       Hemoglobin   Date Value Ref Range Status   12/12/2024 11.4 (L) 11.7 - 15.7 g/dL Final   11/08/2019 11.4 (L) 11.7 - 15.7 g/dL Final   ]      Most recent GFRs:  GFR Estimate   Date Value Ref Range Status   06/30/2025 71 >60 mL/min/1.73m2 Final     Comment:     eGFR calculated using 2021 CKD-EPI equation.   12/12/2024 74 >60 mL/min/1.73m2 Final     Comment:     eGFR calculated using 2021 CKD-EPI equation.   06/18/2024 64 >60 mL/min/1.73m2 Final     Comment:     eGFR calculated using 2021 CKD-EPI equation.   02/19/2021 75 >60 mL/min/[1.73_m2] Final     Comment:     Non  GFR Calc  Starting 12/18/2018, serum creatinine based estimated GFR (eGFR) will be   calculated using the Chronic Kidney Disease Epidemiology Collaboration   (CKD-EPI) equation.     12/15/2020 77.3 ml/min/1.73m2 Final   09/16/2020 82 >60 mL/min/[1.73_m2] Final     Comment:     Non  GFR Calc  Starting 12/18/2018, serum creatinine based estimated GFR (eGFR) will be   calculated using the Chronic Kidney Disease Epidemiology Collaboration   (CKD-EPI) equation.         No results found for: \"CPEPT\", \"GADAB\", \"ISCAB\"  AST   Date Value Ref Range Status   12/12/2024 28 0 - 45 U/L Final   03/08/2021 41 (H) 5 - 34 U/L Final     Lab Results   Component Value Date    ALT 24 12/12/2024    ALT 48 03/08/2021             Assessment/Plan:    Type 1 Diabetes with hyperglycemia, neuropathy, retinopathy, and microalbuminuria.  Blood glucose control: Not at goal of A1C <7. Recent CGM data shows significant elevations with oral intake in the setting of late boluses after meals with Fiasp.    Patient has been taking Fiasp differently than recommended at last visit with Dr. Lowe, reviewed recommendations again today:   Lantus 12 units every morning.  Carb ratio 1 unit per 13 g Carbohydrate.  Sliding " scale 1 unit per 40>125 mg/dL (written out for patient)    We reviewed that when switching to Humalog (due to insurance coverage change) it will be even more important to bolus before meals to decrease postprandial hyperglycemia. Recommend bolusing 10-15 minutes prior to meals with Humalog.   We discussed insulin pump therapy today, she is not interested at this time.      DM Complications-   Retinopathy:  No. Sent ophthalmology referral.   Nephropathy:  + microalbuminuria 12/2025.  Creatinine stable.   Neuropathy: Yes, up to date with monofilament, no symptoms currently.  Feet: OK, no ulcers or lesions per patient.  Lipids: Patient is taking a statin.  LDL at goal.  Depression: Endorses mild depression, declines referral to Health Psychology today.    2. HLD  On statin, LDL at goal. Managed by PCP.    3. HTN  BP at goal, managed by PCP.    4. RA  Managed by Rheumatology.      Follow up as scheduled with Dr. Lowe 11/6/2025.      40 minutes spent on the date of the encounter doing chart review, history and exam, documentation, education and counseling, as well as communication and coordination of care, and further activities as noted above.  This time excludes time spent reviewing CGM.      It is my privilege to be involved in the care of the above patient.     LARON Wang, CNP                                          Again, thank you for allowing me to participate in the care of your patient.        Sincerely,        KEVIN Wang CNP    Electronically signed

## 2025-07-07 NOTE — PATIENT INSTRUCTIONS
Nice to meet you today!    We did not make changes to your insulin regimen.    Please continue Lantus 12 units every morning.    Please use carb ratio 1 unit per 13 g Carbohydrate.    Correction Scale:  If blood glucose is: Add extra Humalog/Novolog   125  - 164 1 unit   165  - 204 2 units   205 - 244 3 units   245 - 284 4 units   285 - 324 5 units   325 - 364 6 units   365 - 405 7 units    406 and greater 8 units       Continue to work on bolusing 10-15 minutes prior to meals.

## 2025-07-07 NOTE — PROGRESS NOTES
Diabetes Consult Note  July 6, 2025        Perlita Chavez  is a 54 year old female who has a past medical history of Diabetic retinopathy associated with type 1 diabetes mellitus (H), Dry eyes, Hyperlipidemia LDL goal < 130, RA (rheumatoid arthritis) (H), Sjoegren syndrome, Type 1 diabetes mellitus (H), and Visual impairment.             HPI:  Patient of Dr. Lowe, last seen April 2025. This is my first time meeting her for management of Type 1 Diabetes.  Diabetes was diagnosed in 1972 (age 2).   Diabetes has been complicated by peripheral neuropathy,   Initially treated with NPH and regular insulin, then MDI.   She tried Omnipod in 2014  Used Novolog Echo 1/2U pen, with I:C carb counting method  Tried Lantus BID dosing, then Tresiba (expensive), then Basaglaru              Previously used Novolog insulin  5/2018. Switched to Fiasp 3/2018, then Fiasp non-formulary 2019    Takes prednisone 2 mg daily for RA.    At last visit, Dr. Lowe recommended decreasing Lantus and softening Carb ratio, but bolusing at the start of meals (pt had been bolusing frequently after meals).     In the interim, insurance no longer covered Fiasp, switched to Humalog.  She follow up with Dr. Blanco [facility] 6/26  Dr. Melendez 6/6/2025    Today she reports she is still working on bolusing before meals, this has been challenging and she usually takes post-meal. She is using up Fiasp before switching to Humalog. She has a hard time bolusing before meals. She is having a hard year due to 4 family members passing. Noticed increased blood glucose since she has stopped exercising. She works in retail.      Current Diabetes Medications:  Lantus 12 units once daily in the morning    Fiasp 1:15 carb ratio, Sliding scale 1 per 50>150 (Carb ratio 1:13 and Sliding scale 1u per 40>125 recommended at last visit with Dr. Lowe)    We reviewed glucometer/CGMS data together.  It revealed:  TIR: 37%  High 26%  Very high 37%  Low  0%  Average blood glucose 224 mg/dL  Overall pattern: Variable blood glucose running above goal on average throughout the day. Significant spikes with meals. Brief, infrequent lows.              History of Diabetes monitoring and complications/ prevention:  CAD: No  Last eye exam results: June 27 2025 Retina Specialist Dr. Al Ahmadi. Hx proliferative diabetic retinopathy.  Last dental exam: Not assessed  Neuropathy: +history noted in chart. Denies symptoms today. (+arthritis pain in feet)  Nephropathy: +microalbuminuria 12/2024  HTN: At goal today on recheck  On statin: Yes, simvastatin 40 mg, LDL 68 on 6/30/2025  Depression: Dealing with grief with 4 family members passing. Thinks she has had some mild depression. Denies thoughts of harming self or others.  Feet: No ulcers or lesions per patient.    Perlita's PMH, PSH and allergies were reviewed today and pertinent information is summarized above.    Perlita's pertinent social and family history are also reviewed today and pertinent information is summarized above.    Current Outpatient Medications   Medication Sig Dispense Refill    blood glucose (EDWIN CONTOUR) test strip 1 strip by In Vitro route 4 times daily (before meals and nightly) Checks 4-5 x daily 400 strip 3    blood glucose (NO BRAND SPECIFIED) test strip Use to test blood sugar 4 times daily or as directed, Accu-check Guide test strips, E10.9. 400 strip 0    blood glucose monitoring (NO BRAND SPECIFIED) meter device kit Use to test blood sugar 5 times daily or as directed, Accu-check Guide meter. 1 kit 1    blood glucose monitoring (NO BRAND SPECIFIED) test strip Use to test blood sugar 4 times daily or as directed, Accu-check Guide meter test strips (Patient not taking: Reported on 2/23/2022) 400 strip 3    brimonidine (ALPHAGAN) 0.2 % ophthalmic solution INT 1 GTT IN OU BID (Patient not taking: Reported on 6/30/2025)  3    Continuous Blood Gluc  (FREESTYLE LOUIS 3 READER) SHAR 1 Device  continuous prn (use for continuous glucose testing) 1 each 0    Continuous Glucose Sensor (FREESTYLE LOUIS 3 PLUS SENSOR) MISC CHANGE EVERY 15 DAYS. 6 each 1    cycloSPORINE (RESTASIS) 0.05 % ophthalmic emulsion Restasis 0.05 % eye drops in a dropperette   INT 1 GTT IN OU BID      diclofenac (VOLTAREN) 75 MG EC tablet TK 1 T PO BID  2    dorzolamide-timolol (COSOPT) 2-0.5 % ophthalmic solution INSTILL 1 GTT IN OU QAM  6    Ferrous Sulfate (IRON PO) Blood builder      FOLIC ACID PO Take 1 mg by mouth 2 times daily 500      InFLIXimab (REMICADE IV) Inject 500 mg into the vein Every 7 weeks      insulin aspart (FIASP FLEXTOUCH) 100 UNIT/ML pen-injector ADMINISTER 3 TO 8 UNITS UNDER THE SKIN THREE TIMES DAILY WITH MEALS. 15 mL 5    insulin aspart (NOVOLOG PEN) 100 UNIT/ML pen Inject 3-8 Units subcutaneously 3 times daily (with meals). , total daily dose approx 30U 15 mL 5    insulin glargine (LANTUS SOLOSTAR) 100 UNIT/ML pen Inject 11-13 Units subcutaneously every morning. 15 mL 5    insulin pen needle (BD ELIUD U/F) 32G X 4 MM miscellaneous Use 4 pen needles daily or as directed. 400 each 3    latanoprost (XALATAN) 0.005 % ophthalmic solution       losartan (COZAAR) 25 MG tablet Take 1 tablet (25 mg) by mouth daily. 90 tablet 1    Methotrexate Sodium (METHOTREXATE PO) Take by mouth once a week 5 tablets once a week      Omega-3 Fatty Acids (FISH OIL TRIPLE STRENGTH) 1400 MG CAPS Take 2 tablets by mouth daily. Omega 369 mg      ONE DAILY MULTIPLE VITAMIN OR TABS one daily  0    predniSONE (DELTASONE) 5 MG tablet 2 mg. 2 mg daily  0    simvastatin (ZOCOR) 40 MG tablet TAKE 1 TABLET BY MOUTH AT BEDTIME 90 tablet 1    sulfaSALAzine (AZULFIDINE) 500 MG tablet Take 500 mg by mouth 2 times daily 2 tablets twice daily      valACYclovir (VALTREX) 500 MG tablet Take 500 mg by mouth daily  14 tablet prn     No current facility-administered medications for this visit.         ROS:   Reports good physical activity tolerance.  Denies  "any pedal lesions or vision changes or concerns. Denies any other acute concerns except as noted above.      Exam:    /73   Pulse 80   Wt 51.7 kg (114 lb)   LMP 06/06/2025   BMI 20.19 kg/m    Wt Readings from Last 10 Encounters:   07/07/25 51.7 kg (114 lb)   06/30/25 51.8 kg (114 lb 3.2 oz)   04/29/25 51.7 kg (114 lb)   12/12/24 50.8 kg (111 lb 14.4 oz)   10/04/24 51.7 kg (114 lb)   06/18/24 50.8 kg (112 lb)   03/19/24 52.2 kg (115 lb)   12/19/23 51.9 kg (114 lb 6.4 oz)   11/22/23 52.2 kg (115 lb 1.6 oz)   08/14/23 53.4 kg (117 lb 11.2 oz)     Estimated body mass index is 20.23 kg/m  as calculated from the following:    Height as of 6/30/25: 1.6 m (5' 3\").    Weight as of 6/30/25: 51.8 kg (114 lb 3.2 oz).    Physical Exam:  General: Pleasant, well nourished and hydrated female in NAD.   Psych:  Mood is \"good,\" affect is appropriate.  Thought form and content are fluid and coherent.    HEENT: Eyes and sclera are clear. Extraocular movements are grossly intact without proptosis.    Neck: No masses or JVD are noted.    Resp: Easy and unlabored breathing.   Neuro: Alert and oriented, communicating clearly.   Ext: no swelling or edema.  Data:      Recent Labs   Lab Test 06/30/25  1418 12/12/24  1118 12/12/24  1058 06/18/24  0927 11/22/23  1002 11/22/23  0944   A1C 8.2*  --  8.0* 7.9*  --  7.3*   TSH  --   --  2.78 3.95  --  3.51   LDL 68  --  68  --   --  59   HDL 60  --  52  --   --  53   TRIG 56  --  77  --   --  71   CR 0.95  --  0.92 1.04*  --  0.96*   MICROL  --  45.1  --   --  28.1  --    AST  --   --  28  --   --  30   ALT  --   --  24 24  --  26     No results found for: \"CPEPT\", \"GADAB\", \"ISCAB\"  Cholesterol   Date Value Ref Range Status   06/30/2025 139 <200 mg/dL Final   12/12/2024 135 <200 mg/dL Final   12/04/2020 159 <200 mg/dL Final   11/08/2019 190 <200 mg/dL Final       Hemoglobin   Date Value Ref Range Status   12/12/2024 11.4 (L) 11.7 - 15.7 g/dL Final   11/08/2019 11.4 (L) 11.7 - 15.7 g/dL " "Final   ]      Most recent GFRs:  GFR Estimate   Date Value Ref Range Status   06/30/2025 71 >60 mL/min/1.73m2 Final     Comment:     eGFR calculated using 2021 CKD-EPI equation.   12/12/2024 74 >60 mL/min/1.73m2 Final     Comment:     eGFR calculated using 2021 CKD-EPI equation.   06/18/2024 64 >60 mL/min/1.73m2 Final     Comment:     eGFR calculated using 2021 CKD-EPI equation.   02/19/2021 75 >60 mL/min/[1.73_m2] Final     Comment:     Non  GFR Calc  Starting 12/18/2018, serum creatinine based estimated GFR (eGFR) will be   calculated using the Chronic Kidney Disease Epidemiology Collaboration   (CKD-EPI) equation.     12/15/2020 77.3 ml/min/1.73m2 Final   09/16/2020 82 >60 mL/min/[1.73_m2] Final     Comment:     Non  GFR Calc  Starting 12/18/2018, serum creatinine based estimated GFR (eGFR) will be   calculated using the Chronic Kidney Disease Epidemiology Collaboration   (CKD-EPI) equation.         No results found for: \"CPEPT\", \"GADAB\", \"ISCAB\"  AST   Date Value Ref Range Status   12/12/2024 28 0 - 45 U/L Final   03/08/2021 41 (H) 5 - 34 U/L Final     Lab Results   Component Value Date    ALT 24 12/12/2024    ALT 48 03/08/2021             Assessment/Plan:    Type 1 Diabetes with hyperglycemia, neuropathy, retinopathy, and microalbuminuria.  Blood glucose control: Not at goal of A1C <7. Recent CGM data shows significant elevations with oral intake in the setting of late boluses after meals with Fiasp.    Patient has been taking Fiasp differently than recommended at last visit with Dr. Lowe, reviewed recommendations again today:   Lantus 12 units every morning.  Carb ratio 1 unit per 13 g Carbohydrate.  Sliding scale 1 unit per 40>125 mg/dL (written out for patient)    We reviewed that when switching to Humalog (due to insurance coverage change) it will be even more important to bolus before meals to decrease postprandial hyperglycemia. Recommend bolusing 10-15 minutes prior to " meals with Humalog.   We discussed insulin pump therapy today, she is not interested at this time.      DM Complications-   Retinopathy:  No. Sent ophthalmology referral.   Nephropathy:  + microalbuminuria 12/2025.  Creatinine stable.   Neuropathy: Yes, up to date with monofilament, no symptoms currently.  Feet: OK, no ulcers or lesions per patient.  Lipids: Patient is taking a statin.  LDL at goal.  Depression: Endorses mild depression, declines referral to Health Psychology today.    2. HLD  On statin, LDL at goal. Managed by PCP.    3. HTN  BP at goal, managed by PCP.    4. RA  Managed by Rheumatology.      Follow up as scheduled with Dr. Lowe 11/6/2025.      40 minutes spent on the date of the encounter doing chart review, history and exam, documentation, education and counseling, as well as communication and coordination of care, and further activities as noted above.  This time excludes time spent reviewing CGM.      It is my privilege to be involved in the care of the above patient.     LARON Wang, CNP